# Patient Record
Sex: FEMALE | Race: WHITE | Employment: OTHER | URBAN - METROPOLITAN AREA
[De-identification: names, ages, dates, MRNs, and addresses within clinical notes are randomized per-mention and may not be internally consistent; named-entity substitution may affect disease eponyms.]

---

## 2017-03-24 PROBLEM — R53.82 CHRONIC FATIGUE: Status: ACTIVE | Noted: 2017-03-24

## 2017-03-24 PROBLEM — L29.0 RECTAL ITCHING: Status: ACTIVE | Noted: 2017-03-24

## 2017-05-30 PROBLEM — R53.82 CHRONIC FATIGUE: Status: RESOLVED | Noted: 2017-03-24 | Resolved: 2017-05-30

## 2017-09-22 PROBLEM — K21.9 GASTROESOPHAGEAL REFLUX DISEASE WITHOUT ESOPHAGITIS: Status: ACTIVE | Noted: 2017-09-22

## 2018-04-09 ENCOUNTER — HOSPITAL ENCOUNTER (OUTPATIENT)
Dept: MAMMOGRAPHY | Age: 78
Discharge: HOME | End: 2018-04-09
Attending: OBSTETRICS & GYNECOLOGY
Payer: MEDICARE

## 2018-04-09 DIAGNOSIS — Z12.39 SCREENING BREAST EXAMINATION: ICD-10-CM

## 2018-04-09 PROCEDURE — 77067 SCR MAMMO BI INCL CAD: CPT

## 2018-06-25 PROBLEM — Z28.21 REFUSED PNEUMOCOCCAL VACCINE: Status: ACTIVE | Noted: 2018-06-25

## 2018-06-25 PROBLEM — F51.02 INSOMNIA DUE TO STRESS: Status: ACTIVE | Noted: 2018-06-25

## 2018-06-25 PROBLEM — Z28.21 REFUSED INFLUENZA VACCINE: Status: ACTIVE | Noted: 2018-06-25

## 2018-06-25 PROBLEM — Z28.21 REFUSED VARICELLA VACCINE: Status: ACTIVE | Noted: 2018-06-25

## 2019-04-02 PROBLEM — Z63.79 STRESS DUE TO SPOUSE WITH DEMENTIA: Status: ACTIVE | Noted: 2019-04-02

## 2019-06-12 ENCOUNTER — HOSPITAL ENCOUNTER (OUTPATIENT)
Dept: MAMMOGRAPHY | Age: 79
Discharge: HOME | End: 2019-06-12
Attending: OBSTETRICS & GYNECOLOGY
Payer: MEDICARE

## 2019-06-12 DIAGNOSIS — Z12.39 ENCOUNTER FOR SCREENING FOR MALIGNANT NEOPLASM OF BREAST: ICD-10-CM

## 2019-06-12 PROCEDURE — 77063 BREAST TOMOSYNTHESIS BI: CPT

## 2019-07-24 PROBLEM — L29.0 RECTAL ITCHING: Status: RESOLVED | Noted: 2017-03-24 | Resolved: 2019-07-24

## 2019-12-31 ENCOUNTER — HOSPITAL ENCOUNTER (OUTPATIENT)
Dept: GENERAL RADIOLOGY | Age: 79
Discharge: HOME | End: 2019-12-31
Attending: PHYSICIAN ASSISTANT
Payer: MEDICARE

## 2019-12-31 DIAGNOSIS — M25.562 ACUTE PAIN OF LEFT KNEE: ICD-10-CM

## 2019-12-31 DIAGNOSIS — M54.50 LUMBAR BACK PAIN: ICD-10-CM

## 2019-12-31 DIAGNOSIS — M25.552 LEFT HIP PAIN: ICD-10-CM

## 2019-12-31 DIAGNOSIS — M51.36 LUMBAR DEGENERATIVE DISC DISEASE: ICD-10-CM

## 2019-12-31 PROCEDURE — 73562 X-RAY EXAM OF KNEE 3: CPT

## 2019-12-31 PROCEDURE — 73502 X-RAY EXAM HIP UNI 2-3 VIEWS: CPT

## 2019-12-31 PROCEDURE — 72100 X-RAY EXAM L-S SPINE 2/3 VWS: CPT

## 2020-07-22 ENCOUNTER — HOSPITAL ENCOUNTER (OUTPATIENT)
Dept: MAMMOGRAPHY | Age: 80
Discharge: HOME | End: 2020-07-22
Attending: OBSTETRICS & GYNECOLOGY
Payer: MEDICARE

## 2020-07-22 DIAGNOSIS — Z12.31 VISIT FOR SCREENING MAMMOGRAM: ICD-10-CM

## 2020-07-22 PROCEDURE — 77063 BREAST TOMOSYNTHESIS BI: CPT

## 2020-10-26 ENCOUNTER — HOSPITAL ENCOUNTER (INPATIENT)
Age: 80
LOS: 4 days | Discharge: HOME | DRG: 194 | End: 2020-10-30
Attending: STUDENT IN AN ORGANIZED HEALTH CARE EDUCATION/TRAINING PROGRAM | Admitting: INTERNAL MEDICINE
Payer: MEDICARE

## 2020-10-26 ENCOUNTER — APPOINTMENT (OUTPATIENT)
Dept: GENERAL RADIOLOGY | Age: 80
DRG: 194 | End: 2020-10-26
Payer: MEDICARE

## 2020-10-26 ENCOUNTER — APPOINTMENT (OUTPATIENT)
Dept: CT IMAGING | Age: 80
DRG: 194 | End: 2020-10-26
Attending: STUDENT IN AN ORGANIZED HEALTH CARE EDUCATION/TRAINING PROGRAM
Payer: MEDICARE

## 2020-10-26 DIAGNOSIS — R09.02 HYPOXEMIA: ICD-10-CM

## 2020-10-26 DIAGNOSIS — R06.02 SOB (SHORTNESS OF BREATH): Primary | ICD-10-CM

## 2020-10-26 DIAGNOSIS — J90 PLEURAL EFFUSION: ICD-10-CM

## 2020-10-26 LAB
ALBUMIN SERPL-MCNC: 3.5 G/DL (ref 3.2–4.6)
ALBUMIN/GLOB SERPL: 1.1 {RATIO} (ref 1.2–3.5)
ALP SERPL-CCNC: 82 U/L (ref 50–130)
ALT SERPL-CCNC: 21 U/L (ref 12–65)
ANION GAP SERPL CALC-SCNC: 7 MMOL/L (ref 7–16)
AST SERPL-CCNC: 19 U/L (ref 15–37)
ATRIAL RATE: 116 BPM
BASOPHILS # BLD: 0 K/UL (ref 0–0.2)
BASOPHILS NFR BLD: 0 % (ref 0–2)
BILIRUB SERPL-MCNC: 0.6 MG/DL (ref 0.2–1.1)
BNP SERPL-MCNC: 293 PG/ML
BUN SERPL-MCNC: 8 MG/DL (ref 8–23)
CALCIUM SERPL-MCNC: 9.2 MG/DL (ref 8.3–10.4)
CALCULATED P AXIS, ECG09: 63 DEGREES
CALCULATED R AXIS, ECG10: 31 DEGREES
CALCULATED T AXIS, ECG11: 106 DEGREES
CHLORIDE SERPL-SCNC: 105 MMOL/L (ref 98–107)
CO2 SERPL-SCNC: 27 MMOL/L (ref 21–32)
CREAT SERPL-MCNC: 0.83 MG/DL (ref 0.6–1)
DIAGNOSIS, 93000: NORMAL
DIFFERENTIAL METHOD BLD: ABNORMAL
EOSINOPHIL # BLD: 0.2 K/UL (ref 0–0.8)
EOSINOPHIL NFR BLD: 2 % (ref 0.5–7.8)
ERYTHROCYTE [DISTWIDTH] IN BLOOD BY AUTOMATED COUNT: 13.5 % (ref 11.9–14.6)
GLOBULIN SER CALC-MCNC: 3.1 G/DL (ref 2.3–3.5)
GLUCOSE SERPL-MCNC: 113 MG/DL (ref 65–100)
HCT VFR BLD AUTO: 38.4 % (ref 35.8–46.3)
HGB BLD-MCNC: 12.8 G/DL (ref 11.7–15.4)
IMM GRANULOCYTES # BLD AUTO: 0.1 K/UL (ref 0–0.5)
IMM GRANULOCYTES NFR BLD AUTO: 1 % (ref 0–5)
LACTATE SERPL-SCNC: 1 MMOL/L (ref 0.4–2)
LYMPHOCYTES # BLD: 1.2 K/UL (ref 0.5–4.6)
LYMPHOCYTES NFR BLD: 11 % (ref 13–44)
MCH RBC QN AUTO: 32.3 PG (ref 26.1–32.9)
MCHC RBC AUTO-ENTMCNC: 33.3 G/DL (ref 31.4–35)
MCV RBC AUTO: 97 FL (ref 79.6–97.8)
MONOCYTES # BLD: 0.8 K/UL (ref 0.1–1.3)
MONOCYTES NFR BLD: 8 % (ref 4–12)
NEUTS SEG # BLD: 8.1 K/UL (ref 1.7–8.2)
NEUTS SEG NFR BLD: 78 % (ref 43–78)
NRBC # BLD: 0 K/UL (ref 0–0.2)
P-R INTERVAL, ECG05: 96 MS
PLATELET # BLD AUTO: 217 K/UL (ref 150–450)
PMV BLD AUTO: 10.1 FL (ref 9.4–12.3)
POTASSIUM SERPL-SCNC: 3.5 MMOL/L (ref 3.5–5.1)
PROT SERPL-MCNC: 6.6 G/DL (ref 6.3–8.2)
Q-T INTERVAL, ECG07: 334 MS
QRS DURATION, ECG06: 90 MS
QTC CALCULATION (BEZET), ECG08: 464 MS
RBC # BLD AUTO: 3.96 M/UL (ref 4.05–5.2)
SODIUM SERPL-SCNC: 139 MMOL/L (ref 136–145)
TROPONIN-HIGH SENSITIVITY: 9.9 PG/ML (ref 0–14)
VENTRICULAR RATE, ECG03: 116 BPM
WBC # BLD AUTO: 10.3 K/UL (ref 4.3–11.1)

## 2020-10-26 PROCEDURE — 87635 SARS-COV-2 COVID-19 AMP PRB: CPT

## 2020-10-26 PROCEDURE — 83605 ASSAY OF LACTIC ACID: CPT

## 2020-10-26 PROCEDURE — 74011250636 HC RX REV CODE- 250/636: Performed by: STUDENT IN AN ORGANIZED HEALTH CARE EDUCATION/TRAINING PROGRAM

## 2020-10-26 PROCEDURE — 84484 ASSAY OF TROPONIN QUANT: CPT

## 2020-10-26 PROCEDURE — 74011000258 HC RX REV CODE- 258: Performed by: STUDENT IN AN ORGANIZED HEALTH CARE EDUCATION/TRAINING PROGRAM

## 2020-10-26 PROCEDURE — 85025 COMPLETE CBC W/AUTO DIFF WBC: CPT

## 2020-10-26 PROCEDURE — 74011000250 HC RX REV CODE- 250: Performed by: INTERNAL MEDICINE

## 2020-10-26 PROCEDURE — 74011250637 HC RX REV CODE- 250/637: Performed by: INTERNAL MEDICINE

## 2020-10-26 PROCEDURE — 65270000029 HC RM PRIVATE

## 2020-10-26 PROCEDURE — 2709999900 HC NON-CHARGEABLE SUPPLY

## 2020-10-26 PROCEDURE — 99283 EMERGENCY DEPT VISIT LOW MDM: CPT

## 2020-10-26 PROCEDURE — 71046 X-RAY EXAM CHEST 2 VIEWS: CPT

## 2020-10-26 PROCEDURE — 74011250636 HC RX REV CODE- 250/636: Performed by: INTERNAL MEDICINE

## 2020-10-26 PROCEDURE — 83880 ASSAY OF NATRIURETIC PEPTIDE: CPT

## 2020-10-26 PROCEDURE — 71260 CT THORAX DX C+: CPT

## 2020-10-26 PROCEDURE — 93005 ELECTROCARDIOGRAM TRACING: CPT | Performed by: STUDENT IN AN ORGANIZED HEALTH CARE EDUCATION/TRAINING PROGRAM

## 2020-10-26 PROCEDURE — 74011000636 HC RX REV CODE- 636: Performed by: STUDENT IN AN ORGANIZED HEALTH CARE EDUCATION/TRAINING PROGRAM

## 2020-10-26 PROCEDURE — 80053 COMPREHEN METABOLIC PANEL: CPT

## 2020-10-26 RX ORDER — ENOXAPARIN SODIUM 100 MG/ML
40 INJECTION SUBCUTANEOUS DAILY
Status: DISCONTINUED | OUTPATIENT
Start: 2020-10-27 | End: 2020-10-26

## 2020-10-26 RX ORDER — PANTOPRAZOLE SODIUM 40 MG/1
40 TABLET, DELAYED RELEASE ORAL
Status: DISCONTINUED | OUTPATIENT
Start: 2020-10-27 | End: 2020-10-30 | Stop reason: HOSPADM

## 2020-10-26 RX ORDER — HYDROCORTISONE ACETATE PRAMOXINE HCL 2.5; 1 G/100G; G/100G
CREAM TOPICAL
Status: DISCONTINUED | OUTPATIENT
Start: 2020-10-26 | End: 2020-10-30 | Stop reason: HOSPADM

## 2020-10-26 RX ORDER — AZITHROMYCIN 250 MG/1
500 TABLET, FILM COATED ORAL DAILY
Status: DISCONTINUED | OUTPATIENT
Start: 2020-10-27 | End: 2020-10-26

## 2020-10-26 RX ORDER — SODIUM CHLORIDE 9 MG/ML
50 INJECTION, SOLUTION INTRAVENOUS CONTINUOUS
Status: DISPENSED | OUTPATIENT
Start: 2020-10-26 | End: 2020-10-27

## 2020-10-26 RX ORDER — PROMETHAZINE HYDROCHLORIDE 25 MG/1
12.5 TABLET ORAL
Status: DISCONTINUED | OUTPATIENT
Start: 2020-10-26 | End: 2020-10-30 | Stop reason: HOSPADM

## 2020-10-26 RX ORDER — HYOSCYAMINE SULFATE 0.12 MG/1
0.12 TABLET SUBLINGUAL
Status: DISCONTINUED | OUTPATIENT
Start: 2020-10-26 | End: 2020-10-30 | Stop reason: HOSPADM

## 2020-10-26 RX ORDER — ONDANSETRON 2 MG/ML
4 INJECTION INTRAMUSCULAR; INTRAVENOUS
Status: DISCONTINUED | OUTPATIENT
Start: 2020-10-26 | End: 2020-10-26

## 2020-10-26 RX ORDER — SODIUM CHLORIDE 0.9 % (FLUSH) 0.9 %
5-40 SYRINGE (ML) INJECTION AS NEEDED
Status: DISCONTINUED | OUTPATIENT
Start: 2020-10-26 | End: 2020-10-30 | Stop reason: HOSPADM

## 2020-10-26 RX ORDER — IPRATROPIUM BROMIDE AND ALBUTEROL SULFATE 2.5; .5 MG/3ML; MG/3ML
3 SOLUTION RESPIRATORY (INHALATION)
Status: DISCONTINUED | OUTPATIENT
Start: 2020-10-26 | End: 2020-10-30 | Stop reason: HOSPADM

## 2020-10-26 RX ORDER — ACETAMINOPHEN 325 MG/1
650 TABLET ORAL
Status: DISCONTINUED | OUTPATIENT
Start: 2020-10-26 | End: 2020-10-30 | Stop reason: HOSPADM

## 2020-10-26 RX ORDER — LATANOPROST 50 UG/ML
1 SOLUTION/ DROPS OPHTHALMIC
Status: DISCONTINUED | OUTPATIENT
Start: 2020-10-26 | End: 2020-10-30 | Stop reason: HOSPADM

## 2020-10-26 RX ORDER — BISMUTH SUBSALICYLATE 262 MG/15ML
30 LIQUID ORAL
Status: DISCONTINUED | OUTPATIENT
Start: 2020-10-26 | End: 2020-10-30 | Stop reason: HOSPADM

## 2020-10-26 RX ORDER — IPRATROPIUM BROMIDE 0.5 MG/2.5ML
0.5 SOLUTION RESPIRATORY (INHALATION) ONCE
Status: DISCONTINUED | OUTPATIENT
Start: 2020-10-26 | End: 2020-10-26

## 2020-10-26 RX ORDER — SODIUM CHLORIDE 0.9 % (FLUSH) 0.9 %
5-40 SYRINGE (ML) INJECTION EVERY 8 HOURS
Status: DISCONTINUED | OUTPATIENT
Start: 2020-10-26 | End: 2020-10-30 | Stop reason: HOSPADM

## 2020-10-26 RX ORDER — ACETAMINOPHEN 650 MG/1
650 SUPPOSITORY RECTAL
Status: DISCONTINUED | OUTPATIENT
Start: 2020-10-26 | End: 2020-10-30 | Stop reason: HOSPADM

## 2020-10-26 RX ORDER — POLYETHYLENE GLYCOL 3350 17 G/17G
17 POWDER, FOR SOLUTION ORAL DAILY PRN
Status: DISCONTINUED | OUTPATIENT
Start: 2020-10-26 | End: 2020-10-30 | Stop reason: HOSPADM

## 2020-10-26 RX ORDER — SODIUM CHLORIDE 0.9 % (FLUSH) 0.9 %
10 SYRINGE (ML) INJECTION
Status: COMPLETED | OUTPATIENT
Start: 2020-10-26 | End: 2020-10-26

## 2020-10-26 RX ORDER — LORATADINE 10 MG/1
10 TABLET ORAL DAILY
Status: DISCONTINUED | OUTPATIENT
Start: 2020-10-27 | End: 2020-10-30 | Stop reason: HOSPADM

## 2020-10-26 RX ORDER — GUAIFENESIN 600 MG/1
600 TABLET, EXTENDED RELEASE ORAL EVERY 12 HOURS
Status: DISCONTINUED | OUTPATIENT
Start: 2020-10-26 | End: 2020-10-30 | Stop reason: HOSPADM

## 2020-10-26 RX ORDER — ALBUTEROL SULFATE 2.5 MG/.5ML
1.25 SOLUTION RESPIRATORY (INHALATION)
Status: DISCONTINUED | OUTPATIENT
Start: 2020-10-26 | End: 2020-10-26

## 2020-10-26 RX ORDER — LEVOFLOXACIN 5 MG/ML
500 INJECTION, SOLUTION INTRAVENOUS EVERY 24 HOURS
Status: DISCONTINUED | OUTPATIENT
Start: 2020-10-26 | End: 2020-10-28 | Stop reason: ALTCHOICE

## 2020-10-26 RX ORDER — TIMOLOL MALEATE 5 MG/ML
1 SOLUTION/ DROPS OPHTHALMIC DAILY
Status: DISCONTINUED | OUTPATIENT
Start: 2020-10-27 | End: 2020-10-30 | Stop reason: HOSPADM

## 2020-10-26 RX ADMIN — HYDROCORTISONE ACETATE PRAMOXINE HCL: 2.5; 1 CREAM TOPICAL at 23:45

## 2020-10-26 RX ADMIN — SODIUM CHLORIDE 50 ML/HR: 900 INJECTION, SOLUTION INTRAVENOUS at 18:00

## 2020-10-26 RX ADMIN — HYOSCYAMINE SULFATE 0.12 MG: 0.12 TABLET ORAL; SUBLINGUAL at 23:45

## 2020-10-26 RX ADMIN — Medication 10 ML: at 13:32

## 2020-10-26 RX ADMIN — LATANOPROST 1 DROP: 50 SOLUTION OPHTHALMIC at 22:20

## 2020-10-26 RX ADMIN — GUAIFENESIN 600 MG: 600 TABLET, EXTENDED RELEASE ORAL at 22:20

## 2020-10-26 RX ADMIN — SODIUM CHLORIDE 500 ML: 900 INJECTION, SOLUTION INTRAVENOUS at 13:00

## 2020-10-26 RX ADMIN — IOPAMIDOL 100 ML: 755 INJECTION, SOLUTION INTRAVENOUS at 13:31

## 2020-10-26 RX ADMIN — SODIUM CHLORIDE 100 ML: 900 INJECTION, SOLUTION INTRAVENOUS at 13:32

## 2020-10-26 RX ADMIN — LEVOFLOXACIN 500 MG: 5 INJECTION, SOLUTION INTRAVENOUS at 17:11

## 2020-10-26 RX ADMIN — Medication 10 ML: at 22:20

## 2020-10-26 NOTE — H&P
History and Physical    Patient: Sandy Valdes MRN: [id number]  SSN: [id number]    YOB: 1940  Age: 79 y.o. Sex: female      Subjective:      Sandy Valdes is a 79 y.o. female who came to ER due to coughing, shortness of breath for the past 3 days. She has had some cough and shortness of breath for the past week. She thought it was because she was going through her stuff in garage. She started taking Zithromax yesterday. Her shortness of breath and coughing got worse and so she decided to come to ER. In ER, she was found to have oxygen saturation of 87% in room air. She was improved with oxygen cannula. CT shows pleural effusion on the right chest with possible mass effect. Hospitalist service is requested to admit the patient. Other PMH is listed below.      Past Medical History:   Diagnosis Date    Arrhythmia     while in ER a week ago-irregular/ Regular rythm today    Arthritis     osteo-left hand    Autoimmune disease ([address]     fibromyalgia    Compression fracture of T12 vertebra ([address] 8/1/2014    Endometriosis     hyst    Fibrocystic breast     Fibromyalgia     GERD (gastroesophageal reflux disease)     nexium daily    Glaucoma     Followed by Dr. Ronda May Headache     rare (not migraines)    IBS (irritable bowel syndrome)     bentyl    Ovarian cyst     hx of-bilateral S&O    Rectal itching 3/24/2017    UTI (urinary tract infection)     hx of; last one 2-3 years ago    Vaginitis, atrophic     asymptomatic at present     Past Surgical History:   Procedure Laterality Date    HX BREAST BIOPSY Bilateral 1980    HX HEMORRHOIDECTOMY  1970    HX HYSTERECTOMY Bilateral 1986    full    HX KYPHOPLASTY N/A 08/2014    HX OVARIAN CYST REMOVAL  1984    Both ovaries have been removed    HX [facility]    HX VAGINAL VAULT SUSPENSION  2009      Family History   Problem Relation Age of Onset    Arthritis-osteo Mother     GERD Mother     Cancer Mother         stomach cancer    Elevated Lipids Father     Heart Disease Father 76    Hypertension Father     Heart Attack Father     GERD Father     GERD Brother     GERD Son     GERD Brother     GERD Brother     Thyroid Disease Daughter     Breast Cancer Neg Hx      Social History     Tobacco Use    Smoking status: Former Smoker     Last attempt to quit: 1986     Years since quittin.2    Smokeless tobacco: Never Used   Substance Use Topics    Alcohol use: Yes     Alcohol/week: 5.8 standard drinks     Types: 7 Glasses of wine per week      Prior to Admission medications    Medication Sig Start Date End Date Taking? Authorizing Provider   hyoscyamine SL (LEVSIN/SL) 0.125 mg SL tablet 1 Tab by SubLINGual route every four (4) hours as needed for Cramping. Indications: irritable colon 20   Isa Heck MD   hydrocortisone-pramoxine [facility]) 2.5-1 % rectal cream Insert  into rectum three (3) times daily. 20   Isa Heck MD   bi-est/progest 1.5/100 (BI-EST/PROGEST 1.5/100) compound Apply  to affected area. COMPOUND= Bi- est/progest 1.5/ 100 Cream    Provider, Historical   timolol (TIMOPTIC) 0.5 % ophthalmic solution PLACE 1 DROP IN BOTH EYES IN THE MORNING 1/10/17   Provider, Historical   triamcinolone (NASACORT AQ) 55 mcg nasal inhaler 2 Sprays daily. Provider, Historical   estradiol (ESTRACE) 0.01 % (0.1 mg/gram) vaginal cream Use 2 grams nightly daily 7/1/15   Patricio Gandhi MD   cetirizine (ZYRTEC) 10 mg tablet Take  by mouth daily. Provider, Historical   esomeprazole (NEXIUM) 20 mg capsule Take 40 mg by mouth every morning. Provider, Historical   latanoprost (XALATAN) 0.005 % ophthalmic solution Administer 1 Drop to both eyes nightly.     Provider, Historical        Allergies   Allergen Reactions    Seasonale [Levonorgestrel-Ethinyl Estrad] Runny Nose and Sneezing     Patient denies ever taking medication    Ciprofibrate Diarrhea    Penicillins Rash Review of Systems:    10-point review of systems is negative except what is mentioned in the present illness section. Objective:     Vitals:    10/26/20 1151 10/26/20 1259   BP: (!) 144/74    Pulse: (!) 136 (!) 119   Resp: 24    Temp: 98 °F (36.7 °C)    SpO2: (!) 87% 94%   Weight: 65.8 kg (145 lb)    Height: 5' 4\" (1.626 m)         Physical Exam:    General:                    The patient is a pleasant elderly female in acute respiratory distress. She is coughing. Head:                                   Normocephalic/atraumatic. Eyes:                                   No palpebral pallor or scleral icterus. ENT:                                    External auricular and nasal exam within normal limits. Mucous membranes are dry. Neck:                                   Supple, non-tender, no JVD. Lungs:                       decreased to auscultation bilaterally, more the right. Some crackles                                               No respiratory accessory muscle use. Heart:                                  Regular rate and rhythm, without murmurs, rubs, or gallops. Abdomen:                  Soft, non-tender, non-distended with normoactive bowel sounds. Genitourinary:           No tenderness over the bladder or bilateral CVAs. Extremities:               Without clubbing, cyanosis, or edema. Skin:                                    Normal color, texture, and turgor. No rashes, lesions, or jaundice. Pulses:                      Radial and dorsalis pedis pulses present 2+ bilaterally. Capillary refill <2s. Neurologic:                CN II-XII grossly intact and symmetrical.                                               Moving all four extremities well with no focal deficits. Psychiatric:                Pleasant demeanor, appropriate affect.  Alert and oriented x 3    Lab and data     Recent Results (from the past 24 hour(s))   CBC WITH AUTOMATED DIFF    Collection Time: 10/26/20 12:08 PM   Result Value Ref Range    WBC 10.3 4.3 - 11.1 K/uL    RBC 3.96 (L) 4.05 - 5.2 M/uL    HGB 12.8 11.7 - 15.4 g/dL    HCT 38.4 35.8 - 46.3 %    MCV 97.0 79.6 - 97.8 FL    MCH 32.3 26.1 - 32.9 PG    MCHC 33.3 31.4 - 35.0 g/dL    RDW 13.5 11.9 - 14.6 %    PLATELET 650 633 - 864 K/uL    MPV 10.1 9.4 - 12.3 FL    ABSOLUTE NRBC 0.00 0.0 - 0.2 K/uL    DF AUTOMATED      NEUTROPHILS 78 43 - 78 %    LYMPHOCYTES 11 (L) 13 - 44 %    MONOCYTES 8 4.0 - 12.0 %    EOSINOPHILS 2 0.5 - 7.8 %    BASOPHILS 0 0.0 - 2.0 %    IMMATURE GRANULOCYTES 1 0.0 - 5.0 %    ABS. NEUTROPHILS 8.1 1.7 - 8.2 K/UL    ABS. LYMPHOCYTES 1.2 0.5 - 4.6 K/UL    ABS. MONOCYTES 0.8 0.1 - 1.3 K/UL    ABS. EOSINOPHILS 0.2 0.0 - 0.8 K/UL    ABS. BASOPHILS 0.0 0.0 - 0.2 K/UL    ABS. IMM. GRANS. 0.1 0.0 - 0.5 K/UL   METABOLIC PANEL, COMPREHENSIVE    Collection Time: 10/26/20 12:08 PM   Result Value Ref Range    Sodium 139 136 - 145 mmol/L    Potassium 3.5 3.5 - 5.1 mmol/L    Chloride 105 98 - 107 mmol/L    CO2 27 21 - 32 mmol/L    Anion gap 7 7 - 16 mmol/L    Glucose 113 (H) 65 - 100 mg/dL    BUN 8 8 - 23 MG/DL    Creatinine 0.83 0.6 - 1.0 MG/DL    GFR est AA >60 >60 ml/min/1.73m2    GFR est non-AA >60 >60 ml/min/1.73m2    Calcium 9.2 8.3 - 10.4 MG/DL    Bilirubin, total 0.6 0.2 - 1.1 MG/DL    ALT (SGPT) 21 12 - 65 U/L    AST (SGOT) 19 15 - 37 U/L    Alk.  phosphatase 82 50 - 130 U/L    Protein, total 6.6 6.3 - 8.2 g/dL    Albumin 3.5 3.2 - 4.6 g/dL    Globulin 3.1 2.3 - 3.5 g/dL    A-G Ratio 1.1 (L) 1.2 - 3.5     TROPONIN-HIGH SENSITIVITY    Collection Time: 10/26/20 12:08 PM   Result Value Ref Range    Troponin-High Sensitivity 9.9 0 - 14 pg/mL   NT-PRO BNP    Collection Time: 10/26/20 12:08 PM   Result Value Ref Range    NT pro- <450 PG/ML   LACTIC ACID    Collection Time: 10/26/20 12:24 PM   Result Value Ref Range    Lactic acid 1.0 0.4 - 2.0 MMOL/L   EKG, 12 LEAD, INITIAL    Collection Time: 10/26/20 12:52 PM   Result Value Ref Range    Ventricular Rate 116 BPM    Atrial Rate 116 BPM    P-R Interval 96 ms    QRS Duration 90 ms    Q-T Interval 334 ms    QTC Calculation (Bezet) 464 ms    Calculated P Axis 63 degrees    Calculated R Axis 31 degrees    Calculated T Axis 106 degrees    Diagnosis       !! AGE AND GENDER SPECIFIC ECG ANALYSIS !! Sinus tachycardia with short MO with occasional Premature ventricular   complexes  Septal infarct (cited on or before 01-AUG-2014)  Nonspecific ST abnormality  When compared with ECG of 01-AUG-2014 19:55,  MO interval has decreased  QRS axis Shifted right  ST now depressed in Inferior leads  Nonspecific T wave abnormality, worse in Lateral leads  Confirmed by Matt Clarke MD (), NIC WARREN ([id number]) on 10/26/2020 2:42:04 PM       CT chest   10/26/2020   IMPRESSION:  1. Large right pleural effusion with collapse of the right lower lobe and  majority of the right middle lobe and mass effect on the mediastinum and  inferior displacement of the diaphragm. . No pericardial fluid. 2. No obstructing right hilar mass. Reimaging can performed after fluid removal  for further evaluation. 3. No pulmonary embolism. XR chest   10-   IMPRESSION:  Moderate right pleural effusion, new from exam on September 18, 2015.     EKG   10-   !! AGE AND GENDER SPECIFIC ECG ANALYSIS !! Sinus tachycardia with short MO with occasional Premature ventricular   complexes   Septal infarct (cited on or before 01-AUG-2014)   Nonspecific ST abnormality   When compared with ECG of 01-AUG-2014 19:55,   MO interval has decreased   QRS axis Shifted right   ST now depressed in Inferior leads   Nonspecific T wave abnormality, worse in Lateral leads   Confirmed by Matt Clarke MD ()NIC ([id number]) on 10/26/2020 2:42:04 PM     I have reviewed chest x-ray and ECG myself.      Assessment:     Hospital Problems  Date Reviewed: 1/21/2020          Codes Class Noted POA * (Principal) Pleural effusion ICD-10-CM: J90  ICD-9-CM: 511.9  10/26/2020 Unknown        Gastroesophageal reflux disease without esophagitis ICD-10-CM: K21.9  ICD-9-CM: 530.81  9/22/2017 Yes        Irritable bowel syndrome with diarrhea ICD-10-CM: K58.0  ICD-9-CM: 564.1  9/16/2016 Yes              Plan:     Pleural effusion   shortness of breath   Hypoxia   ? mass effect   Admit to medical floor   IV fluid   Empiric IV antibiotics to cover CAP  Nebulizer   Oxygen   Consult pulmonary service for thoracentesis and ?bronchoscopy. IBS   Continue home medications. GERD   Continue home medications. Patient requires hospital stay as an in-patient and anticipated stay is more than 2 midnights due to the serious nature of the illness. I have discussed with patient regarding advance directive. Patient would like to have a DNR status. Healthcare power of  is daughter, Naomy James. I have discussed the plan of care with patient. Patient has no pain now. Will monitor. Further treatments will depend on initial responses and findings.      DVT prophylaxis : SCD     Signed By: Zackery Gutiérrez MD     October 26, 2020

## 2020-10-26 NOTE — ED PROVIDER NOTES
Patient is a very pleasant 67-year-old female presents to the emergency department for evaluation of cough, congestion shortness of breath. Patient states her shortness of breath is far worse with exertion. Denies fever or chills. Patient states she was seen at local urgent care x2, last being seen yesterday, she did not receive a chest x-ray, given a Z-Rajendra as well as an inhaler. Patient ports no improvement of symptoms and came to the emergency department. Patient also has right-sided chest pain, worse with lying down, improves with sitting up. She denies palpitations. Denies history of atrial fibrillation, coronary artery disease, history of PE, history of cancer. Patient is on estrogen therapy.            Past Medical History:   Diagnosis Date    Arrhythmia     while in ER a week ago-irregular/ Regular rythm today    Arthritis     osteo-left hand    Autoimmune disease ([address]     fibromyalgia    Compression fracture of T12 vertebra ([address] 8/1/2014    Endometriosis     hyst    Fibrocystic breast     Fibromyalgia     GERD (gastroesophageal reflux disease)     nexium daily    Glaucoma     Followed by Dr. Coby Ruiz Headache     rare (not migraines)    IBS (irritable bowel syndrome)     bentyl    Ovarian cyst     hx of-bilateral S&O    Rectal itching 3/24/2017    UTI (urinary tract infection)     hx of; last one 2-3 years ago    Vaginitis, atrophic     asymptomatic at present       Past Surgical History:   Procedure Laterality Date    HX BREAST BIOPSY Bilateral 1980    HX HEMORRHOIDECTOMY  1970    HX HYSTERECTOMY Bilateral 1986    full    HX KYPHOPLASTY N/A 08/2014    HX OVARIAN CYST REMOVAL  1984    Both ovaries have been removed    [facility]X [facility] [facility]    HX VAGINAL VAULT SUSPENSION  2009         Family History:   Problem Relation Age of Onset   [address] [facility] [address] Arthritis-osteo Mother     GERD Mother     Cancer Mother         stomach cancer    Elevated Lipids Father     Heart Disease Father 76  Hypertension Father     Heart Attack Father     GERD Father     GERD Brother     GERD Son     GERD Brother     GERD Brother     Thyroid Disease Daughter     Breast Cancer Neg Hx        Social History     Socioeconomic History    Marital status:      Spouse name: Not on file    Number of children: Not on file    Years of education: Not on file    Highest education level: Not on file   Occupational History    Not on file   Social Needs    Financial resource strain: Not on file    Food insecurity     Worry: Not on file     Inability: Not on file    Transportation needs     Medical: Not on file     Non-medical: Not on file   Tobacco Use    Smoking status: Former Smoker     Last attempt to quit: 1986     Years since quittin.2    Smokeless tobacco: Never Used   Substance and Sexual Activity    Alcohol use: Yes     Alcohol/week: 5.8 standard drinks     Types: 7 Glasses of wine per week    Drug use: No    Sexual activity: Yes     Partners: Male     Birth control/protection: Surgical   Lifestyle    Physical activity     Days per week: Not on file     Minutes per session: Not on file    Stress: Not on file   Relationships    Social connections     Talks on phone: Not on file     Gets together: Not on file     Attends Druze service: Not on file     Active member of club or organization: Not on file     Attends meetings of clubs or organizations: Not on file     Relationship status: Not on file    Intimate partner violence     Fear of current or ex partner: Not on file     Emotionally abused: Not on file     Physically abused: Not on file     Forced sexual activity: Not on file   Other Topics Concern    Not on file   Social History Narrative    Not on file         ALLERGIES: Seasonale [levonorgestrel-ethinyl estrad]; Ciprofibrate; and Penicillins    Review of Systems   Constitutional: Negative for chills and fever. HENT: Negative for sinus pressure. Eyes: Negative. Respiratory: Positive for cough and shortness of breath. Negative for chest tightness. Cardiovascular: Negative for chest pain. Gastrointestinal: Negative for abdominal pain. Genitourinary: Negative for flank pain. Vitals:    10/26/20 1151   BP: (!) 144/74   Pulse: (!) 136   Resp: 24   Temp: 98 °F (36.7 °C)   SpO2: (!) 87%   Weight: 65.8 kg (145 lb)   Height: 5' 4\" (1.626 m)            Physical Exam  HENT:      Head: Normocephalic and atraumatic. Eyes:      Extraocular Movements: Extraocular movements intact. Neck:      Musculoskeletal: Normal range of motion. Cardiovascular:      Rate and Rhythm: Regular rhythm. Tachycardia present. Heart sounds: No friction rub. Pulmonary:      Effort: No respiratory distress. Breath sounds: Examination of the right-middle field reveals decreased breath sounds. Examination of the right-lower field reveals decreased breath sounds. Decreased breath sounds present. Chest:      Chest wall: No crepitus or edema. Abdominal:      Palpations: Abdomen is soft. Skin:     General: Skin is warm. Neurological:      General: No focal deficit present. Mental Status: She is alert. Psychiatric:         Mood and Affect: Mood normal.          MDM  Number of Diagnoses or Management Options  Pleural effusion:   SOB (shortness of breath):   Diagnosis management comments: Patient seen immediately upon arrival by myself, patient found to be tachycardic, normal O2 saturation on room air while at rest.  Patient does desaturate, to the high 80s with exertion. Patient somewhat conversationally dyspneic. No swelling bilateral lower extremities. Given patient's history of cough congestion, initially a DuoNeb was ordered, this was canceled secondary to patient's chest x-ray showing large right-sided pleural effusion. With no history of heart failure, cancer, ascites. Patient's labs show no emergent findings.   EKG shows tachycardia, rate 116, no evidence of left heart commands or obvious ischemia. Troponin and BNP within normal limits. CT scan with IV contrast shows right large pleural effusion, no evidence of pericardial effusion. Given patient's findings and hypoxia, she warrants medical admission. I spoke with hospitalist who agreed to this patient continued evaluation and treatment. Patient voiced understanding and agreement this plan.          Procedures

## 2020-10-26 NOTE — ED TRIAGE NOTES
Patient co cough and sob for few days. Patient ambulated from lobby to triage and O2 dropped to 87%RA. Patient at rest goes back up to 94%RA.   Patient was seen at urgent care yesterday but unable to get chest xray pt rx zpack

## 2020-10-26 NOTE — PROGRESS NOTES
Problem: Falls - Risk of  Goal: *Absence of Falls  Description: Document Gwendolyn Mercado Fall Risk and appropriate interventions in the flowsheet.   Outcome: Progressing Towards Goal  Note: Fall Risk Interventions:            Medication Interventions: Patient to call before getting OOB    Elimination Interventions: Call light in reach

## 2020-10-26 NOTE — ED NOTES
TRANSFER - OUT REPORT:    Verbal report given to 7 Josephine Ordonez on Georgie Hews  being transferred to room 365 for routine progression of care       Report consisted of patients Situation, Background, Assessment and   Recommendations(SBAR). Information from the following report(s) SBAR was reviewed with the receiving nurse. Lines:   Peripheral IV 10/26/20 Right Antecubital (Active)   Site Assessment Clean, dry, & intact 10/26/20 1711   Phlebitis Assessment 0 10/26/20 1711   Infiltration Assessment 0 10/26/20 1711   Dressing Status Clean, dry, & intact 10/26/20 1711   Dressing Type Transparent 10/26/20 1711        Opportunity for questions and clarification was provided.       Patient transported with:   [employer]

## 2020-10-26 NOTE — PROGRESS NOTES
END OF SHIFT NOTE:    Intake/Output  No intake/output data recorded. Voiding: YES  Catheter: NO  Drain:              Stool:  0 occurrences. Emesis:  0 occurrences. VITAL SIGNS  Patient Vitals for the past 12 hrs:   Temp Pulse Resp BP SpO2   10/26/20 1800 98.2 °F (36.8 °C) (!) 122 22 113/71 94 %   10/26/20 1717  (!) 121   94 %   10/26/20 1715  (!) 122  98/62    10/26/20 1637     96 %   10/26/20 1528  (!) 121   94 %   10/26/20 1506  (!) 122   94 %   10/26/20 1423  (!) 122   95 %   10/26/20 1300     94 %   10/26/20 1259  (!) 119   94 %   10/26/20 1243     95 %   10/26/20 1151 98 °F (36.7 °C) (!) 136 24 (!) 144/74 (!) 87 %       Pain Assessment  Pain 1  Pain Scale 1: Numeric (0 - 10) (10/26/20 1800)  Pain Intensity 1: 0 (10/26/20 1800)  Patient Stated Pain Goal: 0 (10/26/20 1800)  Pain Location 1: Chest (10/26/20 1151)    Ambulating  Yes    Additional Information:       Shift report given to oncoming nurse Nerissa RN at the bedside.     Mallie Dakins

## 2020-10-26 NOTE — PROGRESS NOTES
10/26/20 1839   Dual Skin Pressure Injury Assessment   Dual Skin Pressure Injury Assessment WDL   Second Care Provider (Based on Facility Policy) DAYANA Garrido   Skin Integumentary   Skin Integumentary (WDL) WDL    Pressure  Injury Documentation No Pressure Injury Noted-Pressure Ulcer Prevention Initiated   no bumps, bruises, lesions, scrapes, etc.

## 2020-10-27 ENCOUNTER — APPOINTMENT (OUTPATIENT)
Dept: CT IMAGING | Age: 80
DRG: 194 | End: 2020-10-27
Attending: INTERNAL MEDICINE
Payer: MEDICARE

## 2020-10-27 PROBLEM — R09.02 HYPOXEMIA: Status: ACTIVE | Noted: 2020-10-27

## 2020-10-27 LAB
ANION GAP SERPL CALC-SCNC: 7 MMOL/L (ref 7–16)
APPEARANCE FLD: NORMAL
BASOPHILS # BLD: 0 K/UL (ref 0–0.2)
BASOPHILS NFR BLD: 0 % (ref 0–2)
BUN SERPL-MCNC: 9 MG/DL (ref 8–23)
CALCIUM SERPL-MCNC: 8.1 MG/DL (ref 8.3–10.4)
CHLORIDE SERPL-SCNC: 106 MMOL/L (ref 98–107)
CO2 SERPL-SCNC: 24 MMOL/L (ref 21–32)
COLOR FLD: NORMAL
CREAT SERPL-MCNC: 0.6 MG/DL (ref 0.6–1)
DIFFERENTIAL METHOD BLD: ABNORMAL
EOSINOPHIL # BLD: 0.1 K/UL (ref 0–0.8)
EOSINOPHIL NFR BLD: 1 % (ref 0.5–7.8)
EOSINOPHIL NFR BRONCH MANUAL: 5 %
ERYTHROCYTE [DISTWIDTH] IN BLOOD BY AUTOMATED COUNT: 13.4 % (ref 11.9–14.6)
GLUCOSE SERPL-MCNC: 123 MG/DL (ref 65–100)
HCT VFR BLD AUTO: 31 % (ref 35.8–46.3)
HGB BLD-MCNC: 10.3 G/DL (ref 11.7–15.4)
IMM GRANULOCYTES # BLD AUTO: 0.1 K/UL (ref 0–0.5)
IMM GRANULOCYTES NFR BLD AUTO: 1 % (ref 0–5)
LDH FLD L TO P-CCNC: 512 U/L
LDH SERPL L TO P-CCNC: 196 U/L (ref 110–210)
LYMPHOCYTES # BLD: 1.1 K/UL (ref 0.5–4.6)
LYMPHOCYTES NFR BLD: 11 % (ref 13–44)
LYMPHOCYTES NFR BRONCH MANUAL: 16 %
MACROPHAGES NFR BRONCH MANUAL: 1 %
MCH RBC QN AUTO: 32.6 PG (ref 26.1–32.9)
MCHC RBC AUTO-ENTMCNC: 33.2 G/DL (ref 31.4–35)
MCV RBC AUTO: 98.1 FL (ref 79.6–97.8)
MONOCYTES # BLD: 0.8 K/UL (ref 0.1–1.3)
MONOCYTES NFR BLD: 9 % (ref 4–12)
NEUTROPHILS NFR BRONCH MANUAL: 78 %
NEUTS SEG # BLD: 7.6 K/UL (ref 1.7–8.2)
NEUTS SEG NFR BLD: 79 % (ref 43–78)
NRBC # BLD: 0 K/UL (ref 0–0.2)
NUC CELL # FLD: 2575 /CU MM
PLATELET # BLD AUTO: 186 K/UL (ref 150–450)
PMV BLD AUTO: 10.4 FL (ref 9.4–12.3)
POTASSIUM SERPL-SCNC: 4.2 MMOL/L (ref 3.5–5.1)
PROT FLD-MCNC: 4.5 G/DL
RBC # BLD AUTO: 3.16 M/UL (ref 4.05–5.2)
RBC # FLD: NORMAL /CU MM
SODIUM SERPL-SCNC: 137 MMOL/L (ref 136–145)
SPECIMEN SOURCE FLD: NORMAL
WBC # BLD AUTO: 9.6 K/UL (ref 4.3–11.1)

## 2020-10-27 PROCEDURE — 83615 LACTATE (LD) (LDH) ENZYME: CPT

## 2020-10-27 PROCEDURE — 32555 ASPIRATE PLEURA W/ IMAGING: CPT | Performed by: INTERNAL MEDICINE

## 2020-10-27 PROCEDURE — 84157 ASSAY OF PROTEIN OTHER: CPT

## 2020-10-27 PROCEDURE — 88112 CYTOPATH CELL ENHANCE TECH: CPT

## 2020-10-27 PROCEDURE — 87077 CULTURE AEROBIC IDENTIFY: CPT

## 2020-10-27 PROCEDURE — 74011000258 HC RX REV CODE- 258: Performed by: INTERNAL MEDICINE

## 2020-10-27 PROCEDURE — 0W993ZZ DRAINAGE OF RIGHT PLEURAL CAVITY, PERCUTANEOUS APPROACH: ICD-10-PCS | Performed by: INTERNAL MEDICINE

## 2020-10-27 PROCEDURE — 99223 1ST HOSP IP/OBS HIGH 75: CPT | Performed by: INTERNAL MEDICINE

## 2020-10-27 PROCEDURE — 74011000250 HC RX REV CODE- 250: Performed by: INTERNAL MEDICINE

## 2020-10-27 PROCEDURE — 74011000636 HC RX REV CODE- 636: Performed by: INTERNAL MEDICINE

## 2020-10-27 PROCEDURE — 80048 BASIC METABOLIC PNL TOTAL CA: CPT

## 2020-10-27 PROCEDURE — 89050 BODY FLUID CELL COUNT: CPT

## 2020-10-27 PROCEDURE — 36415 COLL VENOUS BLD VENIPUNCTURE: CPT

## 2020-10-27 PROCEDURE — 88341 IMHCHEM/IMCYTCHM EA ADD ANTB: CPT

## 2020-10-27 PROCEDURE — 77030014146 HC TY THORCENT/PARACENT BD -B

## 2020-10-27 PROCEDURE — 74011250637 HC RX REV CODE- 250/637: Performed by: INTERNAL MEDICINE

## 2020-10-27 PROCEDURE — 70491 CT SOFT TISSUE NECK W/DYE: CPT

## 2020-10-27 PROCEDURE — 85025 COMPLETE CBC W/AUTO DIFF WBC: CPT

## 2020-10-27 PROCEDURE — 88342 IMHCHEM/IMCYTCHM 1ST ANTB: CPT

## 2020-10-27 PROCEDURE — 65270000029 HC RM PRIVATE

## 2020-10-27 PROCEDURE — 87205 SMEAR GRAM STAIN: CPT

## 2020-10-27 PROCEDURE — 75810000165 HC THORACENTESIS

## 2020-10-27 PROCEDURE — 88305 TISSUE EXAM BY PATHOLOGIST: CPT

## 2020-10-27 PROCEDURE — 87186 SC STD MICRODIL/AGAR DIL: CPT

## 2020-10-27 PROCEDURE — 74011250636 HC RX REV CODE- 250/636: Performed by: INTERNAL MEDICINE

## 2020-10-27 RX ORDER — SODIUM CHLORIDE 0.9 % (FLUSH) 0.9 %
10 SYRINGE (ML) INJECTION
Status: COMPLETED | OUTPATIENT
Start: 2020-10-27 | End: 2020-10-27

## 2020-10-27 RX ADMIN — LORATADINE 10 MG: 10 TABLET ORAL at 09:37

## 2020-10-27 RX ADMIN — ACETAMINOPHEN 650 MG: 325 TABLET, FILM COATED ORAL at 21:27

## 2020-10-27 RX ADMIN — Medication 10 ML: at 14:03

## 2020-10-27 RX ADMIN — LEVOFLOXACIN 500 MG: 5 INJECTION, SOLUTION INTRAVENOUS at 16:10

## 2020-10-27 RX ADMIN — DIATRIZOATE MEGLUMINE AND DIATRIZOATE SODIUM 15 ML: 660; 100 LIQUID ORAL; RECTAL at 09:38

## 2020-10-27 RX ADMIN — Medication 10 ML: at 11:22

## 2020-10-27 RX ADMIN — PANTOPRAZOLE SODIUM 40 MG: 40 TABLET, DELAYED RELEASE ORAL at 09:37

## 2020-10-27 RX ADMIN — IOPAMIDOL 100 ML: 755 INJECTION, SOLUTION INTRAVENOUS at 11:22

## 2020-10-27 RX ADMIN — GUAIFENESIN 600 MG: 600 TABLET, EXTENDED RELEASE ORAL at 21:15

## 2020-10-27 RX ADMIN — GUAIFENESIN 600 MG: 600 TABLET, EXTENDED RELEASE ORAL at 09:37

## 2020-10-27 RX ADMIN — Medication 10 ML: at 21:16

## 2020-10-27 RX ADMIN — ACETAMINOPHEN 650 MG: 325 TABLET, FILM COATED ORAL at 09:45

## 2020-10-27 RX ADMIN — ACETAMINOPHEN 650 MG: 325 TABLET, FILM COATED ORAL at 16:23

## 2020-10-27 RX ADMIN — TIMOLOL MALEATE 1 DROP: 5 SOLUTION OPHTHALMIC at 09:00

## 2020-10-27 RX ADMIN — LATANOPROST 1 DROP: 50 SOLUTION OPHTHALMIC at 22:00

## 2020-10-27 RX ADMIN — SODIUM CHLORIDE 100 ML: 900 INJECTION, SOLUTION INTRAVENOUS at 11:22

## 2020-10-27 NOTE — PROCEDURES
PROCEDURE:    DIAGNOSTIC/THERAPEUTIC THORACENTESIS        PRE-OP DIAGNOSIS:    R PLEURAL EFFUSION    POST-OP DIAGNOSIS:    R PLEURAL EFFUSION      ANESTHESIA:    LOCAL ANESTHESIA WITH 1% LIDOCAINE 10 CC TOTAL. CHEST ULTRASOUND FINDINGS:    A Turbo-M, Sonosite ultrasound with a 5-16 mHz probe was used to image the chest and localize the pleural effusion on the Right chest.    A large anechoic space was seen on the Right consistent with an uncomplicated pleural effusion. DESCRIPTION OF PROCEDURE:    After obtaining informed consent and localizing the safest location for thoracentesis, the  10 intercostal space was marked with a blunt, plastic needle cap in the mid scapular line. An ADINCON AK-0100 Pleral-Seal thoracentesis kit was used to perform the procedure. The skin was  cleansed with the supplied  chlorhexididne swab and then draped in the usual fasion. Using the previously marked location as a giude, a 22 G 1.5 inch needle was used to inject 10 cc of 1% lidocaine into the skin and subcutaneous tissue, as well as onto the underlying rib and inter-costal muscles, pleural fluid was aspirated to assure proper location, prior to removing the anesthesia needle. A 3mm  incision was then made, with the supplied scalpel in the usual fashion to facilitate the insertiopn of the thoracentesis needle. The needle with an 8French thoracentesis catheter was then introduced into the chest through the previously made incision in the usual fashion, the rib localized with the needle, and the catheter then marched over the rib into the pleural space. After aspirating fluid, the thoracentesis catheter was then placed into the chest using the needle itself as a trocar. The needle was then removed and the catheter was attached to the supplied tubing without complication. 2800 cc of Bloody fluid, was aspirated and sent for analysis.       Fluid was sent for the following tests:    Cell count with differential  LDH  Glucose  Total protein  Cytology  Fungus  Routine culture and Gram stain           EBL:     1 cc      COMPLICATIONS:  None    Reji Cervantes MD

## 2020-10-27 NOTE — PROGRESS NOTES
Problem: Falls - Risk of  Goal: *Absence of Falls  Description: Document Albino Messing Fall Risk and appropriate interventions in the flowsheet.   10/27/2020 1557 by Momo Chacon  Outcome: Progressing Towards Goal  Note: Fall Risk Interventions:  Mobility Interventions: Communicate number of staff needed for ambulation/transfer, Patient to call before getting OOB         Medication Interventions: Assess postural VS orthostatic hypotension, Patient to call before getting OOB, Teach patient to arise slowly    Elimination Interventions: Call light in reach, Patient to call for help with toileting needs           10/27/2020 1556 by Momo Chacon  Outcome: Progressing Towards Goal  Note: Fall Risk Interventions:  Mobility Interventions: Communicate number of staff needed for ambulation/transfer, Patient to call before getting OOB         Medication Interventions: Assess postural VS orthostatic hypotension, Patient to call before getting OOB, Teach patient to arise slowly    Elimination Interventions: Call light in reach, Patient to call for help with toileting needs              Problem: Patient Education: Go to Patient Education Activity  Goal: Patient/Family Education  10/27/2020 1557 by Momo Chacon  Outcome: Progressing Towards Goal  10/27/2020 1556 by Momo Chacon  Outcome: Progressing Towards Goal

## 2020-10-27 NOTE — PROGRESS NOTES
Progress Note    Patient: Jania Cheng MRN: [id number]  SSN: [id number]    YOB: 1940  Age: 79 y.o. Sex: female      Admit Date: 10/26/2020    LOS: 1 day     Subjective:   79 y.o. female who came to ER due to coughing, shortness of breath for the past 3 days. Patient seen and examined at bedside. SOB and cough, no chest pain, no abdominal pain. Objective:     Vitals:    10/27/20 0254 10/27/20 0749 10/27/20 1132 10/27/20 1522   BP: 121/75 127/65 95/65 (!) 105/50   Pulse: 92 (!) 112 (!) 109 (!) 108   Resp: 22 20 19 18   Temp: 98 °F (36.7 °C) 98.1 °F (36.7 °C) 97.7 °F (36.5 °C) 97.8 °F (36.6 °C)   SpO2: 95% 91% 95% 97%   Weight:       Height:            Intake and Output:  Current Shift: 10/27 0701 - 10/27 1900  In: 480 [P.O.:480]  Out: -   Last three shifts: No intake/output data recorded.     ROS  10 ROS negative except from stated on subjective    Physical Exam:   General: Alert, oriented, NAD  HEENT: NC/AT, EOM are intact  Neck: supple, no JVD  Cardiovascular: RRR, S1, S2, no murmurs  Respiratory: decrease breath sounds, no wheezes  Abdomen: Soft, NT, ND  Back: No CVA tenderness, no paraspinal tenderness  Extremities: LE without pedal edema, no erythema  Neuro: A&O, CN are intact, no focal deficits  Skin: no rash or ulcers  Psych: good mood and affect    Lab/Data Review:  I have personally reviewed patients laboratory data showing  Recent Results (from the past 24 hour(s))   METABOLIC PANEL, BASIC    Collection Time: 10/27/20  4:40 AM   Result Value Ref Range    Sodium 137 136 - 145 mmol/L    Potassium 4.2 3.5 - 5.1 mmol/L    Chloride 106 98 - 107 mmol/L    CO2 24 21 - 32 mmol/L    Anion gap 7 7 - 16 mmol/L    Glucose 123 (H) 65 - 100 mg/dL    BUN 9 8 - 23 MG/DL    Creatinine 0.60 0.6 - 1.0 MG/DL    GFR est AA >60 >60 ml/min/1.73m2    GFR est non-AA >60 >60 ml/min/1.73m2    Calcium 8.1 (L) 8.3 - 10.4 MG/DL   CBC WITH AUTOMATED DIFF    Collection Time: 10/27/20  4:40 AM   Result Value Ref Range    WBC 9.6 4.3 - 11.1 K/uL    RBC 3.16 (L) 4.05 - 5.2 M/uL    HGB 10.3 (L) 11.7 - 15.4 g/dL    HCT 31.0 (L) 35.8 - 46.3 %    MCV 98.1 (H) 79.6 - 97.8 FL    MCH 32.6 26.1 - 32.9 PG    MCHC 33.2 31.4 - 35.0 g/dL    RDW 13.4 11.9 - 14.6 %    PLATELET 568 161 - 849 K/uL    MPV 10.4 9.4 - 12.3 FL    ABSOLUTE NRBC 0.00 0.0 - 0.2 K/uL    DF AUTOMATED      NEUTROPHILS 79 (H) 43 - 78 %    LYMPHOCYTES 11 (L) 13 - 44 %    MONOCYTES 9 4.0 - 12.0 %    EOSINOPHILS 1 0.5 - 7.8 %    BASOPHILS 0 0.0 - 2.0 %    IMMATURE GRANULOCYTES 1 0.0 - 5.0 %    ABS. NEUTROPHILS 7.6 1.7 - 8.2 K/UL    ABS. LYMPHOCYTES 1.1 0.5 - 4.6 K/UL    ABS. MONOCYTES 0.8 0.1 - 1.3 K/UL    ABS. EOSINOPHILS 0.1 0.0 - 0.8 K/UL    ABS. BASOPHILS 0.0 0.0 - 0.2 K/UL    ABS. IMM. GRANS. 0.1 0.0 - 0.5 K/UL   LD    Collection Time: 10/27/20  4:40 AM   Result Value Ref Range     110 - 210 U/L   LDH, BODY FLUID    Collection Time: 10/27/20  9:00 AM   Result Value Ref Range    Fluid Type: PLEURAL FLUID      LD, body fld. 512 U/L   CELL COUNT, BODY FLUID    Collection Time: 10/27/20  9:00 AM   Result Value Ref Range    BODY FLUID TYPE PLEURAL FLUID      FLUID COLOR RED      FLUID APPEARANCE TURBID      FLUID RBC CT. 1,504,000 /cu mm    FLUID WBC COUNT 2,575 /cu mm    BR[facility] NEUTROPHIL 78 %    [facility] LYMPHS 16 %    [facility] MACROPHAGES 1 %    [facility] EOSINS 5 %   PROTEIN TOTAL, FLUID    Collection Time: 10/27/20  9:00 AM   Result Value Ref Range    Fluid Type: PLEURAL FLUID      Protein total, body fld. 4.5 g/dL        Image:  I have personally reviewed patients imaging showing  CT NECK CHEST ABD W CONT   Final Result   IMPRESSION:     1. No clear primary malignancy demonstrated. A persistent moderate right   pleural effusion is seen which demonstrates abnormal pleural nodularity which   can be an indicator of a malignant effusion. Recommend correlation with cytology   from the recent thoracentesis.  Additional left dependent pleural nodularity is   seen which could represent benign atelectatic changes although this should be   further assessed if the patient is subsequently found to have a malignant   process. Portions of the right lung remains atelectatic despite the improved   right pleural effusion. It is difficult to completely exclude a concerning   lesion at this level although no clearly demonstrated mass is seen. No findings   concerning for malignancy are otherwise seen in the neck, or abdomen. This report was made using voice transcription. Despite my best efforts to avoid   any, transcription errors may persist. If there is any question about the   accuracy of the report or need for clarification, then please call [phone], or text me through [email or web address] for clarification or correction. CT CHEST W CONT   Final Result   IMPRESSION:   1. Large right pleural effusion with collapse of the right lower lobe and   majority of the right middle lobe and mass effect on the mediastinum and   inferior displacement of the diaphragm. . No pericardial fluid. 2. No obstructing right hilar mass. Reimaging can performed after fluid removal   for further evaluation. 3. No pulmonary embolism. XR CHEST PA LAT   Final Result   IMPRESSION:   Moderate right pleural effusion, new from exam on September 18, 2015. Hospital problems     Principal Problem:    Pleural effusion (10/26/2020)    Active Problems:    Irritable bowel syndrome with diarrhea (9/16/2016)      Gastroesophageal reflux disease without esophagitis (9/22/2017)      Hypoxemia (10/27/2020)        Assessment and Plan:   79 y.o. female who came to ER due to coughing, shortness of breath for the past 3 days.     1. Acute hypoxia in the setting of pleural effusion concerning of CAP and underlying malignancy   - O2 therapy  - IV antibiotics  - S/p thoracentesis  - Symptomatic management  - Pulm recs  - SARS CoV 2 PCR pending  - Droplet plus precautions for now    IBS   Continue home medications.       GERD   Continue home medications. DVT prophylaxis : SCD     I have reviewed, updated, and verified this note's content and spent 38 minutes of my 42 minutes visit performing counseling and coordination of care regarding medical management.      Signed By: Dank Faith MD     October 27, 2020

## 2020-10-27 NOTE — CONSULTS
CONSULT NOTE    Georgie Shetty    10/27/2020    Date of Admission:  10/26/2020    The patient's chart is reviewed and the patient is discussed with the staff. Subjective:     Patient is a 79 y.o.  female seen and evaluated at the request of Dr. Isrrael Lockwood for the evaluation of pleural effusion. Patient presented to Er with few daysd of progressive SOB, also cough with some yellow sputum. She is also very weak. It came on quite suddently. No history of weight loss, just no appetite last 3 days. No hemoptysis. No history of malignancy. Review of Systems  A comprehensive review of systems was negative except for that written in the HPI. 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 Taking? bi-est/progest 1.5/100 (BI-EST/PROGEST 1.5/100) compound 10/26/2020 at Unknown time  Yes Yes   Sig: Apply  to affected area. COMPOUND= Bi- est/progest 1.5/ 100 Cream   cetirizine (ZYRTEC) 10 mg tablet 10/19/2020 at Unknown time  Yes Yes   Sig: Take  by mouth daily. esomeprazole (NEXIUM) 20 mg capsule 10/26/2020 at Unknown time  Yes Yes   Sig: Take 40 mg by mouth every morning.    estradiol (ESTRACE) 0.01 % (0.1 mg/gram) vaginal cream 10/26/2020 at Unknown time  No Yes   Sig: Use 2 grams nightly daily   hydrocortisone-pramoxine (ANALPRAM-HC) 2.5-1 % rectal cream 10/26/2020 at Unknown time  No Yes   Sig: Insert  into rectum three (3) times daily.   hyoscyamine SL (LEVSIN/SL) 0.125 mg SL tablet 10/26/2020 at Unknown time  No Yes   Si Tab by SubLINGual route every four (4) hours as needed for Cramping. Indications: irritable colon   latanoprost (XALATAN) 0.005 % ophthalmic solution 10/25/2020 at Unknown time  Yes Yes   Sig: Administer 1 Drop to both eyes nightly. timolol (TIMOPTIC) 0.5 % ophthalmic solution 10/25/2020 at Unknown time  Yes Yes   Sig: PLACE 1 DROP IN BOTH EYES IN THE MORNING   triamcinolone (NASACORT AQ) 55 mcg nasal inhaler 2020 at Unknown time  Yes Yes   Si Sprays daily.       Facility-Administered Medications: None       Past Medical History:   Diagnosis Date    Arrhythmia     while in ER a week ago-irregular/ Regular rythm today    Arthritis     osteo-left hand    Autoimmune disease ([facility] [address]     fibromyalgia    Compression fracture of T12 vertebra ([facility] [address] 2014    Endometriosis     hyst    Fibrocystic breast     Fibromyalgia     GERD (gastroesophageal reflux disease)     nexium daily    Glaucoma     Followed by Dr. Preet Wheeler Headache     rare (not migraines)    IBS (irritable bowel syndrome)     bentyl    Ovarian cyst     hx of-bilateral S&O    Rectal itching 3/24/2017    UTI (urinary tract infection)     hx of; last one 2-3 years ago    Vaginitis, atrophic     asymptomatic at present     Past Surgical History:   Procedure Laterality Date    HX BREAST BIOPSY Bilateral     HX HEMORRHOIDECTOMY  1970    HX HYSTERECTOMY Bilateral 1986    full    HX KYPHOPLASTY N/A 2014    HX OVARIAN CYST REMOVAL      Both ovaries have been removed    HX [facility]    HX VAGINAL VAULT SUSPENSION       Social History     Socioeconomic History    Marital status:      Spouse name: Not on file    Number of children: Not on file    Years of education: Not on file    Highest education level: Not on file   Occupational History    Not on file   Social Needs    Financial resource strain: Not on file    Food insecurity     Worry: Not on file     Inability: Not on file    Transportation needs     Medical: Not on file     Non-medical: Not on file   Tobacco Use    Smoking status: Former Smoker     Last attempt to quit: 1986     Years since quittin.2    Smokeless tobacco: Never Used   Substance and Sexual Activity    Alcohol use:  Yes     Alcohol/week: 5.8 standard drinks     Types: 7 Glasses of wine per week    Drug use: No    Sexual activity: Yes     Partners: Male     Birth control/protection: Surgical   Lifestyle    Physical activity     Days per week: Not on file     Minutes per session: Not on file    Stress: Not on file   Relationships    Social connections     Talks on phone: Not on file     Gets together: Not on file     Attends Congregational service: Not on file     Active member of club or organization: Not on file     Attends meetings of clubs or organizations: Not on file     Relationship status: Not on file    Intimate partner violence     Fear of current or ex partner: Not on file     Emotionally abused: Not on file     Physically abused: Not on file     Forced sexual activity: Not on file   Other Topics Concern    Not on file   Social History Narrative    Not on file     Family History   Problem Relation Age of Onset    Arthritis-osteo Mother     GERD Mother     Cancer Mother         stomach cancer    Elevated Lipids Father     Heart Disease Father 76    Hypertension Father     Heart Attack Father     GERD Father     GERD Brother     GERD Son     GERD Brother     GERD Brother     Thyroid Disease Daughter     Breast Cancer Neg Hx      Allergies   Allergen Reactions    Seasonale [Levonorgestrel-Ethinyl Estrad] Runny Nose and Sneezing     Patient denies ever taking medication    Ciprofibrate Diarrhea    Penicillins Rash       Current Facility-Administered Medications   Medication Dose Route Frequency    sodium chloride (NS) flush 5-40 mL  5-40 mL IntraVENous Q8H    sodium chloride (NS) flush 5-40 mL  5-40 mL IntraVENous PRN    acetaminophen (TYLENOL) tablet 650 mg  650 mg Oral Q6H PRN    Or    acetaminophen (TYLENOL) suppository 650 mg  650 mg Rectal Q6H PRN    polyethylene glycol (MIRALAX) packet 17 g  17 g Oral DAILY PRN    promethazine (PHENERGAN) tablet 12.5 mg  12.5 mg Oral Q6H PRN    0.9% sodium chloride infusion  50 mL/hr IntraVENous CONTINUOUS    loratadine (CLARITIN) tablet 10 mg  10 mg Oral DAILY    pantoprazole (PROTONIX) tablet 40 mg  40 mg Oral ACB    hyoscyamine SL (LEVSIN/SL) tablet 0.125 mg  0.125 mg SubLINGual Q4H PRN    latanoprost (XALATAN) 0.005 % ophthalmic solution 1 Drop  1 Drop Both Eyes QHS    timolol (TIMOPTIC) 0.5 % ophthalmic solution 1 Drop  1 Drop Both Eyes DAILY    guaiFENesin ER (MUCINEX) tablet 600 mg  600 mg Oral Q12H    levoFLOXacin (LEVAQUIN) 500 mg in D5W IVPB  500 mg IntraVENous Q24H    albuterol-ipratropium (DUO-NEB) 2.5 MG-0.5 MG/3 ML  3 mL Nebulization Q4H PRN    bismuth subsalicylate (PEPTO-BISMOL) oral suspension 30 mL  30 mL Oral Q6H PRN    hydrocortisone-pramoxine (ANALPRAM-HC) 2.5-1 % (4g) cream   Rectal TID PRN         Objective:     Vitals:    10/26/20 1800 10/26/20 2241 10/27/20 0254 10/27/20 0749   BP: 113/71 110/60 121/75 127/65   Pulse: (!) 122 (!) 114 92 (!) 112   Resp: 22 22 22 20   Temp: 98.2 °F (36.8 °C) 98.2 °F (36.8 °C) 98 °F (36.7 °C) 98.1 °F (36.7 °C)   SpO2: 94% 94% 95% 91%   Weight:       Height:           PHYSICAL EXAM     Constitutional:  the patient is well developed and in no acute distress  EENMT:  Sclera clear, pupils equal, oral mucosa moist  Respiratory:decrease BS on R  Cardiovascular:  RRR without M,G,R  Gastrointestinal: soft and non-tender; with positive bowel sounds. Musculoskeletal: warm without cyanosis. There is no lower extremity edema.   Skin:  no jaundice or rashes, no wounds   Neurologic: no gross neuro deficits     Psychiatric:  alert and oriented x 3    Chest CT :          Recent Labs     10/27/20  0440 10/26/20  1208   WBC 9.6 10.3   HGB 10.3* 12.8   HCT 31.0* 38.4    217     Recent Labs     10/27/20  0440 10/26/20  1208    139   K 4.2 3.5    105   * 113*   CO2 24 27   BUN 9 8   CREA 0.60 0.83   CA 8.1* 9.2   ALB  --  3.5   TBILI  --  0.6   ALT  --  21       Recent Labs     10/26/20  1224   LAC 1.0       Assessment:  (Medical Decision Making)     Hospital Problems  Date Reviewed: 1/21/2020          Codes Class Noted POA    Hypoxemia ICD-10-CM: R09.02  ICD-9-CM: 799.02  10/27/2020 Unknown        * (Principal) Pleural effusion ICD-10-CM: J90  ICD-9-CM: 511.9  10/26/2020 Unknown        Gastroesophageal reflux disease without esophagitis ICD-10-CM: K21.9  ICD-9-CM: 530.81  9/22/2017 Yes        Irritable bowel syndrome with diarrhea ICD-10-CM: K58.0  ICD-9-CM: 564.1  9/16/2016 Yes              Plan:  (Medical Decision Making)     --   - US and thoracentesis today  -may need another CT afterwards to rule out mass     More than 50% of the time documented was spent in face-to-face contact with the patient and in the care of the patient on the floor/unit where the patient is located. Thank you very much for this referral.  We appreciate the opportunity to participate in this patient's care. Will follow along with above stated plan.     Janis Carvajal MD

## 2020-10-27 NOTE — PROGRESS NOTES
Care Management Interventions  PCP Verified by CM: Yes  Palliative Care Criteria Met (RRAT>21 & CHF Dx)?: No  Transition of Care Consult (CM Consult): Discharge Planning  Discharge Durable Medical Equipment: No(hospital bed, walker(belonged to her ))  Physical Therapy Consult: No  Occupational Therapy Consult: No  Speech Therapy Consult: No  Current Support Network: Lives Alone  Confirm Follow Up Transport: Self  Discharge Location  Discharge Placement: Home  Met with patient for d/c planning. Confirmed Demographics and contact information. Patient alert and oriented x 3, independent of ADL's and lives alone. She states that she has 2 children who live locally that check on her as needed and help as they can. Her son Mauricio Sax [phone] and daughter An Bidding [phone]. She does not require any DME and states she does have hospital bed and walker from her  when he was in hospice but has not needed to use it. She states she is able to drive and has needed transportation. She has Medicare and [facility] [employer] and obtains medications at [address] [place] [address] on [address] [facility] [place] [address] [phone]. She voices no concerns or needs for d/c. She states feeling much better \"after they took the fluid off me this morning I am breathing better. \" She is S/P thoracentesis for pleural effusion currently on O2. Current d/c plan pending clinical progress but she hopes to return home when medically stable.  CM following

## 2020-10-27 NOTE — H&P
Date of Surgery Update:  Jerod Kiser was seen and examined. History and physical has been reviewed. The patient has been examined.  There have been no significant clinical changes since the completion of the originally dated History and Physical.    Signed By: Vira King MD     October 27, 2020 9:20 AM

## 2020-10-27 NOTE — PROGRESS NOTES
Problem: Falls - Risk of  Goal: *Absence of Falls  Description: Document Carlito Click Fall Risk and appropriate interventions in the flowsheet.   Outcome: Progressing Towards Goal  Note: Fall Risk Interventions:  Mobility Interventions: Communicate number of staff needed for ambulation/transfer, Patient to call before getting OOB         Medication Interventions: Assess postural VS orthostatic hypotension, Patient to call before getting OOB, Teach patient to arise slowly    Elimination Interventions: Call light in reach, Patient to call for help with toileting needs              Problem: Patient Education: Go to Patient Education Activity  Goal: Patient/Family Education  Outcome: Progressing Towards Goal

## 2020-10-27 NOTE — PROGRESS NOTES
Problem: Falls - Risk of  Goal: *Absence of Falls  Description: Document John Otero Fall Risk and appropriate interventions in the flowsheet.   Outcome: Progressing Towards Goal  Note: Fall Risk Interventions:  Mobility Interventions: Communicate number of staff needed for ambulation/transfer         Medication Interventions: Assess postural VS orthostatic hypotension    Elimination Interventions: Call light in reach

## 2020-10-28 LAB
ANION GAP SERPL CALC-SCNC: 3 MMOL/L (ref 7–16)
BASOPHILS # BLD: 0 K/UL (ref 0–0.2)
BASOPHILS NFR BLD: 0 % (ref 0–2)
BUN SERPL-MCNC: 7 MG/DL (ref 8–23)
CALCIUM SERPL-MCNC: 7.7 MG/DL (ref 8.3–10.4)
CHLORIDE SERPL-SCNC: 110 MMOL/L (ref 98–107)
CO2 SERPL-SCNC: 27 MMOL/L (ref 21–32)
COVID-19 RAPID TEST, COVR: NOT DETECTED
CREAT SERPL-MCNC: 0.56 MG/DL (ref 0.6–1)
DIFFERENTIAL METHOD BLD: ABNORMAL
EOSINOPHIL # BLD: 0.3 K/UL (ref 0–0.8)
EOSINOPHIL NFR BLD: 4 % (ref 0.5–7.8)
ERYTHROCYTE [DISTWIDTH] IN BLOOD BY AUTOMATED COUNT: 13.4 % (ref 11.9–14.6)
GLUCOSE SERPL-MCNC: 96 MG/DL (ref 65–100)
HCT VFR BLD AUTO: 24.3 % (ref 35.8–46.3)
HGB BLD-MCNC: 8.2 G/DL (ref 11.7–15.4)
IMM GRANULOCYTES # BLD AUTO: 0.1 K/UL (ref 0–0.5)
IMM GRANULOCYTES NFR BLD AUTO: 1 % (ref 0–5)
LYMPHOCYTES # BLD: 1.3 K/UL (ref 0.5–4.6)
LYMPHOCYTES NFR BLD: 18 % (ref 13–44)
MCH RBC QN AUTO: 32.8 PG (ref 26.1–32.9)
MCHC RBC AUTO-ENTMCNC: 33.7 G/DL (ref 31.4–35)
MCV RBC AUTO: 97.2 FL (ref 79.6–97.8)
MONOCYTES # BLD: 0.8 K/UL (ref 0.1–1.3)
MONOCYTES NFR BLD: 10 % (ref 4–12)
NEUTS SEG # BLD: 5.1 K/UL (ref 1.7–8.2)
NEUTS SEG NFR BLD: 67 % (ref 43–78)
NON-GYNECOLOGIC CYTOLOGY REPRT: NORMAL
NON-GYNECOLOGIC CYTOLOGY REPRT: NORMAL
NRBC # BLD: 0 K/UL (ref 0–0.2)
PLATELET # BLD AUTO: 162 K/UL (ref 150–450)
PMV BLD AUTO: 10 FL (ref 9.4–12.3)
POTASSIUM SERPL-SCNC: 3.7 MMOL/L (ref 3.5–5.1)
RBC # BLD AUTO: 2.5 M/UL (ref 4.05–5.2)
SARS COV-2, XPGCVT: NEGATIVE
SODIUM SERPL-SCNC: 140 MMOL/L (ref 136–145)
SOURCE, COVRS: NORMAL
SPECIMEN SOURCE: NORMAL
SPECIMEN SOURCE: NORMAL
WBC # BLD AUTO: 7.6 K/UL (ref 4.3–11.1)

## 2020-10-28 PROCEDURE — 74011000250 HC RX REV CODE- 250: Performed by: INTERNAL MEDICINE

## 2020-10-28 PROCEDURE — BH4BZZZ ULTRASONOGRAPHY OF CHEST WALL: ICD-10-PCS | Performed by: INTERNAL MEDICINE

## 2020-10-28 PROCEDURE — 80048 BASIC METABOLIC PNL TOTAL CA: CPT

## 2020-10-28 PROCEDURE — 75810000165 HC THORACENTESIS

## 2020-10-28 PROCEDURE — 0W993ZZ DRAINAGE OF RIGHT PLEURAL CAVITY, PERCUTANEOUS APPROACH: ICD-10-PCS | Performed by: INTERNAL MEDICINE

## 2020-10-28 PROCEDURE — 77030014146 HC TY THORCENT/PARACENT BD -B

## 2020-10-28 PROCEDURE — 77030013140 HC MSK NEB VYRM -A

## 2020-10-28 PROCEDURE — 74011250637 HC RX REV CODE- 250/637: Performed by: INTERNAL MEDICINE

## 2020-10-28 PROCEDURE — 76604 US EXAM CHEST: CPT | Performed by: INTERNAL MEDICINE

## 2020-10-28 PROCEDURE — 65270000029 HC RM PRIVATE

## 2020-10-28 PROCEDURE — 88305 TISSUE EXAM BY PATHOLOGIST: CPT

## 2020-10-28 PROCEDURE — 88112 CYTOPATH CELL ENHANCE TECH: CPT

## 2020-10-28 PROCEDURE — 99232 SBSQ HOSP IP/OBS MODERATE 35: CPT | Performed by: INTERNAL MEDICINE

## 2020-10-28 PROCEDURE — 32555 ASPIRATE PLEURA W/ IMAGING: CPT | Performed by: INTERNAL MEDICINE

## 2020-10-28 PROCEDURE — 94640 AIRWAY INHALATION TREATMENT: CPT

## 2020-10-28 PROCEDURE — 36415 COLL VENOUS BLD VENIPUNCTURE: CPT

## 2020-10-28 PROCEDURE — 85025 COMPLETE CBC W/AUTO DIFF WBC: CPT

## 2020-10-28 RX ORDER — LEVOFLOXACIN 500 MG/1
500 TABLET, FILM COATED ORAL EVERY 24 HOURS
Status: DISCONTINUED | OUTPATIENT
Start: 2020-10-28 | End: 2020-10-29

## 2020-10-28 RX ORDER — HYDROCODONE BITARTRATE AND ACETAMINOPHEN 5; 325 MG/1; MG/1
1 TABLET ORAL
Status: DISCONTINUED | OUTPATIENT
Start: 2020-10-28 | End: 2020-10-30 | Stop reason: HOSPADM

## 2020-10-28 RX ORDER — ALBUTEROL SULFATE 0.83 MG/ML
2.5 SOLUTION RESPIRATORY (INHALATION)
Status: DISCONTINUED | OUTPATIENT
Start: 2020-10-28 | End: 2020-10-29

## 2020-10-28 RX ADMIN — ACETAMINOPHEN 650 MG: 325 TABLET, FILM COATED ORAL at 14:34

## 2020-10-28 RX ADMIN — LORATADINE 10 MG: 10 TABLET ORAL at 08:08

## 2020-10-28 RX ADMIN — Medication 10 ML: at 14:36

## 2020-10-28 RX ADMIN — LATANOPROST 1 DROP: 50 SOLUTION OPHTHALMIC at 22:00

## 2020-10-28 RX ADMIN — Medication 10 ML: at 21:19

## 2020-10-28 RX ADMIN — ACETAMINOPHEN 650 MG: 325 TABLET, FILM COATED ORAL at 08:08

## 2020-10-28 RX ADMIN — GUAIFENESIN 600 MG: 600 TABLET, EXTENDED RELEASE ORAL at 21:19

## 2020-10-28 RX ADMIN — ALBUTEROL SULFATE 2.5 MG: 2.5 SOLUTION RESPIRATORY (INHALATION) at 10:37

## 2020-10-28 RX ADMIN — HYDROCODONE BITARTRATE AND ACETAMINOPHEN 1 TABLET: 5; 325 TABLET ORAL at 09:36

## 2020-10-28 RX ADMIN — ALBUTEROL SULFATE 2.5 MG: 2.5 SOLUTION RESPIRATORY (INHALATION) at 15:14

## 2020-10-28 RX ADMIN — HYOSCYAMINE SULFATE 0.12 MG: 0.12 TABLET ORAL; SUBLINGUAL at 21:19

## 2020-10-28 RX ADMIN — TIMOLOL MALEATE 1 DROP: 5 SOLUTION OPHTHALMIC at 09:00

## 2020-10-28 RX ADMIN — LEVOFLOXACIN 500 MG: 500 TABLET, FILM COATED ORAL at 12:47

## 2020-10-28 RX ADMIN — GUAIFENESIN 600 MG: 600 TABLET, EXTENDED RELEASE ORAL at 08:08

## 2020-10-28 RX ADMIN — PANTOPRAZOLE SODIUM 40 MG: 40 TABLET, DELAYED RELEASE ORAL at 08:09

## 2020-10-28 RX ADMIN — Medication 10 ML: at 06:00

## 2020-10-28 NOTE — PROGRESS NOTES
Susie Rutledge  Admission Date: 10/26/2020             Daily Progress Note: 10/28/2020    The patient's chart is reviewed and the patient is discussed with the staff. Patient is a 79 y.o.  female seen and evaluated at the request of Dr. Frida Marques for the evaluation of pleural effusion. Patient presented to Er with few daysd of progressive SOB, also cough with some yellow sputum. She is also very weak. It came on quite suddently. No history of weight loss, just no appetite last 3 days. No hemoptysis. No history of malignancy.   S./p L thoracentesis 10/27- 2800 cc bloody fluid remove     Subjective:     S/p thoracentesis yesterday   On 2L NC  Does not feel good today but breathing is better    Current Facility-Administered Medications   Medication Dose Route Frequency    sodium chloride (NS) flush 5-40 mL  5-40 mL IntraVENous Q8H    sodium chloride (NS) flush 5-40 mL  5-40 mL IntraVENous PRN    acetaminophen (TYLENOL) tablet 650 mg  650 mg Oral Q6H PRN    Or    acetaminophen (TYLENOL) suppository 650 mg  650 mg Rectal Q6H PRN    polyethylene glycol (MIRALAX) packet 17 g  17 g Oral DAILY PRN    promethazine (PHENERGAN) tablet 12.5 mg  12.5 mg Oral Q6H PRN    loratadine (CLARITIN) tablet 10 mg  10 mg Oral DAILY    pantoprazole (PROTONIX) tablet 40 mg  40 mg Oral ACB    hyoscyamine SL (LEVSIN/SL) tablet 0.125 mg  0.125 mg SubLINGual Q4H PRN    latanoprost (XALATAN) 0.005 % ophthalmic solution 1 Drop  1 Drop Both Eyes QHS    timolol (TIMOPTIC) 0.5 % ophthalmic solution 1 Drop  1 Drop Both Eyes DAILY    guaiFENesin ER (MUCINEX) tablet 600 mg  600 mg Oral Q12H    levoFLOXacin (LEVAQUIN) 500 mg in D5W IVPB  500 mg IntraVENous Q24H    albuterol-ipratropium (DUO-NEB) 2.5 MG-0.5 MG/3 ML  3 mL Nebulization Q4H PRN    bismuth subsalicylate (PEPTO-BISMOL) oral suspension 30 mL  30 mL Oral Q6H PRN    hydrocortisone-pramoxine (ANALPRAM-HC) 2.5-1 % (4g) cream   Rectal TID PRN       Review of Systems    Constitutional: negative for fever, chills, sweats  Cardiovascular: negative for chest pain, palpitations, syncope, edema  Gastrointestinal:  negative for dysphagia, reflux, vomiting, diarrhea, abdominal pain, or melena  Neurologic:  negative for focal weakness, numbness, headache    Objective:     Vitals:    10/27/20 1959 10/27/20 2248 10/28/20 0304 10/28/20 0750   BP: 104/68 (!) 98/58 (!) 94/58 (!) 93/51   Pulse: (!) 102 99 93 91   Resp: 18 18 18 18   Temp: 98.3 °F (36.8 °C) 98.8 °F (37.1 °C) 98.2 °F (36.8 °C) 98.6 °F (37 °C)   SpO2: 96% 91% 94% 92%   Weight:       Height:             Intake/Output Summary (Last 24 hours) at 10/28/2020 0818  Last data filed at 10/28/2020 0390  Gross per 24 hour   Intake 480 ml   Output 500 ml   Net -20 ml       Physical Exam:   Constitution:  the patient is well developed and in no acute distress  EENMT:  Sclera clear, pupils equal, oral mucosa moist  Respiratory: decrease BS on R   Cardiovascular:  RRR without M,G,R  Gastrointestinal: soft and non-tender; with positive bowel sounds. Musculoskeletal: warm without cyanosis. There is no lower extremity edema.   Skin:  no jaundice or rashes, no wounds   Neurologic: no gross neuro deficits     Psychiatric:  alert and oriented x 3    CXR:          Recent Labs     10/28/20  0533 10/27/20  0440 10/26/20  1208   WBC 7.6 9.6 10.3   HGB 8.2* 10.3* 12.8   HCT 24.3* 31.0* 38.4    186 217     Recent Labs     10/28/20  0533 10/27/20  0440 10/26/20  1208    137 139   K 3.7 4.2 3.5   * 106 105   CO2 27 24 27   GLU 96 123* 113*   BUN 7* 9 8   CREA 0.56* 0.60 0.83   CA 7.7* 8.1* 9.2   ALB  --   --  3.5   TBILI  --   --  0.6   ALT  --   --  21       Recent Labs     10/26/20  1224   LAC 1.0         Assessment:  (Medical Decision Making)     Hospital Problems  Date Reviewed: 1/21/2020          Codes Class Noted POA    Hypoxemia ICD-10-CM: R09.02  ICD-9-CM: 799.02  10/27/2020 Unknown        * (Principal) Pleural effusion ICD-10-CM: J90  ICD-9-CM: 511.9  10/26/2020 Unknown        Gastroesophageal reflux disease without esophagitis ICD-10-CM: K21.9  ICD-9-CM: 530.81  9/22/2017 Yes        Irritable bowel syndrome with diarrhea ICD-10-CM: K58.0  ICD-9-CM: 564.1  9/16/2016 Yes              Plan:  (Medical Decision Making)     -- cytology pending, chest CT reviewed, nothing obvious except pleural nodularity   -will repeat thoracentesis, and await cytology     More than 50% of the time documented was spent in face-to-face contact with the patient and in the care of the patient on the floor/unit where the patient is located.     Chantel Phan MD

## 2020-10-28 NOTE — H&P
Date of Surgery Update:  Jessica Pearl was seen and examined. History and physical has been reviewed. The patient has been examined.  There have been no significant clinical changes since the completion of the originally dated History and Physical.    Signed By: Anne Marie Clark MD     October 28, 2020 9:18 AM

## 2020-10-28 NOTE — PROGRESS NOTES
Pt sat up on side of bed for thoracentesis. Consent obtained. Time out performed. Pts vitals monitored throughout procedure. Right ultrasound done and pic taken of pleural fluid. ~1400 ml bloody pleural fluid from R.  Pt tolerated procedure well with no adverse rxn. Specimens sent to the lab x 1 and labeled appropriately. Site dressed appropriately and report given to pts RN.

## 2020-10-28 NOTE — PROGRESS NOTES
Initial visit with patient. Patient is [facility] in [facility]. Prayer and support given. Sol Polanco M.Div.

## 2020-10-28 NOTE — PROGRESS NOTES
Progress Note    Patient: Georgie Shetty MRN: [id number]  SSN: [id number]    YOB: 1940  Age: 79 y.o. Sex: female      Admit Date: 10/26/2020    LOS: 2 days     Subjective:   79 y.o. female who came to ER due to coughing, shortness of breath for the past 3 days. Patient seen and examined at bedside. Improved SOB after thoracentesis, still mild cough. No chest pain, no abdominal pain.      Objective:     Vitals:    10/28/20 0908 10/28/20 0915 10/28/20 1038 10/28/20 1115   BP: (!) 94/49 (!) 107/47  (!) 108/53   Pulse: (!) 119 (!) 120  98   Resp:    18   Temp:    98.2 °F (36.8 °C)   SpO2: 97% 95% 95% 93%   Weight:       Height:            Intake and Output:  Current Shift: 10/28 0701 - 10/28 1900  In: 240 [P.O.:240]  Out: -   Last three shifts: 10/26 1901 - 10/28 0700  In: 480 [P.O.:480]  Out: 500 [Urine:500]    ROS  10 ROS negative except from stated on subjective    Physical Exam:   General: Alert, oriented, NAD  HEENT: NC/AT, EOM are intact  Neck: supple, no JVD  Cardiovascular: RRR, S1, S2, no murmurs  Respiratory: decrease breath sounds, no wheezes  Abdomen: Soft, NT, ND  Back: No CVA tenderness, no paraspinal tenderness  Extremities: LE without pedal edema, no erythema  Neuro: A&O, CN are intact, no focal deficits  Skin: no rash or ulcers  Psych: good mood and affect    Lab/Data Review:  I have personally reviewed patients laboratory data showing  Recent Results (from the past 24 hour(s))   CBC WITH AUTOMATED DIFF    Collection Time: 10/28/20  5:33 AM   Result Value Ref Range    WBC 7.6 4.3 - 11.1 K/uL    RBC 2.50 (L) 4.05 - 5.2 M/uL    HGB 8.2 (L) 11.7 - 15.4 g/dL    HCT 24.3 (L) 35.8 - 46.3 %    MCV 97.2 79.6 - 97.8 FL    MCH 32.8 26.1 - 32.9 PG    MCHC 33.7 31.4 - 35.0 g/dL    RDW 13.4 11.9 - 14.6 %    PLATELET 082 040 - 174 K/uL    MPV 10.0 9.4 - 12.3 FL    ABSOLUTE NRBC 0.00 0.0 - 0.2 K/uL    DF AUTOMATED      NEUTROPHILS 67 43 - 78 %    LYMPHOCYTES 18 13 - 44 %    MONOCYTES 10 4.0 - 12.0 %    EOSINOPHILS 4 0.5 - 7.8 %    BASOPHILS 0 0.0 - 2.0 %    IMMATURE GRANULOCYTES 1 0.0 - 5.0 %    ABS. NEUTROPHILS 5.1 1.7 - 8.2 K/UL    ABS. LYMPHOCYTES 1.3 0.5 - 4.6 K/UL    ABS. MONOCYTES 0.8 0.1 - 1.3 K/UL    ABS. EOSINOPHILS 0.3 0.0 - 0.8 K/UL    ABS. BASOPHILS 0.0 0.0 - 0.2 K/UL    ABS. IMM. GRANS. 0.1 0.0 - 0.5 K/UL   METABOLIC PANEL, BASIC    Collection Time: 10/28/20  5:33 AM   Result Value Ref Range    Sodium 140 136 - 145 mmol/L    Potassium 3.7 3.5 - 5.1 mmol/L    Chloride 110 (H) 98 - 107 mmol/L    CO2 27 21 - 32 mmol/L    Anion gap 3 (L) 7 - 16 mmol/L    Glucose 96 65 - 100 mg/dL    BUN 7 (L) 8 - 23 MG/DL    Creatinine 0.56 (L) 0.6 - 1.0 MG/DL    GFR est AA >60 >60 ml/min/1.73m2    GFR est non-AA >60 >60 ml/min/1.73m2    Calcium 7.7 (L) 8.3 - 10.4 MG/DL   CYTOLOGY NON-GYN    Collection Time: 10/28/20  9:00 AM   Result Value Ref Range    Cytology, non-GYN        Specimen received in cytology. Pathology report generally follows within three business days, unless special studies (i.e. immunohistochemistry, molecular studies, etc) are required. Specimen Source PLEURAL FLUID          Image:  I have personally reviewed patients imaging showing  CT NECK CHEST ABD W CONT   Final Result   IMPRESSION:     1. No clear primary malignancy demonstrated. A persistent moderate right   pleural effusion is seen which demonstrates abnormal pleural nodularity which   can be an indicator of a malignant effusion. Recommend correlation with cytology   from the recent thoracentesis. Additional left dependent pleural nodularity is   seen which could represent benign atelectatic changes although this should be   further assessed if the patient is subsequently found to have a malignant   process. Portions of the right lung remains atelectatic despite the improved   right pleural effusion. It is difficult to completely exclude a concerning   lesion at this level although no clearly demonstrated mass is seen.  No findings concerning for malignancy are otherwise seen in the neck, or abdomen. This report was made using voice transcription. Despite my best efforts to avoid   any, transcription errors may persist. If there is any question about the   accuracy of the report or need for clarification, then please call [phone], or text me through [email or web address] for clarification or correction. CT CHEST W CONT   Final Result   IMPRESSION:   1. Large right pleural effusion with collapse of the right lower lobe and   majority of the right middle lobe and mass effect on the mediastinum and   inferior displacement of the diaphragm. . No pericardial fluid. 2. No obstructing right hilar mass. Reimaging can performed after fluid removal   for further evaluation. 3. No pulmonary embolism. XR CHEST PA LAT   Final Result   IMPRESSION:   Moderate right pleural effusion, new from exam on September 18, 2015. Hospital problems     Principal Problem:    Pleural effusion (10/26/2020)    Active Problems:    Irritable bowel syndrome with diarrhea (9/16/2016)      Gastroesophageal reflux disease without esophagitis (9/22/2017)      Hypoxemia (10/27/2020)        Assessment and Plan:   79 y.o. female who came to ER due to coughing, shortness of breath for the past 3 days. 1. Acute hypoxia in the setting of pleural effusion concerning of CAP and underlying malignancy   - O2 therapy to maintain O2 Sat>92%  - Continue levofloxacin for now  - S/p thoracentesis - follow cytology and cultures   - CT neck, chest and abdomen without clear primary malignancy   - Symptomatic management  - Pulm recs  - SARS CoV 2 PCR pending  - Droplet plus precautions for now    2. GERD   - Continue PPI    DVT prophylaxis : SCD     I have reviewed, updated, and verified this note's content and spent 38 minutes of my 42 minutes visit performing counseling and coordination of care regarding medical management.      Signed By: Fan Gutierrez Shanice Avila MD     October 28, 2020

## 2020-10-28 NOTE — PROGRESS NOTES
Care Management Interventions  PCP Verified by CM: Yes  Palliative Care Criteria Met (RRAT>21 & CHF Dx)?: No  Transition of Care Consult (CM Consult): Discharge Planning  Discharge Durable Medical Equipment: No(hospital bed, walker(belonged to her ))  Physical Therapy Consult: No  Occupational Therapy Consult: No  Speech Therapy Consult: No  Current Support Network: Lives Alone  Confirm Follow Up Transport: Self  Discharge Location  Discharge Placement: Home  Chart reviewed and d/c plans remain the same which is home with family.

## 2020-10-28 NOTE — PROCEDURES
PROCEDURE:    DIAGNOSTIC/THERAPEUTIC THOracentesis        PRE-OP DIAGNOSIS:    R PLEURAL EFFUSION    POST-OP DIAGNOSIS:    R PLEURAL EFFUSION        ANESTHESIA:    LOCAL ANESTHESIA WITH 1% LIDOCAINE 10 CC TOTAL. CHEST ULTRASOUND FINDINGS:    A Turbo-M, Sonosite ultrasound with a 5-16 mHz probe was used to image the chest and localize the pleural effusion on the Right chest.    A large anechoic space was seen on the Right consistent with an uncomplicated pleural effusion. DESCRIPTION OF PROCEDURE:    After obtaining informed consent and localizing the safest location for thoracentesis, the  11 intercostal space was marked with a blunt, plastic needle cap in the mid scapular line. An [place] AK-0100 Pleral-Seal thoracentesis kit was used to perform the procedure. The skin was  cleansed with the supplied  chlorhexididne swab and then draped in the usual fasion. Using the previously marked location as a giude, a 22 G 1.5 inch needle was used to inject 10 cc of 1% lidocaine into the skin and subcutaneous tissue, as well as onto the underlying rib and inter-costal muscles, pleural fluid was aspirated to assure proper location, prior to removing the anesthesia needle. A 3mm  incision was then made, with the supplied scalpel in the usual fashion to facilitate the insertiopn of the thoracentesis needle. The needle with an 8French thoracentesis catheter was then introduced into the chest through the previously made incision in the usual fashion, the rib localized with the needle, and the catheter then marched over the rib into the pleural space. After aspirating fluid, the thoracentesis catheter was then placed into the chest using the needle itself as a trocar. The needle was then removed and the catheter was attached to the supplied tubing without complication. 1800 cc of Bloody  fluid, was aspirated and sent for analysis. Fluid was sent for the following tests:   Only for cytology        Post procedure US confirmed complete drainage of the effusion    EBL:   None        COMPLICATIONS:    Lurline Meigs, MD'

## 2020-10-29 ENCOUNTER — APPOINTMENT (OUTPATIENT)
Dept: GENERAL RADIOLOGY | Age: 80
DRG: 194 | End: 2020-10-29
Attending: INTERNAL MEDICINE
Payer: MEDICARE

## 2020-10-29 LAB
ANION GAP SERPL CALC-SCNC: 2 MMOL/L (ref 7–16)
BASOPHILS # BLD: 0 K/UL (ref 0–0.2)
BASOPHILS NFR BLD: 0 % (ref 0–2)
BUN SERPL-MCNC: 6 MG/DL (ref 8–23)
CALCIUM SERPL-MCNC: 7.8 MG/DL (ref 8.3–10.4)
CHLORIDE SERPL-SCNC: 110 MMOL/L (ref 98–107)
CO2 SERPL-SCNC: 29 MMOL/L (ref 21–32)
CREAT SERPL-MCNC: 0.56 MG/DL (ref 0.6–1)
DIFFERENTIAL METHOD BLD: ABNORMAL
EOSINOPHIL # BLD: 0.3 K/UL (ref 0–0.8)
EOSINOPHIL NFR BLD: 6 % (ref 0.5–7.8)
ERYTHROCYTE [DISTWIDTH] IN BLOOD BY AUTOMATED COUNT: 13.7 % (ref 11.9–14.6)
GLUCOSE SERPL-MCNC: 94 MG/DL (ref 65–100)
HCT VFR BLD AUTO: 22.7 % (ref 35.8–46.3)
HGB BLD-MCNC: 7.5 G/DL (ref 11.7–15.4)
IMM GRANULOCYTES # BLD AUTO: 0.1 K/UL (ref 0–0.5)
IMM GRANULOCYTES NFR BLD AUTO: 1 % (ref 0–5)
LYMPHOCYTES # BLD: 1.3 K/UL (ref 0.5–4.6)
LYMPHOCYTES NFR BLD: 24 % (ref 13–44)
MCH RBC QN AUTO: 32.5 PG (ref 26.1–32.9)
MCHC RBC AUTO-ENTMCNC: 33 G/DL (ref 31.4–35)
MCV RBC AUTO: 98.3 FL (ref 79.6–97.8)
MONOCYTES # BLD: 0.5 K/UL (ref 0.1–1.3)
MONOCYTES NFR BLD: 9 % (ref 4–12)
NEUTS SEG # BLD: 3.3 K/UL (ref 1.7–8.2)
NEUTS SEG NFR BLD: 60 % (ref 43–78)
NRBC # BLD: 0 K/UL (ref 0–0.2)
PLATELET # BLD AUTO: 162 K/UL (ref 150–450)
PMV BLD AUTO: 9.8 FL (ref 9.4–12.3)
POTASSIUM SERPL-SCNC: 3.7 MMOL/L (ref 3.5–5.1)
RBC # BLD AUTO: 2.31 M/UL (ref 4.05–5.2)
SODIUM SERPL-SCNC: 141 MMOL/L (ref 136–145)
WBC # BLD AUTO: 5.5 K/UL (ref 4.3–11.1)

## 2020-10-29 PROCEDURE — 65270000029 HC RM PRIVATE

## 2020-10-29 PROCEDURE — 71045 X-RAY EXAM CHEST 1 VIEW: CPT

## 2020-10-29 PROCEDURE — 94760 N-INVAS EAR/PLS OXIMETRY 1: CPT

## 2020-10-29 PROCEDURE — 80048 BASIC METABOLIC PNL TOTAL CA: CPT

## 2020-10-29 PROCEDURE — 74011250636 HC RX REV CODE- 250/636: Performed by: INTERNAL MEDICINE

## 2020-10-29 PROCEDURE — 74011250637 HC RX REV CODE- 250/637: Performed by: INTERNAL MEDICINE

## 2020-10-29 PROCEDURE — 74011000250 HC RX REV CODE- 250: Performed by: INTERNAL MEDICINE

## 2020-10-29 PROCEDURE — 99232 SBSQ HOSP IP/OBS MODERATE 35: CPT | Performed by: INTERNAL MEDICINE

## 2020-10-29 PROCEDURE — C8929 TTE W OR WO FOL WCON,DOPPLER: HCPCS

## 2020-10-29 PROCEDURE — 85025 COMPLETE CBC W/AUTO DIFF WBC: CPT

## 2020-10-29 PROCEDURE — 74011000258 HC RX REV CODE- 258: Performed by: INTERNAL MEDICINE

## 2020-10-29 PROCEDURE — 36415 COLL VENOUS BLD VENIPUNCTURE: CPT

## 2020-10-29 RX ORDER — DOXYCYCLINE 100 MG/1
100 CAPSULE ORAL EVERY 12 HOURS
Status: DISCONTINUED | OUTPATIENT
Start: 2020-10-29 | End: 2020-10-30 | Stop reason: HOSPADM

## 2020-10-29 RX ADMIN — GUAIFENESIN 600 MG: 600 TABLET, EXTENDED RELEASE ORAL at 07:59

## 2020-10-29 RX ADMIN — HYDROCODONE BITARTRATE AND ACETAMINOPHEN 1 TABLET: 5; 325 TABLET ORAL at 01:50

## 2020-10-29 RX ADMIN — ACETAMINOPHEN 650 MG: 325 TABLET, FILM COATED ORAL at 20:21

## 2020-10-29 RX ADMIN — CEFTRIAXONE 1 G: 1 INJECTION, POWDER, FOR SOLUTION INTRAMUSCULAR; INTRAVENOUS at 15:25

## 2020-10-29 RX ADMIN — LORATADINE 10 MG: 10 TABLET ORAL at 07:59

## 2020-10-29 RX ADMIN — DOXYCYCLINE HYCLATE 100 MG: 100 CAPSULE ORAL at 15:24

## 2020-10-29 RX ADMIN — TIMOLOL MALEATE 1 DROP: 5 SOLUTION OPHTHALMIC at 09:00

## 2020-10-29 RX ADMIN — ACETAMINOPHEN 650 MG: 325 TABLET, FILM COATED ORAL at 07:59

## 2020-10-29 RX ADMIN — Medication 10 ML: at 21:02

## 2020-10-29 RX ADMIN — Medication 10 ML: at 06:00

## 2020-10-29 RX ADMIN — DOXYCYCLINE HYCLATE 100 MG: 100 CAPSULE ORAL at 20:21

## 2020-10-29 RX ADMIN — PANTOPRAZOLE SODIUM 40 MG: 40 TABLET, DELAYED RELEASE ORAL at 07:59

## 2020-10-29 RX ADMIN — LATANOPROST 1 DROP: 50 SOLUTION OPHTHALMIC at 21:02

## 2020-10-29 RX ADMIN — PERFLUTREN 1 ML: 6.52 INJECTION, SUSPENSION INTRAVENOUS at 10:30

## 2020-10-29 RX ADMIN — Medication 10 ML: at 14:02

## 2020-10-29 RX ADMIN — GUAIFENESIN 600 MG: 600 TABLET, EXTENDED RELEASE ORAL at 20:21

## 2020-10-29 NOTE — PROGRESS NOTES
Progress Note    Patient: Jammie López MRN: [id number]  SSN: [id number]    YOB: 1940  Age: 79 y.o. Sex: female      Admit Date: 10/26/2020    LOS: 3 days     Subjective:   79 y.o. female who came to ER due to coughing, shortness of breath for the past 3 days. Patient seen and examined at bedside. Improved SOB after thoracentesis, still mild cough. No chest pain, no abdominal pain.      Objective:     Vitals:    10/29/20 0805 10/29/20 1126 10/29/20 1203 10/29/20 1520   BP: 110/77  (!) 107/54 120/76   Pulse: 92  87 88   Resp: 20  20 16   Temp: 97.7 °F (36.5 °C)  97.8 °F (36.6 °C) 97.8 °F (36.6 °C)   SpO2: 97% 93% 93% 100%   Weight:       Height:            Intake and Output:  Current Shift: 10/29 0701 - 10/29 1900  In: 9148 [P.O.:1240]  Out: 800 [Urine:800]  Last three shifts: 10/27 1901 - 10/29 0700  In: 480 [P.O.:480]  Out: 1450 [Urine:1450]    ROS  10 ROS negative except from stated on subjective    Physical Exam:   General: Alert, oriented, NAD  HEENT: NC/AT, EOM are intact  Neck: supple, no JVD  Cardiovascular: RRR, S1, S2, no murmurs  Respiratory: decrease breath sounds, no wheezes  Abdomen: Soft, NT, ND  Back: No CVA tenderness, no paraspinal tenderness  Extremities: LE without pedal edema, no erythema  Neuro: A&O, CN are intact, no focal deficits  Skin: no rash or ulcers  Psych: good mood and affect    Lab/Data Review:  I have personally reviewed patients laboratory data showing  Recent Results (from the past 24 hour(s))   CBC WITH AUTOMATED DIFF    Collection Time: 10/29/20  5:49 AM   Result Value Ref Range    WBC 5.5 4.3 - 11.1 K/uL    RBC 2.31 (L) 4.05 - 5.2 M/uL    HGB 7.5 (L) 11.7 - 15.4 g/dL    HCT 22.7 (L) 35.8 - 46.3 %    MCV 98.3 (H) 79.6 - 97.8 FL    MCH 32.5 26.1 - 32.9 PG    MCHC 33.0 31.4 - 35.0 g/dL    RDW 13.7 11.9 - 14.6 %    PLATELET 176 656 - 088 K/uL    MPV 9.8 9.4 - 12.3 FL    ABSOLUTE NRBC 0.00 0.0 - 0.2 K/uL    DF AUTOMATED      NEUTROPHILS 60 43 - 78 % LYMPHOCYTES 24 13 - 44 %    MONOCYTES 9 4.0 - 12.0 %    EOSINOPHILS 6 0.5 - 7.8 %    BASOPHILS 0 0.0 - 2.0 %    IMMATURE GRANULOCYTES 1 0.0 - 5.0 %    ABS. NEUTROPHILS 3.3 1.7 - 8.2 K/UL    ABS. LYMPHOCYTES 1.3 0.5 - 4.6 K/UL    ABS. MONOCYTES 0.5 0.1 - 1.3 K/UL    ABS. EOSINOPHILS 0.3 0.0 - 0.8 K/UL    ABS. BASOPHILS 0.0 0.0 - 0.2 K/UL    ABS. IMM. GRANS. 0.1 0.0 - 0.5 K/UL   METABOLIC PANEL, BASIC    Collection Time: 10/29/20  5:49 AM   Result Value Ref Range    Sodium 141 136 - 145 mmol/L    Potassium 3.7 3.5 - 5.1 mmol/L    Chloride 110 (H) 98 - 107 mmol/L    CO2 29 21 - 32 mmol/L    Anion gap 2 (L) 7 - 16 mmol/L    Glucose 94 65 - 100 mg/dL    BUN 6 (L) 8 - 23 MG/DL    Creatinine 0.56 (L) 0.6 - 1.0 MG/DL    GFR est AA >60 >60 ml/min/1.73m2    GFR est non-AA >60 >60 ml/min/1.73m2    Calcium 7.8 (L) 8.3 - 10.4 MG/DL        Image:  I have personally reviewed patients imaging showing  XR CHEST SNGL V   Final Result   IMPRESSION:   1. Improved however persistent small right pleural effusion. 2. Small right basilar heterogeneous air space opacity, possible atelectasis or   pneumonia with bibasilar discoid atelectasis evident. CT NECK CHEST ABD W CONT   Final Result   IMPRESSION:     1. No clear primary malignancy demonstrated. A persistent moderate right   pleural effusion is seen which demonstrates abnormal pleural nodularity which   can be an indicator of a malignant effusion. Recommend correlation with cytology   from the recent thoracentesis. Additional left dependent pleural nodularity is   seen which could represent benign atelectatic changes although this should be   further assessed if the patient is subsequently found to have a malignant   process. Portions of the right lung remains atelectatic despite the improved   right pleural effusion. It is difficult to completely exclude a concerning   lesion at this level although no clearly demonstrated mass is seen.  No findings   concerning for malignancy are otherwise seen in the neck, or abdomen. This report was made using voice transcription. Despite my best efforts to avoid   any, transcription errors may persist. If there is any question about the   accuracy of the report or need for clarification, then please call [phone], or text me through [email or web address] for clarification or correction. CT CHEST W CONT   Final Result   IMPRESSION:   1. Large right pleural effusion with collapse of the right lower lobe and   majority of the right middle lobe and mass effect on the mediastinum and   inferior displacement of the diaphragm. . No pericardial fluid. 2. No obstructing right hilar mass. Reimaging can performed after fluid removal   for further evaluation. 3. No pulmonary embolism. XR CHEST PA LAT   Final Result   IMPRESSION:   Moderate right pleural effusion, new from exam on September 18, 2015. Hospital problems     Principal Problem:    Pleural effusion (10/26/2020)    Active Problems:    Irritable bowel syndrome with diarrhea (9/16/2016)      Gastroesophageal reflux disease without esophagitis (9/22/2017)      Hypoxemia (10/27/2020)        Assessment and Plan:   79 y.o. female who came to ER due to coughing, shortness of breath for the past 3 days. 1. Acute hypoxia in the setting of pleural effusion concerning of CAP and underlying malignancy   - O2 therapy to maintain O2 Sat>92%  - D/C levofloxacin  - Start ceftriaxone and doxycycline   - S/p thoracentesis - follow cytology (malignant cells, pending immunohistochemical stain) and cultures (NGTD)  - CT neck, chest and abdomen without clear primary malignancy   - Symptomatic management  - Pulm recs  - SARS CoV 2 PCR negative  - Oncology outpatient referral     2.  GERD   - Continue PPI    DVT prophylaxis : SCD     I have reviewed, updated, and verified this note's content and spent 38 minutes of my 42 minutes visit performing counseling and coordination of care regarding medical management.      Signed By: Sid Alston MD     October 29, 2020

## 2020-10-29 NOTE — PROGRESS NOTES
Problem: Falls - Risk of  Goal: *Absence of Falls  Description: Document Cici Farmer Fall Risk and appropriate interventions in the flowsheet.   Outcome: Progressing Towards Goal  Note: Fall Risk Interventions:  Mobility Interventions: Communicate number of staff needed for ambulation/transfer         Medication Interventions: Assess postural VS orthostatic hypotension    Elimination Interventions: Call light in reach

## 2020-10-29 NOTE — PROGRESS NOTES
[facility]  Admission Date: 10/26/2020             Daily Progress Note: 10/29/2020    The patient's chart is reviewed and the patient is discussed with the staff. Patient is U 44 y.o.  female seen and evaluated at the request of Dr. Melissa Caruso for the evaluation of pleural effusion. Patient presented to Er with few daysd of progressive SOB, also cough with some yellow sputum. She is also very weak. It came on quite suddently. No history of weight loss, just no appetite last 3 days. No hemoptysis. No history of malignancy. S./p L thoracentesis 10/27- 2800 cc bloody fluid removed. 10/28 another 1800cc bloody from right. Subjective:     Patient feels better after having second thoracentesis yesterday. On room air currently. No new complaints.      Current Facility-Administered Medications   Medication Dose Route Frequency    albuterol (PROVENTIL VENTOLIN) nebulizer solution 2.5 mg  2.5 mg Nebulization Q6HWA RT    HYDROcodone-acetaminophen (NORCO) 5-325 mg per tablet 1 Tab  1 Tab Oral Q6H PRN    levoFLOXacin (LEVAQUIN) tablet 500 mg  500 mg Oral Q24H    sodium chloride (NS) flush 5-40 mL  5-40 mL IntraVENous Q8H    sodium chloride (NS) flush 5-40 mL  5-40 mL IntraVENous PRN    acetaminophen (TYLENOL) tablet 650 mg  650 mg Oral Q6H PRN    Or    acetaminophen (TYLENOL) suppository 650 mg  650 mg Rectal Q6H PRN    polyethylene glycol (MIRALAX) packet 17 g  17 g Oral DAILY PRN    promethazine (PHENERGAN) tablet 12.5 mg  12.5 mg Oral Q6H PRN    loratadine (CLARITIN) tablet 10 mg  10 mg Oral DAILY    pantoprazole (PROTONIX) tablet 40 mg  40 mg Oral ACB    hyoscyamine SL (LEVSIN/SL) tablet 0.125 mg  0.125 mg SubLINGual Q4H PRN    latanoprost (XALATAN) 0.005 % ophthalmic solution 1 Drop  1 Drop Both Eyes QHS    timolol (TIMOPTIC) 0.5 % ophthalmic solution 1 Drop  1 Drop Both Eyes DAILY    guaiFENesin ER (MUCINEX) tablet 600 mg  600 mg Oral Q12H    albuterol-ipratropium (DUO-NEB) 2.5 MG-0.5 MG/3 ML  3 mL Nebulization Q4H PRN    bismuth subsalicylate (PEPTO-BISMOL) oral suspension 30 mL  30 mL Oral Q6H PRN    hydrocortisone-pramoxine (ANALPRAM-HC) 2.5-1 % (4g) cream   Rectal TID PRN       Review of Systems    Constitutional: negative for fever, chills, sweats  Cardiovascular: negative for chest pain, palpitations, syncope, edema  Gastrointestinal: negative for dysphagia, reflux, vomiting, diarrhea, abdominal pain, or melena  Neurologic: negative for focal weakness, numbness, headache    Objective:     Vitals:    10/28/20 1927 10/28/20 2255 10/29/20 0316 10/29/20 0805   BP: (!) 114/57 98/66 (!) 92/52 110/77   Pulse: (!) 105 100 89 92   Resp: 18 18 18 20   Temp: 98.1 °F (36.7 °C) 98 °F (36.7 °C) 98.2 °F (36.8 °C) 97.7 °F (36.5 °C)   SpO2: 97% 98% 93% 97%   Weight:       Height:         Intake and Output:   10/27 1901 - 10/29 0700  In: 480 [P.O.:480]  Out: 1450 [Urine:1450]  10/29 0701 - 10/29 1900  In: 600 [P.O.:600]  Out: -     Physical Exam:   Constitution:  the patient is well developed and in no acute distress  EENMT:  Sclera clear, pupils equal, oral mucosa moist  Respiratory: Mildly decreased at right base  Cardiovascular:  RRR without M,G,R  Gastrointestinal: soft and non-tender; with positive bowel sounds. Musculoskeletal: warm without cyanosis. There is no lower leg edema. Skin:  no jaundice or rashes, no wounds   Neurologic: no gross neuro deficits     Psychiatric:  alert and oriented x ppt    CHEST XRAY:   CXR Results  (Last 48 hours)    None        CT Chest 10/26  IMPRESSION:  1. Large right pleural effusion with collapse of the right lower lobe and  majority of the right middle lobe and mass effect on the mediastinum and  inferior displacement of the diaphragm. . No pericardial fluid. 2. No obstructing right hilar mass. Reimaging can performed after fluid removal  for further evaluation. 3. No pulmonary embolism.     LAB  No lab exists for component: [address] 10/29/20  0549 10/28/20  0533 10/27/20  0440 10/26/20  1208   WBC 5.5 7.6 9.6 10.3   HGB 7.5* 8.2* 10.3* 12.8   HCT 22.7* 24.3* 31.0* 38.4    162 186 217     Recent Labs     10/29/20  0549 10/28/20  0533 10/27/20  0440 10/26/20  1208    140 137 139   K 3.7 3.7 4.2 3.5   * 110* 106 105   CO2 29 27 24 27   GLU 94 96 123* 113*   BUN 6* 7* 9 8   CREA 0.56* 0.56* 0.60 0.83   CA 7.8* 7.7* 8.1* 9.2   ALB  --   --   --  3.5     ABG:  No results found for: PH, PHI, PCO2, PCO2I, PO2, PO2I, HCO3, HCO3I, FIO2, FIO2I      MICRO    SARS-CoV-2 LAB Results  [facility] Test: No results found for: COV2NT   [facility] Test: No results found for: EDPR  [facility] Test: No components found for: [id number]           Assessment:  (Medical Decision Making)     Hospital Problems  Date Reviewed: 1/21/2020          Codes Class Noted POA    Hypoxemia ICD-10-CM: R09.02  ICD-9-CM: 799.02  10/27/2020 Unknown        * (Principal) Pleural effusion ICD-10-CM: J90  ICD-9-CM: 511.9  10/26/2020 Unknown        Gastroesophageal reflux disease without esophagitis ICD-10-CM: K21.9  ICD-9-CM: 530.81  9/22/2017 Yes        Irritable bowel syndrome with diarrhea ICD-10-CM: K58.0  ICD-9-CM: 564.1  9/16/2016 Yes              Patient with very large, bloody right sided pleural effusion with no preceding trauma to that side. Very concerning for malignancy. Plan:  (Medical Decision Making)   -will need to follow up pleural fluid cytology closely as malignancy is high on the differential.   -would try to repeat CXR today.  Computers are down which may make this difficult but would like to ensure this fluid is not rapidly reaccumulating to a point that it needs another procedure now.   -if this all looks good would seem to be ok for discharge from pulmonary standpoint with outpatient follow up in 1-2 weeks.   -if she is otherwise doing       Nate Richardson MD

## 2020-10-29 NOTE — PROGRESS NOTES
Problem: Falls - Risk of  Goal: *Absence of Falls  Description: Document Roya Lucero Fall Risk and appropriate interventions in the flowsheet.   Outcome: Progressing Towards Goal  Note: Fall Risk Interventions:  Mobility Interventions: Communicate number of staff needed for ambulation/transfer, Patient to call before getting OOB         Medication Interventions: Assess postural VS orthostatic hypotension, Patient to call before getting OOB, Teach patient to arise slowly    Elimination Interventions: Call light in reach, Patient to call for help with toileting needs              Problem: Patient Education: Go to Patient Education Activity  Goal: Patient/Family Education  Outcome: Progressing Towards Goal

## 2020-10-29 NOTE — PROGRESS NOTES
Care Management Interventions  PCP Verified by CM: Yes  Palliative Care Criteria Met (RRAT>21 & CHF Dx)?: No  Transition of Care Consult (CM Consult): Discharge Planning  Discharge Durable Medical Equipment: No(hospital bed, walker(belonged to her ))  Physical Therapy Consult: No  Occupational Therapy Consult: No  Speech Therapy Consult: No  Current Support Network: Lives Alone  Confirm Follow Up Transport: Self  Discharge Location  Discharge Placement: Home  Meet with Hospitalist's today and pt may d./c home today after ECHO results return. Pulmonary saw and said will follow pt outpatient.

## 2020-10-30 VITALS
RESPIRATION RATE: 20 BRPM | BODY MASS INDEX: 24.65 KG/M2 | SYSTOLIC BLOOD PRESSURE: 130 MMHG | OXYGEN SATURATION: 99 % | DIASTOLIC BLOOD PRESSURE: 72 MMHG | HEART RATE: 93 BPM | TEMPERATURE: 97.5 F | HEIGHT: 64 IN | WEIGHT: 144.4 LBS

## 2020-10-30 LAB
ANION GAP SERPL CALC-SCNC: 2 MMOL/L (ref 7–16)
BASOPHILS # BLD: 0 K/UL (ref 0–0.2)
BASOPHILS NFR BLD: 1 % (ref 0–2)
BUN SERPL-MCNC: 6 MG/DL (ref 8–23)
CALCIUM SERPL-MCNC: 8.2 MG/DL (ref 8.3–10.4)
CHLORIDE SERPL-SCNC: 112 MMOL/L (ref 98–107)
CO2 SERPL-SCNC: 29 MMOL/L (ref 21–32)
CREAT SERPL-MCNC: 0.58 MG/DL (ref 0.6–1)
DIFFERENTIAL METHOD BLD: ABNORMAL
EOSINOPHIL # BLD: 0.4 K/UL (ref 0–0.8)
EOSINOPHIL NFR BLD: 9 % (ref 0.5–7.8)
ERYTHROCYTE [DISTWIDTH] IN BLOOD BY AUTOMATED COUNT: 13.4 % (ref 11.9–14.6)
GLUCOSE SERPL-MCNC: 97 MG/DL (ref 65–100)
HCT VFR BLD AUTO: 23.8 % (ref 35.8–46.3)
HGB BLD-MCNC: 7.8 G/DL (ref 11.7–15.4)
IMM GRANULOCYTES # BLD AUTO: 0 K/UL (ref 0–0.5)
IMM GRANULOCYTES NFR BLD AUTO: 1 % (ref 0–5)
LYMPHOCYTES # BLD: 1.2 K/UL (ref 0.5–4.6)
LYMPHOCYTES NFR BLD: 26 % (ref 13–44)
MCH RBC QN AUTO: 32.1 PG (ref 26.1–32.9)
MCHC RBC AUTO-ENTMCNC: 32.8 G/DL (ref 31.4–35)
MCV RBC AUTO: 97.9 FL (ref 79.6–97.8)
MONOCYTES # BLD: 0.3 K/UL (ref 0.1–1.3)
MONOCYTES NFR BLD: 7 % (ref 4–12)
NEUTS SEG # BLD: 2.5 K/UL (ref 1.7–8.2)
NEUTS SEG NFR BLD: 57 % (ref 43–78)
NRBC # BLD: 0 K/UL (ref 0–0.2)
PLATELET # BLD AUTO: 192 K/UL (ref 150–450)
PMV BLD AUTO: 9.6 FL (ref 9.4–12.3)
POTASSIUM SERPL-SCNC: 3.6 MMOL/L (ref 3.5–5.1)
RBC # BLD AUTO: 2.43 M/UL (ref 4.05–5.2)
SODIUM SERPL-SCNC: 143 MMOL/L (ref 136–145)
WBC # BLD AUTO: 4.4 K/UL (ref 4.3–11.1)

## 2020-10-30 PROCEDURE — 94640 AIRWAY INHALATION TREATMENT: CPT

## 2020-10-30 PROCEDURE — 99232 SBSQ HOSP IP/OBS MODERATE 35: CPT | Performed by: INTERNAL MEDICINE

## 2020-10-30 PROCEDURE — 74011250637 HC RX REV CODE- 250/637: Performed by: INTERNAL MEDICINE

## 2020-10-30 PROCEDURE — 94760 N-INVAS EAR/PLS OXIMETRY 1: CPT

## 2020-10-30 PROCEDURE — 74011000250 HC RX REV CODE- 250: Performed by: INTERNAL MEDICINE

## 2020-10-30 PROCEDURE — 80048 BASIC METABOLIC PNL TOTAL CA: CPT

## 2020-10-30 PROCEDURE — 36415 COLL VENOUS BLD VENIPUNCTURE: CPT

## 2020-10-30 PROCEDURE — 85025 COMPLETE CBC W/AUTO DIFF WBC: CPT

## 2020-10-30 RX ORDER — BENZONATATE 200 MG/1
200 CAPSULE ORAL
Qty: 21 CAP | Refills: 0 | Status: SHIPPED | OUTPATIENT
Start: 2020-10-30 | End: 2020-11-06

## 2020-10-30 RX ORDER — FUROSEMIDE 20 MG/1
20 TABLET ORAL EVERY OTHER DAY
Qty: 15 TAB | Refills: 0 | Status: SHIPPED | OUTPATIENT
Start: 2020-10-30 | End: 2020-11-04 | Stop reason: SDUPTHER

## 2020-10-30 RX ORDER — ACETAMINOPHEN 325 MG/1
650 TABLET ORAL
Qty: 16 TAB | Refills: 0 | Status: SHIPPED | OUTPATIENT
Start: 2020-10-30 | End: 2022-04-28

## 2020-10-30 RX ORDER — CEFDINIR 300 MG/1
300 CAPSULE ORAL 2 TIMES DAILY
Qty: 8 CAP | Refills: 0 | Status: SHIPPED | OUTPATIENT
Start: 2020-10-30 | End: 2020-11-03

## 2020-10-30 RX ADMIN — Medication 10 ML: at 06:00

## 2020-10-30 RX ADMIN — IPRATROPIUM BROMIDE AND ALBUTEROL SULFATE 3 ML: .5; 3 SOLUTION RESPIRATORY (INHALATION) at 08:04

## 2020-10-30 RX ADMIN — TIMOLOL MALEATE 1 DROP: 5 SOLUTION OPHTHALMIC at 08:17

## 2020-10-30 RX ADMIN — DOXYCYCLINE HYCLATE 100 MG: 100 CAPSULE ORAL at 08:16

## 2020-10-30 RX ADMIN — PANTOPRAZOLE SODIUM 40 MG: 40 TABLET, DELAYED RELEASE ORAL at 08:16

## 2020-10-30 RX ADMIN — LORATADINE 10 MG: 10 TABLET ORAL at 08:16

## 2020-10-30 RX ADMIN — GUAIFENESIN 600 MG: 600 TABLET, EXTENDED RELEASE ORAL at 08:16

## 2020-10-30 NOTE — PROGRESS NOTES
Care Management Interventions  PCP Verified by CM: Yes  Palliative Care Criteria Met (RRAT>21 & CHF Dx)?: No  Mode of Transport at Discharge: Other (see comment)(daughter)  Transition of Care Consult (CM Consult): Discharge Planning  Discharge Durable Medical Equipment: No  Physical Therapy Consult: No  Occupational Therapy Consult: No  Speech Therapy Consult: No  Current Support Network: Lives Alone  Confirm Follow Up Transport: Self  Discharge Location  Discharge Placement: Home  Followed up with patient who is medically ready for d/c. Patient states she is ready for d/c today. She was asking for information on assistance at home for house cleaning etc. CM provided her with brochures on home care options [facility] and [facility] for possible assistance and discussed that these were private pay insurance does not cover. She voiced understanding of information. Declined any need for home health states she has been getting up in the room without difficulty and if she needs assistance her daughter can help as needed. She voices no concerns or needs for d/c. Patient to d/c home and daughter will pick her up and transport home.

## 2020-10-30 NOTE — PROGRESS NOTES
Problem: Falls - Risk of  Goal: *Absence of Falls  Description: Document Elzbieta Sultana Fall Risk and appropriate interventions in the flowsheet.   10/30/2020 1313 by Nga Soto RN  Outcome: Resolved/Met  10/30/2020 0801 by Nga Soto, RN  Outcome: Progressing Towards Goal  Note: Fall Risk Interventions:  Mobility Interventions: Communicate number of staff needed for ambulation/transfer         Medication Interventions: Teach patient to arise slowly    Elimination Interventions: Call light in reach              Problem: Patient Education: Go to Patient Education Activity  Goal: Patient/Family Education  Outcome: Resolved/Met

## 2020-10-30 NOTE — PROGRESS NOTES
[facility]  Admission Date: 10/26/2020             Daily Progress Note: 10/30/2020    The patient's chart is reviewed and the patient is discussed with the staff. Patient is X 89 y.o.  female seen and evaluated at the request of Dr. Bdu Apple for the evaluation of pleural effusion. Patient presented to Er with few daysd of progressive SOB, also cough with some yellow sputum. She is also very weak. It came on quite suddently. No history of weight loss, just no appetite last 3 days. No hemoptysis. No history of malignancy. S./p L thoracentesis 10/27- 2800 cc bloody fluid removed. 10/28 another 1800cc bloody from right.      Subjective:     Complains of cough and not feeling as well today     Current Facility-Administered Medications   Medication Dose Route Frequency    cefTRIAXone (ROCEPHIN) 1 g in 0.9% sodium chloride (MBP/ADV) 50 mL  1 g IntraVENous Q24H    doxycycline (VIBRAMYCIN) capsule 100 mg  100 mg Oral Q12H    HYDROcodone-acetaminophen (NORCO) 5-325 mg per tablet 1 Tab  1 Tab Oral Q6H PRN    sodium chloride (NS) flush 5-40 mL  5-40 mL IntraVENous Q8H    sodium chloride (NS) flush 5-40 mL  5-40 mL IntraVENous PRN    acetaminophen (TYLENOL) tablet 650 mg  650 mg Oral Q6H PRN    Or    acetaminophen (TYLENOL) suppository 650 mg  650 mg Rectal Q6H PRN    polyethylene glycol (MIRALAX) packet 17 g  17 g Oral DAILY PRN    promethazine (PHENERGAN) tablet 12.5 mg  12.5 mg Oral Q6H PRN    loratadine (CLARITIN) tablet 10 mg  10 mg Oral DAILY    pantoprazole (PROTONIX) tablet 40 mg  40 mg Oral ACB    hyoscyamine SL (LEVSIN/SL) tablet 0.125 mg  0.125 mg SubLINGual Q4H PRN    latanoprost (XALATAN) 0.005 % ophthalmic solution 1 Drop  1 Drop Both Eyes QHS    timolol (TIMOPTIC) 0.5 % ophthalmic solution 1 Drop  1 Drop Both Eyes DAILY    guaiFENesin ER (MUCINEX) tablet 600 mg  600 mg Oral Q12H    albuterol-ipratropium (DUO-NEB) 2.5 MG-0.5 MG/3 ML  3 mL Nebulization Q4H PRN    bismuth subsalicylate (PEPTO-BISMOL) oral suspension 30 mL  30 mL Oral Q6H PRN    hydrocortisone-pramoxine (ANALPRAM-HC) 2.5-1 % (4g) cream   Rectal TID PRN       Review of Systems    Constitutional: negative for fever, chills, sweats  Cardiovascular: negative for chest pain, palpitations, syncope, edema  Gastrointestinal: negative for dysphagia, reflux, vomiting, diarrhea, abdominal pain, or melena  Neurologic: negative for focal weakness, numbness, headache    Objective:     Vitals:    10/29/20 2311 10/30/20 0310 10/30/20 0801 10/30/20 0808   BP: (!) 107/58 98/62 124/62    Pulse: 83 78 89    Resp: 18 18 20    Temp: 98.1 °F (36.7 °C) 97.6 °F (36.4 °C) 97.4 °F (36.3 °C)    SpO2: 96% 95% 96% 98%   Weight:  144 lb 6.4 oz (65.5 kg)     Height:         Intake and Output:   10/28 1901 - 10/30 0700  In: 1240 [P.O.:1240]  Out: 2500 [Urine:2500]  No intake/output data recorded. Physical Exam:   Constitution:  the patient is well developed and in no acute distress  EENMT:  Sclera clear, pupils equal, oral mucosa moist  Respiratory: Mildly decreased at right base  Cardiovascular:  RRR without M,G,R  Gastrointestinal: soft and non-tender; with positive bowel sounds. Musculoskeletal: warm without cyanosis. There is no lower leg edema. Skin:  no jaundice or rashes, no wounds   Neurologic: no gross neuro deficits     Psychiatric:  alert and oriented x ppt    CHEST XRAY:   CXR Results  (Last 48 hours)               10/29/20 1250  XR CHEST SNGL V Final result    Impression:  IMPRESSION:   1. Improved however persistent small right pleural effusion. 2. Small right basilar heterogeneous air space opacity, possible atelectasis or   pneumonia with bibasilar discoid atelectasis evident. Narrative:  EXAM: Single view chest radiograph. INDICATION: Right-sided pain with inspiration, pneumonia. COMPARISON: Chest radiograph dated October 26, 2020.        FINDINGS:   Interval decrease in size of a right pleural effusion, now small with a mild   heterogeneous right basilar airspace opacity. There is discoid atelectasis of   the bilateral lung bases. Heart appears normal in size. No evidence of acute   osseous abnormality. CT Chest 10/26  IMPRESSION:  1. Large right pleural effusion with collapse of the right lower lobe and  majority of the right middle lobe and mass effect on the mediastinum and  inferior displacement of the diaphragm. . No pericardial fluid. 2. No obstructing right hilar mass. Reimaging can performed after fluid removal  for further evaluation. 3. No pulmonary embolism. LAB  No lab exists for component: [place]   Recent Labs     10/30/20  0513 10/29/20  0549 10/28/20  0533   WBC 4.4 5.5 7.6   HGB 7.8* 7.5* 8.2*   HCT 23.8* 22.7* 24.3*    162 162     Recent Labs     10/30/20  0513 10/29/20  0549 10/28/20  0533    141 140   K 3.6 3.7 3.7   * 110* 110*   CO2 29 29 27   GLU 97 94 96   BUN 6* 6* 7*   CREA 0.58* 0.56* 0.56*   CA 8.2* 7.8* 7.7*     ABG:  No results found for: PH, PHI, PCO2, PCO2I, PO2, PO2I, HCO3, HCO3I, FIO2, FIO2I      MICRO    SARS-CoV-2 LAB Results  [facility] Test: No results found for: COV2NT   [facility] Test: No results found for: EDPR  [facility] Test: No components found for: [id number]           Assessment:  (Medical Decision Making)     Hospital Problems  Date Reviewed: 1/21/2020          Codes Class Noted POA    Hypoxemia ICD-10-CM: R09.02  ICD-9-CM: 799.02  10/27/2020 Unknown        * (Principal) Pleural effusion ICD-10-CM: J90  ICD-9-CM: 511.9  10/26/2020 Unknown        Gastroesophageal reflux disease without esophagitis ICD-10-CM: K21.9  ICD-9-CM: 530.81  9/22/2017 Yes        Irritable bowel syndrome with diarrhea ICD-10-CM: K58.0  ICD-9-CM: 564.1  9/16/2016 Yes              Patient with very large, bloody right sided pleural effusion with no preceding trauma to that side. Very concerning for malignancy.      Plan:  (Medical Decision Making)       - on RA, stable for discharge ,will follow up in 10 days with CXR in our office  Robert Dangelo MD

## 2020-10-30 NOTE — DISCHARGE INSTRUCTIONS
If worsening shortness of breath, cough, return to ED    Follow with PMD, pulmonary, oncology     Cardiology referral outpatient for EF of 40 to 45 %    Blood work 11/03/2020 (ISABELA)    Chest XR 11/05/2020

## 2020-10-30 NOTE — PROGRESS NOTES
10/30/20 0808   Oxygen Therapy   O2 Sat (%) 98 %   Pulse via Oximetry 105 beats per minute   O2 Device Room air   Pre-Treatment   Breathing Pattern Regular   Cough Non-productive   Breath Sounds Bilateral Upper;Clear; Lower;Crackles   Pulse 105   SpO2 98 %   Respirations 16   Post-Treatment   Breathing Pattern Regular   Cough Non-productive   Breath Sounds Bilateral Upper;Clear; Lower;Crackles   Pulse 102   SpO2 100 %   Respirations 16   Treatment Tolerance Well   Procedures   $$ Subsequent Procedure Aerosol   Delivery Source Mouthpiece

## 2020-10-30 NOTE — DISCHARGE SUMMARY
Date of Admission: 10/26/2020  Date of Discharge: 10/30/2020    Discharge Diagnoses:  1. Acute hypoxia in the setting of pleural effusion concerning of CAP and underlying malignancy    Discharge Medications:  Discharge Medication List as of 10/30/2020  2:15 PM      START taking these medications    Details   acetaminophen (TYLENOL) 325 mg tablet Take 2 Tabs by mouth every six (6) hours as needed for Pain., Normal, Disp-16 Tab,R-0      cefdinir (OMNICEF) 300 mg capsule Take 1 Cap by mouth two (2) times a day for 4 days. , Normal, Disp-8 Cap,R-0      furosemide (LASIX) 20 mg tablet Take 1 Tab by mouth every other day., Normal, Disp-15 Tab,R-0      benzonatate (TESSALON) 200 mg capsule Take 1 Cap by mouth three (3) times daily as needed for Cough for up to 7 days. , Normal, Disp-21 Cap,R-0      Lactobacillus acidophilus (BACID) cap Take 2 Caps by mouth two (2) times a day., Normal, Disp-8 Cap,R-0         CONTINUE these medications which have NOT CHANGED    Details   hyoscyamine SL (LEVSIN/SL) 0.125 mg SL tablet 1 Tab by SubLINGual route every four (4) hours as needed for Cramping. Indications: irritable colon, Print, Disp-90 Tab, R-3      hydrocortisone-pramoxine (ANALPRAM-HC) 2.5-1 % rectal cream Insert  into rectum three (3) times daily. , Print, Disp-30 g, R-4      bi-est/progest 1.5/100 (BI-EST/PROGEST 1.5/100) compound Apply  to affected area. COMPOUND= Bi- est/progest 1.5/ 100 Cream, Historical Med      timolol (TIMOPTIC) 0.5 % ophthalmic solution PLACE 1 DROP IN BOTH EYES IN THE MORNING, Historical Med, R-12      triamcinolone (NASACORT AQ) 55 mcg nasal inhaler 2 Sprays daily. , Historical Med      estradiol (ESTRACE) 0.01 % (0.1 mg/gram) vaginal cream Use 2 grams nightly daily, Disp-42.5 g, R-4, Print      cetirizine (ZYRTEC) 10 mg tablet Take  by mouth daily. , Historical Med      esomeprazole (NEXIUM) 20 mg capsule Take 40 mg by mouth every morning., Historical Med      latanoprost (XALATAN) 0.005 % ophthalmic solution Administer 1 Drop to both eyes nightly., Historical Med              Pending Labs:  None    Follow-up (including scheduled tests): Follow-up Information     Follow up With Specialties Details Why Contact Info    Maria Antonia Juarez MD Internal Medicine   [address] [facility]  [address] [facility]  [phone]      Cassius Andrews MD Pulmonary Disease   [address] [facility]  [address] [facility]  [phone]      Darnell Marrero MD Hematology, Oncology   [facility] [address]      Cassius Andrews MD Pulmonary Disease  will call with appt. [address] [facility] [address] [phone]      Donel Comp, NP Nurse Practitioner  11/23/20 chest xray @2:00 p.m. and office visit at 3:00 p.m. [place] [address]  [address] [place] [address].  [phone]             History of Present Illness:  79 y.o. female who came to ER due to coughing, shortness of breath for the past 3 days. She has had some cough and shortness of breath for the past week. She thought it was because she was going through her stuff in garage. She started taking Zithromax yesterday. Her shortness of breath and coughing got worse and so she decided to come to ER. In ER, she was found to have oxygen saturation of 87% in room air. She was improved with oxygen cannula. CT shows pleural effusion on the right chest with possible mass effect. Hospitalist service is requested to admit the patient.       Past Medical History:  Past Medical History:   Diagnosis Date    Arrhythmia     while in ER a week ago-irregular/ Regular rythm today    Arthritis     osteo-left hand    Autoimmune disease ([address]     fibromyalgia    Compression fracture of T12 vertebra ([address] 8/1/2014    Endometriosis     hyst    Fibrocystic breast     Fibromyalgia     GERD (gastroesophageal reflux disease)     nexium daily    Glaucoma     Followed by Dr. Latasha Gonzales Headache     rare (not migraines)    IBS (irritable bowel syndrome)     bentyl    Ovarian cyst     hx of-bilateral S&O    Rectal itching 3/24/2017    UTI (urinary tract infection)     hx of; last one 2-3 years ago    Vaginitis, atrophic     asymptomatic at present       Allergies: Allergies   Allergen Reactions    Seasonale [Levonorgestrel-Ethinyl Estrad] Runny Nose and Sneezing     Patient denies ever taking medication    Ciprofibrate Diarrhea    Penicillins Rash       Hospital Course:  79 y.o. female who came to ER due to coughing, shortness of breath for the past 3 days.     1.  Acute hypoxia in the setting of pleural effusion concerning of CAP and underlying malignancy   - Placed on O2 therapy to maintain O2 Sat>92% and able to wean off  - Started levofloxacin and discontinued due to side effects   - Started ceftriaxone and doxycycline  - Pulm consulted    - S/p thoracentesis - cytology (malignant cells, pending immunohistochemical stain) and cultures (NGTD)  - CT neck, chest and abdomen without clear primary malignancy   - SARS CoV 2 PCR negative  - TTE with EF 40 to 45%, started lasix and will need outpatient referral for cardiology   - Improved symptomatology and hemodynamically stable on discharge  - No O2 requirements on discharge  - Discharged on cefdinir    - Oncology outpatient referral to follow pleural fluid with malignant cells      Procedures:  Thoracentesis     Discharge Day Information:  Follow with PMD    Discharge Physical Exam:  General: Alert, oriented, NAD  HEENT: NC/AT, EOM are intact  Neck: supple, no JVD  Cardiovascular: RRR, S1, S2, no murmurs  Respiratory: Lungs are clear, no wheezes or rales  Abdomen: Soft, NT, ND  Back: No CVA tenderness, no paraspinal tenderness  Extremities: LE without pedal edema, no erythema  Neuro: A&O, CN are intact, no focal deficits  Skin: no rash or ulcers  Psych: good mood and affect    Condition: Improved    Disposition: Improved    Consultants During This Hospitalization: Pulm, Onc referral for outpatient appointment

## 2020-10-30 NOTE — PROGRESS NOTES
Problem: Falls - Risk of  Goal: *Absence of Falls  Description: Document Sherlyduncan Almaguer Fall Risk and appropriate interventions in the flowsheet.   Outcome: Progressing Towards Goal  Note: Fall Risk Interventions:  Mobility Interventions: Communicate number of staff needed for ambulation/transfer         Medication Interventions: Teach patient to arise slowly    Elimination Interventions: Call light in reach

## 2020-10-30 NOTE — ACP (ADVANCE CARE PLANNING)
Advance Care Planning     Advance Care Planning Activator (Inpatient)  Conversation Note      Date of ACP Conversation: 10/30/20     Conversation Conducted with:   Patient Don Arce    Met with patient for ACP discussion. Patient states that she has a HPOA and living will. Encouraged her to bring copy for us to put on file and she verbalized understanding. ACP Activator: Dominick Bunn RN    *When Decision Maker makes decisions on behalf of the incapacitated patient: Decision Maker is asked to consider and make decisions based on patient values, known preferences, or best interests. Health Care Decision Maker:    Current Designated Health Care Decision Maker:   (If there is a valid [place] named in the [address] [place] M[employer]\" box in the ACP activity, but it is not visible above, be sure to open that field and then select the health care decision maker relationship (ie \"primary\") in the blank space to the right of the name.) Validate  this information as still accurate & up-to-date; edit [place] field as needed.)    Note: Assess and validate information in current ACP documents, as indicated. If no Decision Maker listed above or available through scanned documents, then:    If no Authorized Decision Maker has previously been identified, then patient chooses [place]:  \"Who would you like to name as your primary health care decision-maker? \"    Name: David Joyner Relationship: Daughter Phone number: [phone]  [facility] [place] this person be reached easily? \" YES  \"Who would you like to name as your back-up decision maker? \"   Name: Zahida King  Relationship: Son Phone number: [phone]  [facility] [place] this person be reached easily? \" YES    Note: If the relationship of these Decision-Makers to the patient does NOT follow your state's Next of Kin hierarchy, recommend that patient complete ACP document that meets state-specific requirements to allow them to act on the patient's behalf when appropriate. Care Preferences    Ventilation: \"If you were in your present state of health and suddenly became very ill and were unable to breathe on your own, what would your preference be about the use of a ventilator (breathing machine) if it were available to you? \"      If patient would desire the use of a ventilator (breathing machine), answer \"yes\", if not \"no\":no    \"If your health worsens and it becomes clear that your chance of recovery is unlikely, what would your preference be about the use of a ventilator (breathing machine) if it were available to you? \"     Would the patient desire the use of a ventilator (breathing machine)? NO      Resuscitation  \"CPR works best to restart the heart when there is a sudden event, like a heart attack, in someone who is otherwise healthy. Unfortunately, CPR does not typically restart the heart for people who have serious health conditions or who are very sick. \"    \"In the event your heart stopped as a result of an underlying serious health condition, would you want attempts to be made to restart your heart (answer \"yes\" for attempt to resuscitate) or would you prefer a natural death (answer \"no\" for do not attempt to resuscitate)? \" no      NOTE: If the patient has a valid advance directive AND now provides care preference(s) that are inconsistent with that prior directive, advise the patient to consider either: creating a new advance directive that complies with state-specific requirements; or, if that is not possible, orally revoking that prior directive in accordance with state-specific requirements, which must be documented in the EHR. [x] Yes  [] No   Educated Patient / Filipe Jackson regarding differences between Advance Directives and portable DNR orders.     Length of ACP Conversation in minutes:      Conversation Outcomes:  [x] ACP discussion completed  [] Existing advance directive reviewed with patient; no changes to patient's previously recorded wishes     [] New Advance Directive completed   [] Portable Do Not Resuscitate prepared for Provider review and signature  [] POLST/POST/MOLST/MOST prepared for Provider review and signature      Follow-up plan:    [] Schedule follow-up conversation to continue planning  [] Referred individual to Provider for additional questions/concerns   [] Advised patient/agent/surrogate to review completed ACP document and update if needed with changes in condition, patient preferences or care setting     [] This note routed to one or more involved healthcare providers      These are patient's current wishes as discussed at bedside today and are not intended to take place of [address]

## 2020-11-01 LAB
BACTERIA SPEC CULT: ABNORMAL
GRAM STN SPEC: ABNORMAL
GRAM STN SPEC: ABNORMAL
SERVICE CMNT-IMP: ABNORMAL

## 2020-11-02 ENCOUNTER — PATIENT OUTREACH (OUTPATIENT)
Dept: CASE MANAGEMENT | Age: 80
End: 2020-11-02

## 2020-11-02 PROBLEM — D64.9 SEVERE ANEMIA: Status: ACTIVE | Noted: 2020-11-02

## 2020-11-02 PROBLEM — Z63.79 STRESS DUE TO SPOUSE WITH DEMENTIA: Status: RESOLVED | Noted: 2019-04-02 | Resolved: 2020-11-02

## 2020-11-02 NOTE — PROGRESS NOTES
Initial MALENA outreach attempt to was unsuccessful. Left message. Will attempt second outreach within 24 hours.

## 2020-11-02 NOTE — PROGRESS NOTES
Second MALENA outreach attempt was unsuccessful. Left message to return call. Will attempt third outreach within 5 business days.

## 2020-11-03 ENCOUNTER — PATIENT OUTREACH (OUTPATIENT)
Dept: CASE MANAGEMENT | Age: 80
End: 2020-11-03

## 2020-11-03 NOTE — PROGRESS NOTES
Patient was admitted to [facility] on 10/26/20 and discharged on 10/30/20 for Pleural effusion. Outreach made within 2 business days of discharge: Yes    Top Discharge Challenges to be reviewed by the provider   Additional needs identified to be addressed with provider no  none  Discussed COVID-19 related testing which was available at this time. Test results were negative. Patient informed of results, if available? yes   Method of communication with provider : none       Advance Care Planning:   Does patient have an Advance Directive:  addressed by IP CM    Inpatient Readmission Risk score: 12  Was this a readmission? no   Patient stated reason for the admission: n/a  Patients top risk factors for readmission: comorbidities  Interventions to address risk factors: n/a    LPN Care Coordinator contacted the patient by telephone to perform post hospital discharge assessment. Verified name and  with patient as identifiers. Provided introduction to self, and explanation of the CTN role. CC reviewed discharge instructions, medical action plan and red flags with patient who verbalized understanding. Patient given an opportunity to ask questions and does not have any further questions or concerns at this time. The patient agrees to contact the PCP office for questions related to their healthcare. Medication reconciliation was declined by patient, who verbalizes understanding of administration of home medications. Advised obtaining a 90-day supply of all daily and as-needed medications. Referral to Pharm D needed: no     Home Health/Outpatient orders at discharge: [address]: n/a  Date of initial visit: [facility] ordered at discharge: none  [address] received: n/a    Covid Risk Education    Patient has following risk factors of: pleural effusion.  Education provided regarding infection prevention, and signs and symptoms of COVID-19 and when to seek medical attention with patient who verbalized understanding. Discussed exposure protocols and quarantine From CDC: Are you at higher risk for severe illness?  and given an opportunity for questions and concerns. The patient agrees to contact the COVID-19 hotline [phone] or PCP office for questions related to COVID-19. For more information on steps you can take to protect yourself, see CDC's How to Protect Yourself     Patient/family/caregiver given information for Get[employer] and agrees to enroll no       Discussed follow-up appointments. If no appointment was previously scheduled, appointment scheduling offered: yes  [facility] follow up appointment(s):   Future Appointments   Date Time Provider Tali Smith   11/6/2020 11:45 AM Darnell Marrero MD [address]   11/20/2020  1:30 PM Jorge Blankenship MD [facility] UCD   11/23/2020  3:20 PM Marjorie Murry NP [facility] PP PP   2/5/2021 10:40 AM [facility]     [facility] follow up appointment(s): n/a    Patient was not receptive to further outreach. CC provided contact information for future needs.

## 2020-11-06 ENCOUNTER — HOSPITAL ENCOUNTER (OUTPATIENT)
Dept: LAB | Age: 80
Discharge: HOME | End: 2020-11-06
Payer: MEDICARE

## 2020-11-06 ENCOUNTER — PATIENT OUTREACH (OUTPATIENT)
Dept: CASE MANAGEMENT | Age: 80
End: 2020-11-06

## 2020-11-06 DIAGNOSIS — D64.9 LOW HEMOGLOBIN: ICD-10-CM

## 2020-11-06 DIAGNOSIS — R68.89 OTHER GENERAL SYMPTOMS AND SIGNS: ICD-10-CM

## 2020-11-06 DIAGNOSIS — R59.9 LYMPH NODES ENLARGED: ICD-10-CM

## 2020-11-06 DIAGNOSIS — J90 PLEURAL EFFUSION: ICD-10-CM

## 2020-11-06 LAB
ALBUMIN SERPL-MCNC: 3.5 G/DL (ref 3.2–4.6)
ALBUMIN/GLOB SERPL: 1.3 {RATIO} (ref 1.2–3.5)
ALP SERPL-CCNC: 64 U/L (ref 50–136)
ALT SERPL-CCNC: 26 U/L (ref 12–65)
ANION GAP SERPL CALC-SCNC: 5 MMOL/L (ref 7–16)
AST SERPL-CCNC: 21 U/L (ref 15–37)
BASOPHILS # BLD: 0 K/UL (ref 0–0.2)
BASOPHILS NFR BLD: 1 % (ref 0–2)
BILIRUB SERPL-MCNC: 0.4 MG/DL (ref 0.2–1.1)
BUN SERPL-MCNC: 10 MG/DL (ref 8–23)
CALCIUM SERPL-MCNC: 9.1 MG/DL (ref 8.3–10.4)
CHLORIDE SERPL-SCNC: 105 MMOL/L (ref 98–107)
CO2 SERPL-SCNC: 29 MMOL/L (ref 21–32)
CREAT SERPL-MCNC: 0.7 MG/DL (ref 0.6–1)
DIFFERENTIAL METHOD BLD: ABNORMAL
EOSINOPHIL # BLD: 0.3 K/UL (ref 0–0.8)
EOSINOPHIL NFR BLD: 5 % (ref 0.5–7.8)
ERYTHROCYTE [DISTWIDTH] IN BLOOD BY AUTOMATED COUNT: 13.7 % (ref 11.9–14.6)
FERRITIN SERPL-MCNC: 41 NG/ML (ref 8–388)
FOLATE SERPL-MCNC: 49.2 NG/ML (ref 3.1–17.5)
GLOBULIN SER CALC-MCNC: 2.8 G/DL (ref 2.3–3.5)
GLUCOSE SERPL-MCNC: 96 MG/DL (ref 65–100)
HCT VFR BLD AUTO: 31.2 % (ref 35.8–46.3)
HGB BLD-MCNC: 10.3 G/DL (ref 11.7–15.4)
HGB RETIC QN AUTO: 35 PG (ref 29–35)
IMM GRANULOCYTES # BLD AUTO: 0.1 K/UL (ref 0–0.5)
IMM GRANULOCYTES NFR BLD AUTO: 1 % (ref 0–5)
IMM RETICS NFR: 21.7 % (ref 3–15.9)
IRON SATN MFR SERPL: 14 %
IRON SERPL-MCNC: 48 UG/DL (ref 35–150)
LYMPHOCYTES # BLD: 1.4 K/UL (ref 0.5–4.6)
LYMPHOCYTES NFR BLD: 21 % (ref 13–44)
MCH RBC QN AUTO: 32.1 PG (ref 26.1–32.9)
MCHC RBC AUTO-ENTMCNC: 33 G/DL (ref 31.4–35)
MCV RBC AUTO: 97.2 FL (ref 79.6–97.8)
MONOCYTES # BLD: 0.6 K/UL (ref 0.1–1.3)
MONOCYTES NFR BLD: 9 % (ref 4–12)
NEUTS SEG # BLD: 4.1 K/UL (ref 1.7–8.2)
NEUTS SEG NFR BLD: 63 % (ref 43–78)
NRBC # BLD: 0 K/UL (ref 0–0.2)
PLATELET # BLD AUTO: 292 K/UL (ref 150–450)
PMV BLD AUTO: 9 FL (ref 9.4–12.3)
POTASSIUM SERPL-SCNC: 4.1 MMOL/L (ref 3.5–5.1)
PROT SERPL-MCNC: 6.3 G/DL (ref 6.3–8.2)
RBC # BLD AUTO: 3.21 M/UL (ref 4.05–5.25)
RETICS # AUTO: 0.12 M/UL (ref 0.03–0.1)
RETICS/RBC NFR AUTO: 3.9 % (ref 0.3–2)
SODIUM SERPL-SCNC: 139 MMOL/L (ref 136–145)
T4 FREE SERPL-MCNC: 0.9 NG/DL (ref 0.78–1.4)
TIBC SERPL-MCNC: 349 UG/DL (ref 250–450)
TSH SERPL DL<=0.005 MIU/L-ACNC: 2.52 UIU/ML (ref 0.36–3)
VIT B12 SERPL-MCNC: 1326 PG/ML (ref 193–986)
WBC # BLD AUTO: 6.5 K/UL (ref 4.3–11.1)

## 2020-11-06 PROCEDURE — 80053 COMPREHEN METABOLIC PANEL: CPT

## 2020-11-06 PROCEDURE — 82746 ASSAY OF FOLIC ACID SERUM: CPT

## 2020-11-06 PROCEDURE — 84439 ASSAY OF FREE THYROXINE: CPT

## 2020-11-06 PROCEDURE — 85046 RETICYTE/HGB CONCENTRATE: CPT

## 2020-11-06 PROCEDURE — 83540 ASSAY OF IRON: CPT

## 2020-11-06 PROCEDURE — 82728 ASSAY OF FERRITIN: CPT

## 2020-11-06 PROCEDURE — 84443 ASSAY THYROID STIM HORMONE: CPT

## 2020-11-06 PROCEDURE — 82607 VITAMIN B-12: CPT

## 2020-11-06 PROCEDURE — 85025 COMPLETE CBC W/AUTO DIFF WBC: CPT

## 2020-11-06 PROCEDURE — 36415 COLL VENOUS BLD VENIPUNCTURE: CPT

## 2020-11-18 ENCOUNTER — HOSPITAL ENCOUNTER (OUTPATIENT)
Dept: PET IMAGING | Age: 80
Discharge: HOME | End: 2020-11-18
Attending: INTERNAL MEDICINE
Payer: MEDICARE

## 2020-11-18 DIAGNOSIS — J90 PLEURAL EFFUSION: ICD-10-CM

## 2020-11-18 DIAGNOSIS — R91.8 OTHER NONSPECIFIC ABNORMAL FINDING OF LUNG FIELD: ICD-10-CM

## 2020-11-18 DIAGNOSIS — R59.9 LYMPH NODES ENLARGED: ICD-10-CM

## 2020-11-18 PROCEDURE — A9552 F18 FDG: HCPCS

## 2020-11-18 PROCEDURE — 74011000636 HC RX REV CODE- 636: Performed by: INTERNAL MEDICINE

## 2020-11-18 RX ORDER — SODIUM CHLORIDE 0.9 % (FLUSH) 0.9 %
10 SYRINGE (ML) INJECTION
Status: COMPLETED | OUTPATIENT
Start: 2020-11-18 | End: 2020-11-18

## 2020-11-18 RX ADMIN — Medication 10 ML: at 10:35

## 2020-11-18 RX ADMIN — DIATRIZOATE MEGLUMINE AND DIATRIZOATE SODIUM 10 ML: 660; 100 LIQUID ORAL; RECTAL at 10:35

## 2020-11-19 ENCOUNTER — HOSPITAL ENCOUNTER (OUTPATIENT)
Dept: MRI IMAGING | Age: 80
Discharge: HOME | End: 2020-11-19
Attending: INTERNAL MEDICINE
Payer: MEDICARE

## 2020-11-19 DIAGNOSIS — R59.9 LYMPH NODES ENLARGED: ICD-10-CM

## 2020-11-19 DIAGNOSIS — J90 PLEURAL EFFUSION: ICD-10-CM

## 2020-11-19 PROCEDURE — 70553 MRI BRAIN STEM W/O & W/DYE: CPT

## 2020-11-19 PROCEDURE — A9575 INJ GADOTERATE MEGLUMI 0.1ML: HCPCS | Performed by: INTERNAL MEDICINE

## 2020-11-19 PROCEDURE — 74011250636 HC RX REV CODE- 250/636: Performed by: INTERNAL MEDICINE

## 2020-11-19 RX ORDER — SODIUM CHLORIDE 0.9 % (FLUSH) 0.9 %
10 SYRINGE (ML) INJECTION
Status: COMPLETED | OUTPATIENT
Start: 2020-11-19 | End: 2020-11-19

## 2020-11-19 RX ORDER — GADOTERATE MEGLUMINE 376.9 MG/ML
12 INJECTION INTRAVENOUS
Status: COMPLETED | OUTPATIENT
Start: 2020-11-19 | End: 2020-11-19

## 2020-11-19 RX ADMIN — Medication 10 ML: at 11:43

## 2020-11-19 RX ADMIN — GADOTERATE MEGLUMINE 12 ML: 376.9 INJECTION INTRAVENOUS at 11:43

## 2020-11-20 PROBLEM — I50.22 SYSTOLIC CHF, CHRONIC (HCC): Status: ACTIVE | Noted: 2020-11-20

## 2020-11-20 PROBLEM — C34.91 MALIGNANT NEOPLASM OF RIGHT LUNG (HCC): Status: ACTIVE | Noted: 2020-11-20

## 2020-11-23 ENCOUNTER — HOSPITAL ENCOUNTER (OUTPATIENT)
Dept: GENERAL RADIOLOGY | Age: 80
Discharge: HOME | End: 2020-11-23
Payer: MEDICARE

## 2020-11-23 DIAGNOSIS — R09.02 HYPOXEMIA: ICD-10-CM

## 2020-11-23 LAB
Lab: NORMAL
REFERENCE LAB,REFLB: NORMAL
TEST DESCRIPTION:,ATST: NORMAL

## 2020-11-23 PROCEDURE — 71046 X-RAY EXAM CHEST 2 VIEWS: CPT

## 2020-11-24 ENCOUNTER — PATIENT OUTREACH (OUTPATIENT)
Dept: CASE MANAGEMENT | Age: 80
End: 2020-11-24

## 2020-11-24 ENCOUNTER — HOSPITAL ENCOUNTER (OUTPATIENT)
Dept: INFUSION THERAPY | Age: 80
Discharge: HOME | End: 2020-11-24
Payer: MEDICARE

## 2020-11-24 DIAGNOSIS — C34.91 MALIGNANT NEOPLASM OF RIGHT LUNG, UNSPECIFIED PART OF LUNG (HCC): Primary | ICD-10-CM

## 2020-11-24 PROCEDURE — 96372 THER/PROPH/DIAG INJ SC/IM: CPT

## 2020-11-24 PROCEDURE — 74011250636 HC RX REV CODE- 250/636: Performed by: INTERNAL MEDICINE

## 2020-11-24 RX ORDER — CYANOCOBALAMIN 1000 UG/ML
1000 INJECTION, SOLUTION INTRAMUSCULAR; SUBCUTANEOUS ONCE
Status: COMPLETED | OUTPATIENT
Start: 2020-11-24 | End: 2020-11-24

## 2020-11-24 RX ADMIN — CYANOCOBALAMIN 1000 MCG: 1000 INJECTION, SOLUTION INTRAMUSCULAR; SUBCUTANEOUS at 15:24

## 2020-11-24 NOTE — PROGRESS NOTES
11/24/20 saw pt today with Dr. Lan Torres for follow up lung cancer. PET shows lung cancer and malignant pleural effusion. MRI brain negative for cancer. Recommendation is chemo with carbo/alimta/keytruda or tecentriq. Potential side effects reviewed. Port education book reviewed with the pt. Will arrange - port, chemo education, and Vit B12 start today. 5 rx sent to pharmacy and discussed with pt. Plan to follow up 12/15 to start therapy. Provided opportunity to ask questions and all were discussed. My contact information was provided and I encouraged her to call with any concerns. Navigation will continue to follow.

## 2020-12-02 NOTE — PROGRESS NOTES
I spoke with Junjose rdevyn Kiser regarding her Medicare and [facility] insurance coverage, potential oral medication authorizations, enrollment in the [facility] ([facility]) and the [address] [address] [place] [address] ([address]), and assistance organization resource sheet. Next, I spoke with Jerod Kiser regarding her Medicare and [facility] coverage. The patient was given the [employer] sheet which displayed her insurance benefits. Next, I spoke with Jerod Kiser regarding the Oncology Care Model Notification Letter. I answered questions regarding the costs associated with Medicare Benefits. I explained to Jerod Ksier the estimated cost of treatment and services for six months under the [employer]. Jerod Kiser was advised to contact Medicare or their healthcare provider for questions or concerns related to service of care. The Oncology Care Model provides different options of contact for Jerod Kiser regarding concerns and complaints of treatments and services. Next, I spoke with Jerod Kiser regarding her Prescription Drug Benefits. Next, I spoke with Jerod Kiser about billing questions and treatment services. Next, I spoke with Jerod Kiser regarding the Limited Power of [employer] document. After reading the form, Jerod Kiser signed the Limited Power of  document. Next, I spoke with Jerod Kiser regarding potential oral medication authorizations. I told her that if she ever had any problems getting her oral medications filled to give the dedicated [facility]  a call. Most of the time, it is simply an authorization that needs to be done with the insurance company. Next, I gave Acquanetta Bare flyers about the free Yoga classes for cancer survivors and the Oncology Massage program.  I let her know that he can get a free 30-minute massage.   Lastly, I gave Jerod Kiser a form with various resource organizations that could assist with specific needs (example:  transportation, lodging, preparing meals, home cleaning)      Don Arce expressed understanding of the information above and all questions were answered to her satisfaction.

## 2020-12-06 ENCOUNTER — ANESTHESIA EVENT (OUTPATIENT)
Dept: SURGERY | Age: 80
End: 2020-12-06
Payer: MEDICARE

## 2020-12-06 RX ORDER — DIPHENHYDRAMINE HYDROCHLORIDE 50 MG/ML
12.5 INJECTION, SOLUTION INTRAMUSCULAR; INTRAVENOUS
Status: CANCELLED | OUTPATIENT
Start: 2020-12-06

## 2020-12-06 RX ORDER — SODIUM CHLORIDE, SODIUM LACTATE, POTASSIUM CHLORIDE, CALCIUM CHLORIDE 600; 310; 30; 20 MG/100ML; MG/100ML; MG/100ML; MG/100ML
75 INJECTION, SOLUTION INTRAVENOUS CONTINUOUS
Status: CANCELLED | OUTPATIENT
Start: 2020-12-06

## 2020-12-06 RX ORDER — ACETAMINOPHEN 500 MG
1000 TABLET ORAL ONCE
Status: CANCELLED | OUTPATIENT
Start: 2020-12-06 | End: 2020-12-06

## 2020-12-06 RX ORDER — OXYCODONE HYDROCHLORIDE 5 MG/1
5 TABLET ORAL
Status: CANCELLED | OUTPATIENT
Start: 2020-12-06 | End: 2020-12-07

## 2020-12-06 RX ORDER — HYDROMORPHONE HYDROCHLORIDE 2 MG/ML
0.2 INJECTION, SOLUTION INTRAMUSCULAR; INTRAVENOUS; SUBCUTANEOUS
Status: CANCELLED | OUTPATIENT
Start: 2020-12-06

## 2020-12-06 RX ORDER — SODIUM CHLORIDE, SODIUM LACTATE, POTASSIUM CHLORIDE, CALCIUM CHLORIDE 600; 310; 30; 20 MG/100ML; MG/100ML; MG/100ML; MG/100ML
100 INJECTION, SOLUTION INTRAVENOUS CONTINUOUS
Status: CANCELLED | OUTPATIENT
Start: 2020-12-06 | End: 2020-12-07

## 2020-12-06 RX ORDER — LIDOCAINE HYDROCHLORIDE 10 MG/ML
0.1 INJECTION INFILTRATION; PERINEURAL AS NEEDED
Status: CANCELLED | OUTPATIENT
Start: 2020-12-06

## 2020-12-06 RX ORDER — ONDANSETRON 2 MG/ML
4 INJECTION INTRAMUSCULAR; INTRAVENOUS
Status: CANCELLED | OUTPATIENT
Start: 2020-12-06 | End: 2020-12-07

## 2020-12-07 ENCOUNTER — HOSPITAL ENCOUNTER (OUTPATIENT)
Age: 80
Setting detail: OUTPATIENT SURGERY
Discharge: HOME | End: 2020-12-07
Attending: RADIOLOGY | Admitting: RADIOLOGY
Payer: MEDICARE

## 2020-12-07 ENCOUNTER — HOSPITAL ENCOUNTER (OUTPATIENT)
Dept: INTERVENTIONAL RADIOLOGY/VASCULAR | Age: 80
Discharge: HOME | End: 2020-12-07
Attending: INTERNAL MEDICINE
Payer: MEDICARE

## 2020-12-07 ENCOUNTER — ANESTHESIA (OUTPATIENT)
Dept: SURGERY | Age: 80
End: 2020-12-07
Payer: MEDICARE

## 2020-12-07 VITALS
HEART RATE: 92 BPM | TEMPERATURE: 97.7 F | SYSTOLIC BLOOD PRESSURE: 100 MMHG | DIASTOLIC BLOOD PRESSURE: 58 MMHG | RESPIRATION RATE: 16 BRPM | OXYGEN SATURATION: 96 %

## 2020-12-07 VITALS
OXYGEN SATURATION: 95 % | SYSTOLIC BLOOD PRESSURE: 121 MMHG | HEART RATE: 77 BPM | RESPIRATION RATE: 16 BRPM | TEMPERATURE: 97.7 F | HEIGHT: 65 IN | BODY MASS INDEX: 23.32 KG/M2 | DIASTOLIC BLOOD PRESSURE: 60 MMHG | WEIGHT: 140 LBS

## 2020-12-07 DIAGNOSIS — C34.91 MALIGNANT NEOPLASM OF RIGHT LUNG, UNSPECIFIED PART OF LUNG (HCC): ICD-10-CM

## 2020-12-07 PROCEDURE — 74011000250 HC RX REV CODE- 250: Performed by: NURSE ANESTHETIST, CERTIFIED REGISTERED

## 2020-12-07 PROCEDURE — 77030010507 HC ADH SKN DERMBND J&J -B

## 2020-12-07 PROCEDURE — 74011250636 HC RX REV CODE- 250/636: Performed by: NURSE ANESTHETIST, CERTIFIED REGISTERED

## 2020-12-07 PROCEDURE — 77030031139 HC SUT VCRL2 J&J -A

## 2020-12-07 PROCEDURE — 74011250636 HC RX REV CODE- 250/636: Performed by: PHYSICIAN ASSISTANT

## 2020-12-07 PROCEDURE — C1894 INTRO/SHEATH, NON-LASER: HCPCS

## 2020-12-07 PROCEDURE — 36561 INSERT TUNNELED CV CATH: CPT

## 2020-12-07 PROCEDURE — C1788 PORT, INDWELLING, IMP: HCPCS

## 2020-12-07 PROCEDURE — 74011000250 HC RX REV CODE- 250: Performed by: PHYSICIAN ASSISTANT

## 2020-12-07 PROCEDURE — 77030031131 HC SUT MXN P COVD -B

## 2020-12-07 PROCEDURE — 76060000032 HC ANESTHESIA 0.5 TO 1 HR: Performed by: RADIOLOGY

## 2020-12-07 RX ORDER — LIDOCAINE HYDROCHLORIDE 20 MG/ML
INJECTION, SOLUTION EPIDURAL; INFILTRATION; INTRACAUDAL; PERINEURAL AS NEEDED
Status: DISCONTINUED | OUTPATIENT
Start: 2020-12-07 | End: 2020-12-07 | Stop reason: HOSPADM

## 2020-12-07 RX ORDER — CEFAZOLIN SODIUM/WATER 2 G/20 ML
2 SYRINGE (ML) INTRAVENOUS ONCE
Status: COMPLETED | OUTPATIENT
Start: 2020-12-07 | End: 2020-12-07

## 2020-12-07 RX ORDER — SODIUM CHLORIDE, SODIUM LACTATE, POTASSIUM CHLORIDE, CALCIUM CHLORIDE 600; 310; 30; 20 MG/100ML; MG/100ML; MG/100ML; MG/100ML
INJECTION, SOLUTION INTRAVENOUS
Status: DISCONTINUED | OUTPATIENT
Start: 2020-12-07 | End: 2020-12-07 | Stop reason: HOSPADM

## 2020-12-07 RX ORDER — PROPOFOL 10 MG/ML
INJECTION, EMULSION INTRAVENOUS
Status: DISCONTINUED | OUTPATIENT
Start: 2020-12-07 | End: 2020-12-07 | Stop reason: HOSPADM

## 2020-12-07 RX ORDER — PROPOFOL 10 MG/ML
INJECTION, EMULSION INTRAVENOUS AS NEEDED
Status: DISCONTINUED | OUTPATIENT
Start: 2020-12-07 | End: 2020-12-07 | Stop reason: HOSPADM

## 2020-12-07 RX ORDER — HEPARIN SODIUM (PORCINE) LOCK FLUSH IV SOLN 100 UNIT/ML 100 UNIT/ML
500 SOLUTION INTRAVENOUS ONCE
Status: COMPLETED | OUTPATIENT
Start: 2020-12-07 | End: 2020-12-07

## 2020-12-07 RX ORDER — LIDOCAINE HYDROCHLORIDE AND EPINEPHRINE 10; 10 MG/ML; UG/ML
1-20 INJECTION, SOLUTION INFILTRATION; PERINEURAL
Status: DISCONTINUED | OUTPATIENT
Start: 2020-12-07 | End: 2020-12-11 | Stop reason: HOSPADM

## 2020-12-07 RX ADMIN — Medication 2 G: at 14:17

## 2020-12-07 RX ADMIN — PROPOFOL 140 MCG/KG/MIN: 10 INJECTION, EMULSION INTRAVENOUS at 14:16

## 2020-12-07 RX ADMIN — SODIUM CHLORIDE, SODIUM LACTATE, POTASSIUM CHLORIDE, AND CALCIUM CHLORIDE: 600; 310; 30; 20 INJECTION, SOLUTION INTRAVENOUS at 14:12

## 2020-12-07 RX ADMIN — LIDOCAINE HYDROCHLORIDE AND EPINEPHRINE 20 MG: 10; 10 INJECTION, SOLUTION INFILTRATION; PERINEURAL at 14:27

## 2020-12-07 RX ADMIN — PROPOFOL 50 MG: 10 INJECTION, EMULSION INTRAVENOUS at 14:16

## 2020-12-07 RX ADMIN — HEPARIN SODIUM (PORCINE) LOCK FLUSH IV SOLN 100 UNIT/ML 500 UNITS: 100 SOLUTION at 14:38

## 2020-12-07 RX ADMIN — LIDOCAINE HYDROCHLORIDE 50 MG: 20 INJECTION, SOLUTION EPIDURAL; INFILTRATION; INTRACAUDAL; PERINEURAL at 14:16

## 2020-12-07 RX ADMIN — LIDOCAINE HYDROCHLORIDE AND EPINEPHRINE 90 MG: 10; 10 INJECTION, SOLUTION INFILTRATION; PERINEURAL at 14:30

## 2020-12-07 NOTE — PROGRESS NOTES
TRANSFER - OUT REPORT:    Verbal report given to Nathan Zavala RN on Leata File  being transferred to IR Recovery for routine post - op       Report consisted of patients Situation, Background, Assessment and   Recommendations(SBAR). Information from the following report(s) SBAR, Procedure Summary and MAR was reviewed with the receiving nurse. Opportunity for questions and clarification was provided. Conscious Sedation:   See Anesthesia Record    Pt tolerated procedure well.      Visit Vitals  BP (!) 103/56   Pulse 81   Temp 98.2 °F (36.8 °C)   Resp 16   Ht 5' 5\" (1.651 m)   Wt 63.5 kg (140 lb)   SpO2 94%   Breastfeeding No   BMI 23.30 kg/m²     Past Medical History:   Diagnosis Date    Arthritis     osteo-left hand    Autoimmune disease (HCC)     fibromyalgia    Cancer (HCC)     lung cancer, right    Compression fracture of T12 vertebra (HCC) 8/1/2014    Endometriosis     hyst    Fibrocystic breast     Fibromyalgia     GERD (gastroesophageal reflux disease)     nexium daily    Glaucoma     Followed by Dr. Brandi Hunter Headache     rare (not migraines)    Heart failure (HCC)     \"possible CHF\"    IBS (irritable bowel syndrome)     bentyl    Ovarian cyst     hx of-bilateral S&O    Pleural effusion     Rectal itching 3/24/2017    Severe anemia 11/2/2020    Stress due to spouse with dementia 4/2/2019    UTI (urinary tract infection)     hx of; last one 2-3 years ago    Vaginitis, atrophic     asymptomatic at present                 Venous Access Device Power [employer] [place] 12/07/20 Upper chest (subclavicular area, right (Active)   Central Line Being Utilized No 12/07/20 1442   Dressing Status Clean, dry, & intact 12/07/20 1442   Dressing Type Topical skin adhesive 12/07/20 1442

## 2020-12-07 NOTE — PROCEDURES
Department of Interventional Radiology  [phone]        Interventional Radiology Brief Procedure Note    Patient: Mikaela Kinsey MRN: [id number]  SSN: [id number]    YOB: 1940  Age: 79 y.o. Sex: female      Date of Procedure: 12/7/2020    Pre-Procedure Diagnosis: port    Post-Procedure Diagnosis: SAME    Procedure(s): Venous Chest Port Placement    Brief Description of Procedure: US, fluoro guided right IJ venous chest port placed.     Performed By: Angela Chaves PA-C     Assistants: None    Anesthesia:TIVS/MAC    Estimated Blood Loss: Less than 10ml    Specimens:  None    Implants:  Chest Port Placement    Findings: catheter tip in right atrium     Complications: None    Recommendations: ok to use port     Follow Up: prn    Signed By: Angela Chaves PA-C     December 7, 2020

## 2020-12-07 NOTE — H&P
Department of Interventional Radiology  [phone]    History and Physical    Patient:  Yamileth Huff MRN:  [id number]  SSN:  [id number]    YOB: 1940  Age:  79 y.o. Sex:  female      Primary Care Provider:  Roxanne Lowe MD  Referring Physician:  Violetta Hernandez MD    Subjective:     Chief Complaint: port    History of the Present Illness: The patient is a 79 y.o. female with adenocarcinoma who presents for venous chest port placement. Adenocarcinoma. NPO. Past Medical History:   Diagnosis Date    Arthritis     osteo-left hand    Autoimmune disease ([address]     fibromyalgia    Cancer ([address]     lung cancer, right    Compression fracture of T12 vertebra ([address] 8/1/2014    Endometriosis     hyst    Fibrocystic breast     Fibromyalgia     GERD (gastroesophageal reflux disease)     nexium daily    Glaucoma     Followed by Dr. Wai Strong Headache     rare (not migraines)    Heart failure ([facility]     \"possible CHF\"    IBS (irritable bowel syndrome)     bentyl    Ovarian cyst     hx of-bilateral S&O    Pleural effusion     Rectal itching 3/24/2017    Severe anemia 11/2/2020    Stress due to spouse with dementia 4/2/2019    UTI (urinary tract infection)     hx of; last one 2-3 years ago    Vaginitis, atrophic     asymptomatic at present     Past Surgical History:   Procedure Laterality Date    HX BREAST BIOPSY Bilateral 1980    HX HEMORRHOIDECTOMY  1970    HX HYSTERECTOMY Bilateral 1986    full    HX KYPHOPLASTY N/A 08/2014    HX OVARIAN CYST REMOVAL  1984    Both ovaries have been removed    HX [facility]    HX VAGINAL VAULT SUSPENSION  2009        Review of Systems:    Pertinent items are noted in the History of Present Illness. Prior to Admission medications    Medication Sig Start Date End Date Taking? Authorizing Provider   furosemide (LASIX) 20 mg tablet Take 1 Tab by mouth daily. Patient taking differently: Take 20 mg by mouth.  Every other day 11/24/20  Yes Chon Pena MD   folic acid (FOLVITE) 1 mg tablet Take 1 Tab by mouth daily. 11/24/20  Yes Oniel Lorenzana MD   hydrocortisone-pramoxine (Analpram-HC) 2.5-1 % rectal cream Insert  into rectum three (3) times daily. 11/2/20  Yes Chon Pena MD   Lactobacillus acidophilus (BACID) cap Take 2 Caps by mouth two (2) times a day. 10/30/20  Yes John Moses MD   timolol (TIMOPTIC) 0.5 % ophthalmic solution PLACE 1 DROP IN BOTH EYES IN THE MORNING 1/10/17  Yes Provider, Historical   triamcinolone (NASACORT AQ) 55 mcg nasal inhaler 2 Sprays daily. Yes Provider, Historical   cetirizine (ZYRTEC) 10 mg tablet Take  by mouth every evening. Yes Provider, Historical   esomeprazole (NEXIUM) 20 mg capsule Take 20 mg by mouth every morning. Yes Provider, Historical   latanoprost (XALATAN) 0.005 % ophthalmic solution Administer 1 Drop to both eyes nightly. Yes Provider, Historical   prochlorperazine (Compazine) 10 mg tablet Take 1 Tab by mouth every six (6) hours as needed for Nausea. Indications: prevent nausea and vomiting from cancer chemotherapy 11/24/20   Oniel Lorenzana MD   ondansetron hcl (Zofran) 8 mg tablet Take 1 Tab by mouth every eight (8) hours as needed for Nausea. Indications: prevent nausea and vomiting from cancer chemotherapy 11/24/20   Oniel Lorenzana MD   lidocaine-prilocaine (EMLA) topical cream Apply to port about 45 minutes prior to access 11/24/20   Oniel Lorenzana MD   dexAMETHasone (Decadron) 4 mg tablet Take 8 mg by mouth two (2) times daily (with meals). Take 2 tablets twice a day - day before and day after chemo treatment 11/24/20   Oniel Lorenzana MD   hyoscyamine SL (Levsin/SL) 0.125 mg SL tablet 1 Tab by SubLINGual route every four (4) hours as needed for Cramping. Indications: irritable colon 11/2/20   Chon Pena MD   acetaminophen (TYLENOL) 325 mg tablet Take 2 Tabs by mouth every six (6) hours as needed for Pain.  10/30/20   John Moses, MD        Allergies   Allergen Reactions    Seasonale [Levonorgestrel-Ethinyl Estrad] Runny Nose and Sneezing     Patient denies ever taking medication    Ciprofibrate Diarrhea    Ciprofloxacin Diarrhea and Other (comments)    Penicillins Rash       Family History   Problem Relation Age of Onset    Arthritis-osteo Mother     GERD Mother     Cancer Mother         stomach cancer    Elevated Lipids Father     Heart Disease Father 76    Hypertension Father     Heart Attack Father     GERD Father     GERD Brother     GERD Son     GERD Brother     GERD Brother     Thyroid Disease Daughter     Breast Cancer Neg Hx      Social History     Tobacco Use    Smoking status: Former Smoker     Last attempt to quit: 1986     Years since quittin.3    Smokeless tobacco: Never Used   Substance Use Topics    Alcohol use:  Yes     Alcohol/week: 5.8 standard drinks     Types: 7 Glasses of wine per week        Objective:       Physical Examination:    Vitals:    20 1037   BP: 139/67   Pulse: 85   Resp: 18   Temp: 98.2 °F (36.8 °C)   SpO2: 95%   Weight: 63.5 kg (140 lb)   Height: 5' 5\" (1.651 m)       Pain Assessment  Pain Intensity 1: 0 (20 1037)               HEART: regular rate and rhythm  LUNG: clear to auscultation bilaterally, diminished right  ABDOMEN: normal findings: soft, non-tender  EXTREMITIES: normal strength, tone, and muscle mass    Laboratory:     Lab Results   Component Value Date/Time    Sodium 139 2020 01:11 PM    Sodium 143 10/30/2020 05:13 AM    Potassium 4.1 2020 01:11 PM    Potassium 3.6 10/30/2020 05:13 AM    Chloride 105 2020 01:11 PM    Chloride 112 (H) 10/30/2020 05:13 AM    CO2 29 2020 01:11 PM    CO2 29 10/30/2020 05:13 AM    Anion gap 5 (L) 2020 01:11 PM    Anion gap 2 (L) 10/30/2020 05:13 AM    Glucose 96 2020 01:11 PM    Glucose 97 10/30/2020 05:13 AM    BUN 10 2020 01:11 PM    BUN 6 (L) 10/30/2020 05:13 AM    Creatinine 0.70 11/06/2020 01:11 PM    Creatinine 0.58 (L) 10/30/2020 05:13 AM    GFR est AA >60 11/06/2020 01:11 PM    GFR est AA >60 10/30/2020 05:13 AM    GFR est non-AA >60 11/06/2020 01:11 PM    GFR est non-AA >60 10/30/2020 05:13 AM    Calcium 9.1 11/06/2020 01:11 PM    Calcium 8.2 (L) 10/30/2020 05:13 AM    Magnesium 2.2 08/01/2014 01:20 PM    Albumin 3.5 11/06/2020 01:11 PM    Albumin 3.5 10/26/2020 12:08 PM    Protein, total 6.3 11/06/2020 01:11 PM    Protein, total 6.6 10/26/2020 12:08 PM    Globulin 2.8 11/06/2020 01:11 PM    Globulin 3.1 10/26/2020 12:08 PM    A-G Ratio 1.3 11/06/2020 01:11 PM    A-G Ratio 1.1 (L) 10/26/2020 12:08 PM    ALT (SGPT) 26 11/06/2020 01:11 PM    ALT (SGPT) 21 10/26/2020 12:08 PM     Lab Results   Component Value Date/Time    WBC 6.5 11/06/2020 01:11 PM    WBC 4.4 10/30/2020 05:13 AM    HGB 10.3 (L) 11/06/2020 01:11 PM    HGB 7.8 (L) 10/30/2020 05:13 AM    HCT 31.2 (L) 11/06/2020 01:11 PM    HCT 23.8 (L) 10/30/2020 05:13 AM    PLATELET 394 50/54/5885 01:11 PM    PLATELET 734 70/91/5697 05:13 AM     No results found for: APTT, PTP, INR, INREXT    Assessment:     adenocarcinoma    Hospital Problems  Date Reviewed: 12/7/2020    None          Plan:     Planned Procedure:  Port placement    Risks, benefits, and alternatives reviewed with patient and she agrees to proceed with the procedure.       Signed By: Chadwick Hernandez PA-C     December 7, 2020

## 2020-12-07 NOTE — ANESTHESIA POSTPROCEDURE EVALUATION
Procedure(s):  IR ANESTHESIA/PORT INSERT.    total IV anesthesia    Anesthesia Post Evaluation      Multimodal analgesia: multimodal analgesia used between 6 hours prior to anesthesia start to PACU discharge  Patient location during evaluation: bedside  Patient participation: complete - patient participated  Level of consciousness: awake and alert  Pain management: adequate  Airway patency: patent  Anesthetic complications: no  Cardiovascular status: hemodynamically stable  Respiratory status: spontaneous ventilation  Hydration status: euvolemic  Comments: Patient stable and may discharge at this time. Post anesthesia nausea and vomiting:  none  Final Post Anesthesia Temperature Assessment:  Normothermia (36.0-37.5 degrees C)      INITIAL Post-op Vital signs: No vitals data found for the desired time range.

## 2020-12-07 NOTE — PROGRESS NOTES
Patient in IR [address] for procedure. Anesthesia has assumed care of patient for the duration of procedure. Please see anesthesia record for documentation. IR Nurse Pre-Procedure Checklist Part 2          Consent signed: Yes    H&P complete:  Yes    Antibiotics: Yes    Airway/Mallampati Done: Yes    Shaved: Not applicable    Pregnancy Form:Not applicable    Patient Position: Yes    MD Side: Yes     Biopsy Worksheet: Not applicable    Specimen Medium: Not applicable  The patient was counseled at length about the risks of lizandro Covid-19 during their perioperative period and any recovery window from their procedure. The patient was made aware that lizandro Covid-19  may worsen their prognosis for recovering from their procedure and lend to a higher morbidity and/or mortality risk. All material risks, benefits, and reasonable alternatives including postponing the procedure were discussed. The patient does  wish to proceed with the procedure at this time.

## 2020-12-07 NOTE — ANESTHESIA PREPROCEDURE EVALUATION
Anesthetic History     PONV          Review of Systems / Medical History  Patient summary reviewed and pertinent labs reviewed    Pulmonary          Smoker (quit 35 years.)      Comments: Lung ca, recent effusion drained, 4 liters drained end of October. , breathing much better since.    Neuro/Psych   Within defined limits           Cardiovascular              CAD    Exercise tolerance: >4 METS     GI/Hepatic/Renal     GERD: well controlled           Endo/Other        Arthritis     Other Findings   Comments: Fibromyalgia           Physical Exam    Airway  Mallampati: II  TM Distance: 4 - 6 cm  Neck ROM: normal range of motion   Mouth opening: Normal     Cardiovascular    Rhythm: regular  Rate: abnormal         Dental    Dentition: Upper partial plate     Pulmonary      Decreased breath sounds: right      Prolonged expiration     Abdominal         Other Findings            Anesthetic Plan    ASA: 3  Anesthesia type: total IV anesthesia          Induction: Intravenous  Anesthetic plan and risks discussed with: Patient

## 2020-12-07 NOTE — DISCHARGE INSTRUCTIONS
[address]  Department of Interventional Radiology  [facility]  [phone] Office  [phone] Fax  Implanted Port Discharge Instructions      General Instructions:   A port is like an implanted IV. They are usually ordered for patients who will be getting chemotherapy, but can also be used as an IV for long term antibiotics, large amounts of fluids, and/or blood products. Your blood can be drawn from your port for labs also. Those patients who do not have good veins find the ports convenient as they can get the IV they need with one stick. The port can be used long term, and the care is easy. The device is under the skin, and once the skin heals, care is minimal. All that is required is the nurse who accesses the port will need to flush it with heparinized saline after each use. Ports are usually placed in the chest wall, usually on the right side. But they can be place in the arms and in the abdomen. Home Care Instructions: If your port is in your arm, do not allow blood pressure or other IVs to be place in that arm. Do not allow bra straps or any clothing to rub the skin over the port. Do not bathe or swim until the skin has healed and if the port is accessed. Once it is healed, and when the port is not accessed, it is okay to bathe and swim. Restrict yourself to light activity for the first 5 days after getting the port put in, after that, resume normal activity slowly. You may resume your normal diet and medications. Follow-Up Instructions: Please see your oncologist, or whatever physician ordered the port as he/she has requested of you. Call If: You should call your Physician and/or the Radiology Nurse if you notice redness, pus, swelling, or pain from the area of your incision. Call if you should develop a fever. The nurses who access your port will know to call your doctor if the port does not seem to be working properly. You need to tell the nurses who use the port if you should have any pain or swelling at the site during an infusion. To Reach Us: If you have any questions about your procedure, please call the Interventional Radiology department at [phone]. After business hours (5pm) and weekends, call the answering service at [phone] and ask for the Radiologist on call to be paged. Si tiene Preguntas acerca del procedimiento, por favor llame al departamento de Radiología Intervencional al [phone]. Después de horas de oficina (5 pm) y los fines de [place], llamar al Lashay Graves al [phone] y pregunte por el Radiologo de Elpidio Jenkins. Interventional Radiology General Nurse Discharge    After general anesthesia or intravenous sedation, for 24 hours or while taking prescription Narcotics:  · Limit your activities  · Do not drive and operate hazardous machinery  · Do not make important personal or business decisions  · Do  not drink alcoholic beverages  · If you have not urinated within 8 hours after discharge, please contact your surgeon on call. * Please give a list of your current medications to your Primary Care Provider. * Please update this list whenever your medications are discontinued, doses are     changed, or new medications (including over-the-counter products) are added. * Please carry medication information at all times in case of emergency situations. These are general instructions for a healthy lifestyle:    No smoking/ No tobacco products/ Avoid exposure to second hand smoke  Surgeon General's Warning:  Quitting smoking now greatly reduces serious risk to your health.     Obesity, smoking, and sedentary lifestyle greatly increases your risk for illness  A healthy diet, regular physical exercise & weight monitoring are important for maintaining a healthy lifestyle    You may be retaining fluid if you have a history of heart failure or if you experience any of the following symptoms:  Weight gain of 3 pounds or more overnight or 5 pounds in a week, increased swelling in our hands or feet or shortness of breath while lying flat in bed. Please call your doctor as soon as you notice any of these symptoms; do not wait until your next office visit. Recognize signs and symptoms of STROKE:  F-face looks uneven    A-arms unable to move or move unevenly    S-speech slurred or non-existent    T-time-call 911 as soon as signs and symptoms begin-DO NOT go       Back to bed or wait to see if you get better-TIME IS BRAIN.       Patient Signature:  Date: 12/7/2020  Discharging Nurse: Mely Palomino RN

## 2020-12-11 ENCOUNTER — PATIENT OUTREACH (OUTPATIENT)
Dept: CASE MANAGEMENT | Age: 80
End: 2020-12-11

## 2020-12-11 ENCOUNTER — HOSPITAL ENCOUNTER (OUTPATIENT)
Dept: INTERVENTIONAL RADIOLOGY/VASCULAR | Age: 80
Discharge: HOME | End: 2020-12-11
Attending: INTERNAL MEDICINE
Payer: MEDICARE

## 2020-12-11 VITALS
SYSTOLIC BLOOD PRESSURE: 154 MMHG | TEMPERATURE: 98 F | RESPIRATION RATE: 20 BRPM | DIASTOLIC BLOOD PRESSURE: 66 MMHG | HEART RATE: 97 BPM | OXYGEN SATURATION: 97 %

## 2020-12-11 DIAGNOSIS — C34.91 MALIGNANT NEOPLASM OF RIGHT LUNG, UNSPECIFIED PART OF LUNG (HCC): ICD-10-CM

## 2020-12-11 PROCEDURE — 32555 ASPIRATE PLEURA W/ IMAGING: CPT

## 2020-12-11 PROCEDURE — 77030014146 HC TY THORCENT/PARACENT BD -B

## 2020-12-11 NOTE — DISCHARGE INSTRUCTIONS
[address]  Department of Interventional Radiology  [address] [place] [address] [address] [facility]  [phone] Office  [phone] Fax    THORACENTESIS DISCHARGE INSTRUCTIONS    General Information:  During this procedures, the doctor will insert a needle into the body to drain fluid from the chest. After the procedure, you will be able to take a deep breath much easier. The site of the puncture may ooze the first day. This will decrease and eventually stop. With the Thoracentesis (draining fluid from the chest), there is a risk of air leaking into the chest around the lung, and risk of bleeding into the chest, with the resulting pressure on the lung possibly making it collapse. A chest x-ray is done after the procedure to detect possible complications. Home Care Instructions:  Keep the puncture site clean and dry. No tub baths or swimming until puncture site heals. Showering is acceptable. Resume your normal diet, and resume your normal activity slowly and as you tolerate. If you are short of breath, rest. If shortness of breath does not ease, please call your ordering doctor. Fluid can re-accumulate in the chest and/or in the abdomen. If this should occur, your doctor needs to know as you may need to have the procedure done again. Call If:     You should call your Physician and/or the Radiology Nurse if you notice any signs of infection, like pus draining, or if it is swollen or reddened. Also call if you have a fever, or if you are bleeding from the puncture site more than a small amount on the dressing. Call if the puncture site keeps draining fluid. Some oozing is to be expected, but should slow and then stop. Call if you feel like you have pressure in your abdomen. SEEK IMMEDIATE CARE OR CALL 911 IF YOU SUDDENLY HAVE TROUBLE BREATHING, OR IF YOUR LIPS TURN BLUE, OR IF YOU NOTICE BLOOD IN YOUR SPUTUM.     Follow-Up Instructions: Please see your ordering doctor as he/she has requested. To Reach Us: If you have any questions about your procedure, please call the Interventional Radiology department at [phone]. After business hours (5pm) and weekends, call the answering service at [phone] and ask for the Radiologist on call to be paged. Si tiene Preguntas acerca del procedimiento, por favor llame al departamento de Radiología Intervencional al [phone]. Después de horas de oficina (5 pm) y los fines de [place], llamar al Yi Graves al [phone] y pregunte por el Radiologo de [place] citizen Jefferson Yayarijaylyn. Interventional Radiology General Nurse Discharge    After general anesthesia or intravenous sedation, for 24 hours or while taking prescription Narcotics:  · Limit your activities  · Do not drive and operate hazardous machinery  · Do not make important personal or business decisions  · Do  not drink alcoholic beverages  · If you have not urinated within 8 hours after discharge, please contact your surgeon on call. * Please give a list of your current medications to your Primary Care Provider. * Please update this list whenever your medications are discontinued, doses are     changed, or new medications (including over-the-counter products) are added. * Please carry medication information at all times in case of emergency situations. These are general instructions for a healthy lifestyle:    No smoking/ No tobacco products/ Avoid exposure to second hand smoke  Surgeon General's Warning:  Quitting smoking now greatly reduces serious risk to your health.     Obesity, smoking, and sedentary lifestyle greatly increases your risk for illness  A healthy diet, regular physical exercise & weight monitoring are important for maintaining a healthy lifestyle    You may be retaining fluid if you have a history of heart failure or if you experience any of the following symptoms:  Weight gain of 3 pounds or more overnight or 5 pounds in a week, increased swelling in our hands or feet or shortness of breath while lying flat in bed. Please call your doctor as soon as you notice any of these symptoms; do not wait until your next office visit. Recognize signs and symptoms of STROKE:  F-face looks uneven    A-arms unable to move or move unevenly    S-speech slurred or non-existent    T-time-call 911 as soon as signs and symptoms begin-DO NOT go       Back to bed or wait to see if you get better-TIME IS BRAIN.         Date: 12/11/2020  Discharging Nurse: George Frank

## 2020-12-11 NOTE — PROGRESS NOTES
Right thoracentesis complete. 1000 ml dwight colored fluid removed. Catheter removed and manual pressure held until hemostasis. Surgical adhesive applied to puncture site.

## 2020-12-11 NOTE — PROGRESS NOTES
12/11/20 pt called triage yesterday to report increased SOB with activity. Feels like she needs some fluid pulled off lungs. Pulmonary was not able to see pt today. Arranged thoracentesis with IR today. Pt is aware of time of procedure. Navigation will continue to follow.

## 2020-12-15 ENCOUNTER — PATIENT OUTREACH (OUTPATIENT)
Dept: CASE MANAGEMENT | Age: 80
End: 2020-12-15

## 2020-12-15 ENCOUNTER — HOSPITAL ENCOUNTER (OUTPATIENT)
Dept: LAB | Age: 80
Discharge: HOME | End: 2020-12-15
Payer: MEDICARE

## 2020-12-15 DIAGNOSIS — Z79.899 HIGH RISK MEDICATION USE: ICD-10-CM

## 2020-12-15 DIAGNOSIS — C34.91 MALIGNANT NEOPLASM OF RIGHT LUNG, UNSPECIFIED PART OF LUNG (HCC): ICD-10-CM

## 2020-12-15 LAB
ALBUMIN SERPL-MCNC: 3.8 G/DL (ref 3.2–4.6)
ALBUMIN/GLOB SERPL: 1.3 {RATIO} (ref 1.2–3.5)
ALP SERPL-CCNC: 70 U/L (ref 50–136)
ALT SERPL-CCNC: 18 U/L (ref 12–65)
ANION GAP SERPL CALC-SCNC: 5 MMOL/L (ref 7–16)
AST SERPL-CCNC: 14 U/L (ref 15–37)
BASOPHILS # BLD: 0 K/UL (ref 0–0.2)
BASOPHILS NFR BLD: 0 % (ref 0–2)
BILIRUB SERPL-MCNC: 0.3 MG/DL (ref 0.2–1.1)
BUN SERPL-MCNC: 11 MG/DL (ref 8–23)
CALCIUM SERPL-MCNC: 9.1 MG/DL (ref 8.3–10.4)
CHLORIDE SERPL-SCNC: 103 MMOL/L (ref 98–107)
CO2 SERPL-SCNC: 30 MMOL/L (ref 21–32)
CREAT SERPL-MCNC: 0.7 MG/DL (ref 0.6–1)
DIFFERENTIAL METHOD BLD: ABNORMAL
EOSINOPHIL # BLD: 0.2 K/UL (ref 0–0.8)
EOSINOPHIL NFR BLD: 4 % (ref 0.5–7.8)
ERYTHROCYTE [DISTWIDTH] IN BLOOD BY AUTOMATED COUNT: 12.8 % (ref 11.9–14.6)
GLOBULIN SER CALC-MCNC: 2.9 G/DL (ref 2.3–3.5)
GLUCOSE SERPL-MCNC: 98 MG/DL (ref 65–100)
HCT VFR BLD AUTO: 37.3 % (ref 35.8–46.3)
HGB BLD-MCNC: 12 G/DL (ref 11.7–15.4)
IMM GRANULOCYTES # BLD AUTO: 0 K/UL (ref 0–0.5)
IMM GRANULOCYTES NFR BLD AUTO: 1 % (ref 0–5)
LYMPHOCYTES # BLD: 1.3 K/UL (ref 0.5–4.6)
LYMPHOCYTES NFR BLD: 22 % (ref 13–44)
MAGNESIUM SERPL-MCNC: 2.4 MG/DL (ref 1.8–2.4)
MCH RBC QN AUTO: 30.3 PG (ref 26.1–32.9)
MCHC RBC AUTO-ENTMCNC: 32.2 G/DL (ref 31.4–35)
MCV RBC AUTO: 94.2 FL (ref 79.6–97.8)
MONOCYTES # BLD: 0.6 K/UL (ref 0.1–1.3)
MONOCYTES NFR BLD: 10 % (ref 4–12)
NEUTS SEG # BLD: 3.7 K/UL (ref 1.7–8.2)
NEUTS SEG NFR BLD: 64 % (ref 43–78)
NRBC # BLD: 0 K/UL (ref 0–0.2)
PLATELET # BLD AUTO: 178 K/UL (ref 150–450)
PMV BLD AUTO: 10.3 FL (ref 9.4–12.3)
POTASSIUM SERPL-SCNC: 3.9 MMOL/L (ref 3.5–5.1)
PROT SERPL-MCNC: 6.7 G/DL (ref 6.3–8.2)
RBC # BLD AUTO: 3.96 M/UL (ref 4.05–5.25)
SODIUM SERPL-SCNC: 138 MMOL/L (ref 136–145)
T3 SERPL-MCNC: 0.88 NG/ML (ref 0.6–1.81)
T4 FREE SERPL-MCNC: 0.8 NG/DL (ref 0.78–1.46)
TSH SERPL DL<=0.005 MIU/L-ACNC: 2.3 UIU/ML (ref 0.36–3.74)
WBC # BLD AUTO: 5.8 K/UL (ref 4.3–11.1)

## 2020-12-15 PROCEDURE — 84439 ASSAY OF FREE THYROXINE: CPT

## 2020-12-15 PROCEDURE — 84480 ASSAY TRIIODOTHYRONINE (T3): CPT

## 2020-12-15 PROCEDURE — 84443 ASSAY THYROID STIM HORMONE: CPT

## 2020-12-15 PROCEDURE — 36415 COLL VENOUS BLD VENIPUNCTURE: CPT

## 2020-12-15 PROCEDURE — 83735 ASSAY OF MAGNESIUM: CPT

## 2020-12-15 PROCEDURE — 80053 COMPREHEN METABOLIC PANEL: CPT

## 2020-12-15 PROCEDURE — 85025 COMPLETE CBC W/AUTO DIFF WBC: CPT

## 2020-12-15 NOTE — PROGRESS NOTES
12/15/20 saw pt today with Dr. Colby Emmanuel for pre chemo cycle 1 carbo/alimta/keytruda. See pt instructions for symptom management. She is feeling much better since thoracentesis on 12/11. PO intake is good. Infusion on 12/17. Provided opportunity to ask questions and all were discussed. Follow up in 3 weeks. Encouraged to call with any concerns. Navigation will continue to follow.

## 2020-12-17 ENCOUNTER — HOSPITAL ENCOUNTER (OUTPATIENT)
Dept: INFUSION THERAPY | Age: 80
Discharge: HOME | End: 2020-12-17
Payer: MEDICARE

## 2020-12-17 VITALS
RESPIRATION RATE: 16 BRPM | OXYGEN SATURATION: 96 % | WEIGHT: 138 LBS | TEMPERATURE: 97.4 F | HEART RATE: 69 BPM | SYSTOLIC BLOOD PRESSURE: 128 MMHG | BODY MASS INDEX: 22.96 KG/M2 | DIASTOLIC BLOOD PRESSURE: 78 MMHG

## 2020-12-17 DIAGNOSIS — C34.91 MALIGNANT NEOPLASM OF RIGHT LUNG, UNSPECIFIED PART OF LUNG (HCC): Primary | ICD-10-CM

## 2020-12-17 PROCEDURE — 96411 CHEMO IV PUSH ADDL DRUG: CPT

## 2020-12-17 PROCEDURE — 96417 CHEMO IV INFUS EACH ADDL SEQ: CPT

## 2020-12-17 PROCEDURE — 96367 TX/PROPH/DG ADDL SEQ IV INF: CPT

## 2020-12-17 PROCEDURE — 96413 CHEMO IV INFUSION 1 HR: CPT

## 2020-12-17 PROCEDURE — 74011000258 HC RX REV CODE- 258: Performed by: INTERNAL MEDICINE

## 2020-12-17 PROCEDURE — 74011250636 HC RX REV CODE- 250/636: Performed by: INTERNAL MEDICINE

## 2020-12-17 PROCEDURE — 96375 TX/PRO/DX INJ NEW DRUG ADDON: CPT

## 2020-12-17 RX ORDER — DIPHENHYDRAMINE HYDROCHLORIDE 50 MG/ML
25 INJECTION, SOLUTION INTRAMUSCULAR; INTRAVENOUS AS NEEDED
Status: DISPENSED | OUTPATIENT
Start: 2020-12-17 | End: 2020-12-17

## 2020-12-17 RX ORDER — ONDANSETRON 2 MG/ML
8 INJECTION INTRAMUSCULAR; INTRAVENOUS ONCE
Status: COMPLETED | OUTPATIENT
Start: 2020-12-17 | End: 2020-12-17

## 2020-12-17 RX ORDER — SODIUM CHLORIDE 9 MG/ML
25 INJECTION, SOLUTION INTRAVENOUS CONTINUOUS
Status: ACTIVE | OUTPATIENT
Start: 2020-12-17 | End: 2020-12-17

## 2020-12-17 RX ORDER — ONDANSETRON 2 MG/ML
8 INJECTION INTRAMUSCULAR; INTRAVENOUS AS NEEDED
Status: ACTIVE | OUTPATIENT
Start: 2020-12-17 | End: 2020-12-17

## 2020-12-17 RX ORDER — ACETAMINOPHEN 325 MG/1
650 TABLET ORAL AS NEEDED
Status: ACTIVE | OUTPATIENT
Start: 2020-12-17 | End: 2020-12-17

## 2020-12-17 RX ORDER — SODIUM CHLORIDE 0.9 % (FLUSH) 0.9 %
10 SYRINGE (ML) INJECTION AS NEEDED
Status: ACTIVE | OUTPATIENT
Start: 2020-12-17 | End: 2020-12-17

## 2020-12-17 RX ADMIN — ONDANSETRON 8 MG: 2 INJECTION INTRAMUSCULAR; INTRAVENOUS at 13:35

## 2020-12-17 RX ADMIN — SODIUM CHLORIDE 200 MG: 900 INJECTION, SOLUTION INTRAVENOUS at 11:59

## 2020-12-17 RX ADMIN — CARBOPLATIN 447 MG: 10 INJECTION, SOLUTION INTRAVENOUS at 14:29

## 2020-12-17 RX ADMIN — SODIUM CHLORIDE 25 ML/HR: 900 INJECTION, SOLUTION INTRAVENOUS at 11:44

## 2020-12-17 RX ADMIN — SODIUM CHLORIDE 150 MG: 900 INJECTION, SOLUTION INTRAVENOUS at 12:40

## 2020-12-17 RX ADMIN — DEXAMETHASONE SODIUM PHOSPHATE 12 MG: 4 INJECTION, SOLUTION INTRAMUSCULAR; INTRAVENOUS at 13:10

## 2020-12-17 RX ADMIN — SODIUM CHLORIDE 850 MG: 900 INJECTION, SOLUTION INTRAVENOUS at 13:40

## 2020-12-17 NOTE — PROGRESS NOTES
Arrived to the [facility]. Cycle 1 Day 1 Keytruda, Alimta, Carboplatin completed. Patient tolerated with no adverse reactions noted. Questions were answered during infusion regarding medications and side effects. Any issues or concerns during appointment: No.  Patient aware of next infusion appointment on 01/07/2020  Discharged home with daughter in stable condition.

## 2021-01-05 ENCOUNTER — PATIENT OUTREACH (OUTPATIENT)
Dept: CASE MANAGEMENT | Age: 81
End: 2021-01-05

## 2021-01-05 ENCOUNTER — HOSPITAL ENCOUNTER (OUTPATIENT)
Dept: LAB | Age: 81
Discharge: HOME | End: 2021-01-05
Payer: MEDICARE

## 2021-01-05 DIAGNOSIS — C34.91 MALIGNANT NEOPLASM OF RIGHT LUNG, UNSPECIFIED PART OF LUNG (HCC): ICD-10-CM

## 2021-01-05 LAB
ALBUMIN SERPL-MCNC: 3.5 G/DL (ref 3.2–4.6)
ALBUMIN/GLOB SERPL: 1.3 {RATIO} (ref 1.2–3.5)
ALP SERPL-CCNC: 77 U/L (ref 50–136)
ALT SERPL-CCNC: 23 U/L (ref 12–65)
ANION GAP SERPL CALC-SCNC: 4 MMOL/L (ref 7–16)
AST SERPL-CCNC: 18 U/L (ref 15–37)
BASOPHILS # BLD: 0 K/UL (ref 0–0.2)
BASOPHILS NFR BLD: 0 % (ref 0–2)
BILIRUB SERPL-MCNC: 0.3 MG/DL (ref 0.2–1.1)
BUN SERPL-MCNC: 10 MG/DL (ref 8–23)
CALCIUM SERPL-MCNC: 8.9 MG/DL (ref 8.3–10.4)
CHLORIDE SERPL-SCNC: 106 MMOL/L (ref 98–107)
CO2 SERPL-SCNC: 29 MMOL/L (ref 21–32)
CREAT SERPL-MCNC: 0.7 MG/DL (ref 0.6–1)
DIFFERENTIAL METHOD BLD: ABNORMAL
EOSINOPHIL # BLD: 0.1 K/UL (ref 0–0.8)
EOSINOPHIL NFR BLD: 4 % (ref 0.5–7.8)
ERYTHROCYTE [DISTWIDTH] IN BLOOD BY AUTOMATED COUNT: 14.3 % (ref 11.9–14.6)
GLOBULIN SER CALC-MCNC: 2.8 G/DL (ref 2.3–3.5)
GLUCOSE SERPL-MCNC: 96 MG/DL (ref 65–100)
HCT VFR BLD AUTO: 35.1 % (ref 35.8–46.3)
HGB BLD-MCNC: 11.4 G/DL (ref 11.7–15.4)
IMM GRANULOCYTES # BLD AUTO: 0 K/UL (ref 0–0.5)
IMM GRANULOCYTES NFR BLD AUTO: 1 % (ref 0–5)
LYMPHOCYTES # BLD: 1 K/UL (ref 0.5–4.6)
LYMPHOCYTES NFR BLD: 28 % (ref 13–44)
MCH RBC QN AUTO: 30 PG (ref 26.1–32.9)
MCHC RBC AUTO-ENTMCNC: 32.5 G/DL (ref 31.4–35)
MCV RBC AUTO: 92.4 FL (ref 79.6–97.8)
MONOCYTES # BLD: 0.5 K/UL (ref 0.1–1.3)
MONOCYTES NFR BLD: 13 % (ref 4–12)
NEUTS SEG # BLD: 2.1 K/UL (ref 1.7–8.2)
NEUTS SEG NFR BLD: 55 % (ref 43–78)
NRBC # BLD: 0 K/UL (ref 0–0.2)
PLATELET # BLD AUTO: 182 K/UL (ref 150–450)
PMV BLD AUTO: 9.5 FL (ref 9.4–12.3)
POTASSIUM SERPL-SCNC: 4.2 MMOL/L (ref 3.5–5.1)
PROT SERPL-MCNC: 6.3 G/DL (ref 6.3–8.2)
RBC # BLD AUTO: 3.8 M/UL (ref 4.05–5.25)
SODIUM SERPL-SCNC: 139 MMOL/L (ref 136–145)
WBC # BLD AUTO: 3.7 K/UL (ref 4.3–11.1)

## 2021-01-05 PROCEDURE — 85025 COMPLETE CBC W/AUTO DIFF WBC: CPT

## 2021-01-05 PROCEDURE — 80053 COMPREHEN METABOLIC PANEL: CPT

## 2021-01-05 PROCEDURE — 36415 COLL VENOUS BLD VENIPUNCTURE: CPT

## 2021-01-05 NOTE — PROGRESS NOTES
1/5/2021  Pt seen with Jesus MCMILLAN follow up lung cancer. Due for C2 carbo/alimta/keytruda today. Was having fatigue, better now, appetite good. Also having issues with sleeping, Ativan not helping, will try Restoril. Continue to Infusion for treatment. Encouraged to call with any questions/concerns. Navigation will continue to follow.

## 2021-01-07 ENCOUNTER — HOSPITAL ENCOUNTER (OUTPATIENT)
Dept: INFUSION THERAPY | Age: 81
Discharge: HOME | End: 2021-01-07
Payer: MEDICARE

## 2021-01-07 VITALS
DIASTOLIC BLOOD PRESSURE: 73 MMHG | TEMPERATURE: 97.4 F | RESPIRATION RATE: 18 BRPM | SYSTOLIC BLOOD PRESSURE: 145 MMHG | OXYGEN SATURATION: 97 % | HEART RATE: 90 BPM

## 2021-01-07 DIAGNOSIS — C34.91 MALIGNANT NEOPLASM OF RIGHT LUNG, UNSPECIFIED PART OF LUNG (HCC): Primary | ICD-10-CM

## 2021-01-07 PROCEDURE — 96413 CHEMO IV INFUSION 1 HR: CPT

## 2021-01-07 PROCEDURE — 96367 TX/PROPH/DG ADDL SEQ IV INF: CPT

## 2021-01-07 PROCEDURE — 96375 TX/PRO/DX INJ NEW DRUG ADDON: CPT

## 2021-01-07 PROCEDURE — 96411 CHEMO IV PUSH ADDL DRUG: CPT

## 2021-01-07 PROCEDURE — 74011000258 HC RX REV CODE- 258: Performed by: NURSE PRACTITIONER

## 2021-01-07 PROCEDURE — 96417 CHEMO IV INFUS EACH ADDL SEQ: CPT

## 2021-01-07 PROCEDURE — 74011250636 HC RX REV CODE- 250/636: Performed by: NURSE PRACTITIONER

## 2021-01-07 RX ORDER — ONDANSETRON 2 MG/ML
8 INJECTION INTRAMUSCULAR; INTRAVENOUS ONCE
Status: COMPLETED | OUTPATIENT
Start: 2021-01-07 | End: 2021-01-07

## 2021-01-07 RX ORDER — SODIUM CHLORIDE 9 MG/ML
25 INJECTION, SOLUTION INTRAVENOUS CONTINUOUS
Status: ACTIVE | OUTPATIENT
Start: 2021-01-07 | End: 2021-01-07

## 2021-01-07 RX ORDER — SODIUM CHLORIDE 0.9 % (FLUSH) 0.9 %
10 SYRINGE (ML) INJECTION AS NEEDED
Status: ACTIVE | OUTPATIENT
Start: 2021-01-07 | End: 2021-01-07

## 2021-01-07 RX ADMIN — SODIUM CHLORIDE 150 MG: 900 INJECTION, SOLUTION INTRAVENOUS at 13:12

## 2021-01-07 RX ADMIN — ONDANSETRON 8 MG: 2 INJECTION INTRAMUSCULAR; INTRAVENOUS at 12:56

## 2021-01-07 RX ADMIN — Medication 10 ML: at 15:02

## 2021-01-07 RX ADMIN — Medication 10 ML: at 11:40

## 2021-01-07 RX ADMIN — DEXAMETHASONE SODIUM PHOSPHATE 12 MG: 4 INJECTION, SOLUTION INTRAMUSCULAR; INTRAVENOUS at 12:57

## 2021-01-07 RX ADMIN — CARBOPLATIN 440 MG: 10 INJECTION, SOLUTION INTRAVENOUS at 14:28

## 2021-01-07 RX ADMIN — SODIUM CHLORIDE 200 MG: 900 INJECTION, SOLUTION INTRAVENOUS at 12:25

## 2021-01-07 RX ADMIN — SODIUM CHLORIDE 850 MG: 900 INJECTION, SOLUTION INTRAVENOUS at 13:47

## 2021-01-07 RX ADMIN — SODIUM CHLORIDE 25 ML/HR: 900 INJECTION, SOLUTION INTRAVENOUS at 11:55

## 2021-01-21 ENCOUNTER — HOSPITAL ENCOUNTER (OUTPATIENT)
Dept: LAB | Age: 81
Discharge: HOME | End: 2021-01-21
Payer: MEDICARE

## 2021-01-21 DIAGNOSIS — R35.0 URINARY FREQUENCY: ICD-10-CM

## 2021-01-21 DIAGNOSIS — C34.91 MALIGNANT NEOPLASM OF RIGHT LUNG, UNSPECIFIED PART OF LUNG (HCC): ICD-10-CM

## 2021-01-21 LAB
APPEARANCE UR: CLEAR
BACTERIA URNS QL MICRO: ABNORMAL /HPF
BILIRUB UR QL: NEGATIVE
CASTS URNS QL MICRO: 0 /LPF
COLOR UR: YELLOW
CRYSTALS URNS QL MICRO: 0 /LPF
EPI CELLS #/AREA URNS HPF: 0 /HPF
GLUCOSE UR STRIP.AUTO-MCNC: NEGATIVE MG/DL
HGB UR QL STRIP: ABNORMAL
KETONES UR QL STRIP.AUTO: NEGATIVE MG/DL
LEUKOCYTE ESTERASE UR QL STRIP.AUTO: ABNORMAL
MUCOUS THREADS URNS QL MICRO: 0 /LPF
NITRITE UR QL STRIP.AUTO: NEGATIVE
PH UR STRIP: 6 [PH] (ref 5–9)
PROT UR STRIP-MCNC: NEGATIVE MG/DL
RBC #/AREA URNS HPF: ABNORMAL /HPF
SP GR UR REFRACTOMETRY: 1.01 (ref 1–1.02)
UROBILINOGEN UR QL STRIP.AUTO: 0.2 EU/DL (ref 0.2–1)
WBC URNS QL MICRO: ABNORMAL /HPF

## 2021-01-21 PROCEDURE — 87186 SC STD MICRODIL/AGAR DIL: CPT

## 2021-01-21 PROCEDURE — 81003 URINALYSIS AUTO W/O SCOPE: CPT

## 2021-01-21 PROCEDURE — 87086 URINE CULTURE/COLONY COUNT: CPT

## 2021-01-21 PROCEDURE — 87088 URINE BACTERIA CULTURE: CPT

## 2021-01-21 PROCEDURE — 81015 MICROSCOPIC EXAM OF URINE: CPT

## 2021-01-24 LAB
BACTERIA SPEC CULT: ABNORMAL
SERVICE CMNT-IMP: ABNORMAL

## 2021-01-26 ENCOUNTER — HOSPITAL ENCOUNTER (OUTPATIENT)
Dept: LAB | Age: 81
Discharge: HOME | End: 2021-01-26
Payer: MEDICARE

## 2021-01-26 ENCOUNTER — PATIENT OUTREACH (OUTPATIENT)
Dept: CASE MANAGEMENT | Age: 81
End: 2021-01-26

## 2021-01-26 DIAGNOSIS — C34.91 MALIGNANT NEOPLASM OF RIGHT LUNG, UNSPECIFIED PART OF LUNG (HCC): ICD-10-CM

## 2021-01-26 LAB
ALBUMIN SERPL-MCNC: 3.9 G/DL (ref 3.2–4.6)
ALBUMIN/GLOB SERPL: 1.4 {RATIO} (ref 1.2–3.5)
ALP SERPL-CCNC: 90 U/L (ref 50–136)
ALT SERPL-CCNC: 31 U/L (ref 12–65)
ANION GAP SERPL CALC-SCNC: 3 MMOL/L (ref 7–16)
AST SERPL-CCNC: 27 U/L (ref 15–37)
BASOPHILS # BLD: 0 K/UL (ref 0–0.2)
BASOPHILS NFR BLD: 1 % (ref 0–2)
BILIRUB SERPL-MCNC: 0.3 MG/DL (ref 0.2–1.1)
BUN SERPL-MCNC: 11 MG/DL (ref 8–23)
CALCIUM SERPL-MCNC: 8.9 MG/DL (ref 8.3–10.4)
CHLORIDE SERPL-SCNC: 103 MMOL/L (ref 98–107)
CO2 SERPL-SCNC: 31 MMOL/L (ref 21–32)
CREAT SERPL-MCNC: 0.8 MG/DL (ref 0.6–1)
DIFFERENTIAL METHOD BLD: ABNORMAL
EOSINOPHIL # BLD: 0.1 K/UL (ref 0–0.8)
EOSINOPHIL NFR BLD: 2 % (ref 0.5–7.8)
ERYTHROCYTE [DISTWIDTH] IN BLOOD BY AUTOMATED COUNT: 15.9 % (ref 11.9–14.6)
GLOBULIN SER CALC-MCNC: 2.7 G/DL (ref 2.3–3.5)
GLUCOSE SERPL-MCNC: 83 MG/DL (ref 65–100)
HCT VFR BLD AUTO: 36.4 % (ref 35.8–46.3)
HGB BLD-MCNC: 11.5 G/DL (ref 11.7–15.4)
IMM GRANULOCYTES # BLD AUTO: 0 K/UL (ref 0–0.5)
IMM GRANULOCYTES NFR BLD AUTO: 1 % (ref 0–5)
LYMPHOCYTES # BLD: 1 K/UL (ref 0.5–4.6)
LYMPHOCYTES NFR BLD: 25 % (ref 13–44)
MCH RBC QN AUTO: 29.6 PG (ref 26.1–32.9)
MCHC RBC AUTO-ENTMCNC: 31.6 G/DL (ref 31.4–35)
MCV RBC AUTO: 93.6 FL (ref 79.6–97.8)
MONOCYTES # BLD: 0.6 K/UL (ref 0.1–1.3)
MONOCYTES NFR BLD: 15 % (ref 4–12)
NEUTS SEG # BLD: 2.4 K/UL (ref 1.7–8.2)
NEUTS SEG NFR BLD: 56 % (ref 43–78)
NRBC # BLD: 0 K/UL (ref 0–0.2)
PLATELET # BLD AUTO: 221 K/UL (ref 150–450)
PMV BLD AUTO: 9.6 FL (ref 9.4–12.3)
POTASSIUM SERPL-SCNC: 3.9 MMOL/L (ref 3.5–5.1)
PROT SERPL-MCNC: 6.6 G/DL (ref 6.3–8.2)
RBC # BLD AUTO: 3.89 M/UL (ref 4.05–5.25)
SODIUM SERPL-SCNC: 137 MMOL/L (ref 136–145)
WBC # BLD AUTO: 4.1 K/UL (ref 4.3–11.1)

## 2021-01-26 PROCEDURE — 85025 COMPLETE CBC W/AUTO DIFF WBC: CPT

## 2021-01-26 PROCEDURE — 36415 COLL VENOUS BLD VENIPUNCTURE: CPT

## 2021-01-26 PROCEDURE — 80053 COMPREHEN METABOLIC PANEL: CPT

## 2021-01-26 PROCEDURE — 84443 ASSAY THYROID STIM HORMONE: CPT

## 2021-01-26 NOTE — PROGRESS NOTES
1/26/2021  Patient seen today with Dr Tamra Sargent for pre-treatment for C 3 Carbo/Alimta/Keytruda. Questions and discussion completed to the satisfaction of the patient. Patient informed it was alright to take bactrim while on treatment. Additional four day prescription for bactrim and a new Ambien prescription sent to the pharmacy of choice. Patient informed that that repeat scans will be completed after the fourth treatment. Patient wishes to continue on treatment plan and navigation will follow.

## 2021-01-28 ENCOUNTER — HOSPITAL ENCOUNTER (OUTPATIENT)
Dept: INFUSION THERAPY | Age: 81
Discharge: HOME | End: 2021-01-28
Payer: MEDICARE

## 2021-01-28 VITALS
DIASTOLIC BLOOD PRESSURE: 66 MMHG | RESPIRATION RATE: 18 BRPM | TEMPERATURE: 97 F | WEIGHT: 135 LBS | BODY MASS INDEX: 22.47 KG/M2 | HEART RATE: 83 BPM | OXYGEN SATURATION: 98 % | SYSTOLIC BLOOD PRESSURE: 122 MMHG

## 2021-01-28 DIAGNOSIS — C34.91 MALIGNANT NEOPLASM OF RIGHT LUNG, UNSPECIFIED PART OF LUNG (HCC): Primary | ICD-10-CM

## 2021-01-28 LAB — TSH SERPL DL<=0.005 MIU/L-ACNC: 1.3 UIU/ML (ref 0.36–3.74)

## 2021-01-28 PROCEDURE — 74011000258 HC RX REV CODE- 258: Performed by: INTERNAL MEDICINE

## 2021-01-28 PROCEDURE — 74011250636 HC RX REV CODE- 250/636: Performed by: INTERNAL MEDICINE

## 2021-01-28 PROCEDURE — 96367 TX/PROPH/DG ADDL SEQ IV INF: CPT

## 2021-01-28 PROCEDURE — 96375 TX/PRO/DX INJ NEW DRUG ADDON: CPT

## 2021-01-28 PROCEDURE — 96413 CHEMO IV INFUSION 1 HR: CPT

## 2021-01-28 PROCEDURE — 96372 THER/PROPH/DIAG INJ SC/IM: CPT

## 2021-01-28 PROCEDURE — 96417 CHEMO IV INFUS EACH ADDL SEQ: CPT

## 2021-01-28 RX ORDER — SODIUM CHLORIDE 9 MG/ML
25 INJECTION, SOLUTION INTRAVENOUS CONTINUOUS
Status: ACTIVE | OUTPATIENT
Start: 2021-01-28 | End: 2021-01-28

## 2021-01-28 RX ORDER — SODIUM CHLORIDE 0.9 % (FLUSH) 0.9 %
10 SYRINGE (ML) INJECTION AS NEEDED
Status: ACTIVE | OUTPATIENT
Start: 2021-01-28 | End: 2021-01-28

## 2021-01-28 RX ORDER — CYANOCOBALAMIN 1000 UG/ML
1000 INJECTION, SOLUTION INTRAMUSCULAR; SUBCUTANEOUS ONCE
Status: COMPLETED | OUTPATIENT
Start: 2021-01-28 | End: 2021-01-28

## 2021-01-28 RX ORDER — ONDANSETRON 2 MG/ML
8 INJECTION INTRAMUSCULAR; INTRAVENOUS ONCE
Status: COMPLETED | OUTPATIENT
Start: 2021-01-28 | End: 2021-01-28

## 2021-01-28 RX ADMIN — SODIUM CHLORIDE 200 MG: 9 INJECTION, SOLUTION INTRAVENOUS at 12:08

## 2021-01-28 RX ADMIN — CARBOPLATIN 401 MG: 10 INJECTION, SOLUTION INTRAVENOUS at 14:28

## 2021-01-28 RX ADMIN — Medication 10 ML: at 15:10

## 2021-01-28 RX ADMIN — SODIUM CHLORIDE 850 MG: 9 INJECTION, SOLUTION INTRAVENOUS at 13:35

## 2021-01-28 RX ADMIN — ONDANSETRON 8 MG: 2 INJECTION INTRAMUSCULAR; INTRAVENOUS at 12:42

## 2021-01-28 RX ADMIN — SODIUM CHLORIDE 150 MG: 900 INJECTION, SOLUTION INTRAVENOUS at 13:05

## 2021-01-28 RX ADMIN — SODIUM CHLORIDE 25 ML/HR: 900 INJECTION, SOLUTION INTRAVENOUS at 11:40

## 2021-01-28 RX ADMIN — Medication 10 ML: at 11:40

## 2021-01-28 RX ADMIN — DEXAMETHASONE SODIUM PHOSPHATE 12 MG: 4 INJECTION, SOLUTION INTRAMUSCULAR; INTRAVENOUS at 12:43

## 2021-01-28 RX ADMIN — CYANOCOBALAMIN 1000 MCG: 1000 INJECTION, SOLUTION INTRAMUSCULAR; SUBCUTANEOUS at 13:07

## 2021-01-28 NOTE — PROGRESS NOTES
Report from [facility], [facility] completed, pt tolerated well, discharged home ambulatory

## 2021-02-16 ENCOUNTER — HOSPITAL ENCOUNTER (OUTPATIENT)
Dept: LAB | Age: 81
Discharge: HOME | End: 2021-02-16
Payer: MEDICARE

## 2021-02-16 ENCOUNTER — PATIENT OUTREACH (OUTPATIENT)
Dept: CASE MANAGEMENT | Age: 81
End: 2021-02-16

## 2021-02-16 DIAGNOSIS — C34.91 MALIGNANT NEOPLASM OF RIGHT LUNG, UNSPECIFIED PART OF LUNG (HCC): ICD-10-CM

## 2021-02-16 LAB
ALBUMIN SERPL-MCNC: 3.8 G/DL (ref 3.2–4.6)
ALBUMIN/GLOB SERPL: 1.4 {RATIO} (ref 1.2–3.5)
ALP SERPL-CCNC: 85 U/L (ref 50–136)
ALT SERPL-CCNC: 25 U/L (ref 12–65)
ANION GAP SERPL CALC-SCNC: 6 MMOL/L (ref 7–16)
AST SERPL-CCNC: 17 U/L (ref 15–37)
BASOPHILS # BLD: 0 K/UL (ref 0–0.2)
BASOPHILS NFR BLD: 1 % (ref 0–2)
BILIRUB SERPL-MCNC: 0.3 MG/DL (ref 0.2–1.1)
BUN SERPL-MCNC: 9 MG/DL (ref 8–23)
CALCIUM SERPL-MCNC: 8.9 MG/DL (ref 8.3–10.4)
CHLORIDE SERPL-SCNC: 102 MMOL/L (ref 98–107)
CO2 SERPL-SCNC: 31 MMOL/L (ref 21–32)
CREAT SERPL-MCNC: 0.6 MG/DL (ref 0.6–1)
DIFFERENTIAL METHOD BLD: ABNORMAL
EOSINOPHIL # BLD: 0.1 K/UL (ref 0–0.8)
EOSINOPHIL NFR BLD: 2 % (ref 0.5–7.8)
ERYTHROCYTE [DISTWIDTH] IN BLOOD BY AUTOMATED COUNT: 19.2 % (ref 11.9–14.6)
GLOBULIN SER CALC-MCNC: 2.8 G/DL (ref 2.3–3.5)
GLUCOSE SERPL-MCNC: 94 MG/DL (ref 65–100)
HCT VFR BLD AUTO: 36 % (ref 35.8–46.3)
HGB BLD-MCNC: 11.3 G/DL (ref 11.7–15.4)
IMM GRANULOCYTES # BLD AUTO: 0 K/UL (ref 0–0.5)
IMM GRANULOCYTES NFR BLD AUTO: 1 % (ref 0–5)
LYMPHOCYTES # BLD: 0.9 K/UL (ref 0.5–4.6)
LYMPHOCYTES NFR BLD: 20 % (ref 13–44)
MCH RBC QN AUTO: 29.6 PG (ref 26.1–32.9)
MCHC RBC AUTO-ENTMCNC: 31.4 G/DL (ref 31.4–35)
MCV RBC AUTO: 94.2 FL (ref 79.6–97.8)
MONOCYTES # BLD: 0.6 K/UL (ref 0.1–1.3)
MONOCYTES NFR BLD: 15 % (ref 4–12)
NEUTS SEG # BLD: 2.7 K/UL (ref 1.7–8.2)
NEUTS SEG NFR BLD: 62 % (ref 43–78)
NRBC # BLD: 0 K/UL (ref 0–0.2)
PLATELET # BLD AUTO: 219 K/UL (ref 150–450)
PMV BLD AUTO: 9.4 FL (ref 9.4–12.3)
POTASSIUM SERPL-SCNC: 4.1 MMOL/L (ref 3.5–5.1)
PROT SERPL-MCNC: 6.6 G/DL (ref 6.3–8.2)
RBC # BLD AUTO: 3.82 M/UL (ref 4.05–5.25)
SODIUM SERPL-SCNC: 139 MMOL/L (ref 136–145)
WBC # BLD AUTO: 4.4 K/UL (ref 4.3–11.1)

## 2021-02-16 PROCEDURE — 36415 COLL VENOUS BLD VENIPUNCTURE: CPT

## 2021-02-16 PROCEDURE — 85025 COMPLETE CBC W/AUTO DIFF WBC: CPT

## 2021-02-16 PROCEDURE — 80053 COMPREHEN METABOLIC PANEL: CPT

## 2021-02-16 NOTE — PROGRESS NOTES
2/16/21 saw pt today with Steffany Aragon NP for pre chemo cycle 4 carbo/alimta/keytruda. She is reporting some fatigue. PO intake is good. Infusion on 2/18. Follow up in 3 weeks with restaging scan. Encouraged to call with any concerns. Navigation will continue to follow.

## 2021-02-18 ENCOUNTER — HOSPITAL ENCOUNTER (OUTPATIENT)
Dept: INFUSION THERAPY | Age: 81
Discharge: HOME | End: 2021-02-18
Payer: MEDICARE

## 2021-02-18 VITALS
HEART RATE: 81 BPM | HEIGHT: 65 IN | OXYGEN SATURATION: 95 % | WEIGHT: 135.6 LBS | DIASTOLIC BLOOD PRESSURE: 72 MMHG | TEMPERATURE: 97 F | RESPIRATION RATE: 16 BRPM | SYSTOLIC BLOOD PRESSURE: 138 MMHG | BODY MASS INDEX: 22.59 KG/M2

## 2021-02-18 DIAGNOSIS — C34.91 MALIGNANT NEOPLASM OF RIGHT LUNG, UNSPECIFIED PART OF LUNG (HCC): Primary | ICD-10-CM

## 2021-02-18 PROCEDURE — 96411 CHEMO IV PUSH ADDL DRUG: CPT

## 2021-02-18 PROCEDURE — 74011250636 HC RX REV CODE- 250/636: Performed by: NURSE PRACTITIONER

## 2021-02-18 PROCEDURE — 96367 TX/PROPH/DG ADDL SEQ IV INF: CPT

## 2021-02-18 PROCEDURE — 74011000258 HC RX REV CODE- 258: Performed by: NURSE PRACTITIONER

## 2021-02-18 PROCEDURE — 96413 CHEMO IV INFUSION 1 HR: CPT

## 2021-02-18 PROCEDURE — 96375 TX/PRO/DX INJ NEW DRUG ADDON: CPT

## 2021-02-18 PROCEDURE — 96417 CHEMO IV INFUS EACH ADDL SEQ: CPT

## 2021-02-18 RX ORDER — SODIUM CHLORIDE 9 MG/ML
25 INJECTION, SOLUTION INTRAVENOUS CONTINUOUS
Status: ACTIVE | OUTPATIENT
Start: 2021-02-18 | End: 2021-02-18

## 2021-02-18 RX ORDER — ONDANSETRON 2 MG/ML
8 INJECTION INTRAMUSCULAR; INTRAVENOUS ONCE
Status: COMPLETED | OUTPATIENT
Start: 2021-02-18 | End: 2021-02-18

## 2021-02-18 RX ORDER — SODIUM CHLORIDE 0.9 % (FLUSH) 0.9 %
10 SYRINGE (ML) INJECTION AS NEEDED
Status: ACTIVE | OUTPATIENT
Start: 2021-02-18 | End: 2021-02-18

## 2021-02-18 RX ADMIN — SODIUM CHLORIDE 200 MG: 9 INJECTION, SOLUTION INTRAVENOUS at 12:03

## 2021-02-18 RX ADMIN — SODIUM CHLORIDE 25 ML/HR: 900 INJECTION, SOLUTION INTRAVENOUS at 11:45

## 2021-02-18 RX ADMIN — DEXAMETHASONE SODIUM PHOSPHATE 12 MG: 4 INJECTION, SOLUTION INTRAMUSCULAR; INTRAVENOUS at 12:39

## 2021-02-18 RX ADMIN — Medication 10 ML: at 14:45

## 2021-02-18 RX ADMIN — CARBOPLATIN 494 MG: 10 INJECTION, SOLUTION INTRAVENOUS at 14:08

## 2021-02-18 RX ADMIN — Medication 10 ML: at 11:51

## 2021-02-18 RX ADMIN — SODIUM CHLORIDE 850 MG: 9 INJECTION, SOLUTION INTRAVENOUS at 13:22

## 2021-02-18 RX ADMIN — ONDANSETRON 8 MG: 2 INJECTION INTRAMUSCULAR; INTRAVENOUS at 12:38

## 2021-02-18 RX ADMIN — SODIUM CHLORIDE 150 MG: 900 INJECTION, SOLUTION INTRAVENOUS at 12:55

## 2021-02-18 NOTE — PROGRESS NOTES
Arrived to the [facility]. Chemo completed. Patient tolerated well. Any issues or concerns during appointment: None. Patient aware of next infusion appointment on 3/11 (date) at 36 (time). Discharged ambulatory in stable condition.

## 2021-03-04 ENCOUNTER — HOSPITAL ENCOUNTER (OUTPATIENT)
Dept: CT IMAGING | Age: 81
Discharge: HOME | End: 2021-03-04
Attending: INTERNAL MEDICINE

## 2021-03-04 DIAGNOSIS — C34.91 MALIGNANT NEOPLASM OF RIGHT LUNG, UNSPECIFIED PART OF LUNG (HCC): ICD-10-CM

## 2021-03-04 RX ORDER — SODIUM CHLORIDE 0.9 % (FLUSH) 0.9 %
10 SYRINGE (ML) INJECTION
Status: ACTIVE | OUTPATIENT
Start: 2021-03-04 | End: 2021-03-04

## 2021-03-04 RX ORDER — SODIUM CHLORIDE 0.9 % (FLUSH) 0.9 %
10 SYRINGE (ML) INJECTION
Status: DISCONTINUED | OUTPATIENT
Start: 2021-03-04 | End: 2021-03-04

## 2021-03-09 ENCOUNTER — APPOINTMENT (OUTPATIENT)
Dept: INFUSION THERAPY | Age: 81
End: 2021-03-09

## 2021-03-09 ENCOUNTER — PATIENT OUTREACH (OUTPATIENT)
Dept: CASE MANAGEMENT | Age: 81
End: 2021-03-09

## 2021-03-09 ENCOUNTER — HOSPITAL ENCOUNTER (OUTPATIENT)
Dept: LAB | Age: 81
Discharge: HOME | End: 2021-03-09
Payer: MEDICARE

## 2021-03-09 DIAGNOSIS — C34.91 MALIGNANT NEOPLASM OF RIGHT LUNG, UNSPECIFIED PART OF LUNG (HCC): ICD-10-CM

## 2021-03-09 LAB
ALBUMIN SERPL-MCNC: 3.7 G/DL (ref 3.2–4.6)
ALBUMIN/GLOB SERPL: 1.3 {RATIO} (ref 1.2–3.5)
ALP SERPL-CCNC: 84 U/L (ref 50–136)
ALT SERPL-CCNC: 42 U/L (ref 12–65)
ANION GAP SERPL CALC-SCNC: 4 MMOL/L (ref 7–16)
AST SERPL-CCNC: 27 U/L (ref 15–37)
BASOPHILS # BLD: 0 K/UL (ref 0–0.2)
BASOPHILS NFR BLD: 0 % (ref 0–2)
BILIRUB SERPL-MCNC: 0.4 MG/DL (ref 0.2–1.1)
BUN SERPL-MCNC: 9 MG/DL (ref 8–23)
CALCIUM SERPL-MCNC: 8.9 MG/DL (ref 8.3–10.4)
CHLORIDE SERPL-SCNC: 104 MMOL/L (ref 98–107)
CO2 SERPL-SCNC: 30 MMOL/L (ref 21–32)
CREAT SERPL-MCNC: 0.6 MG/DL (ref 0.6–1)
DIFFERENTIAL METHOD BLD: ABNORMAL
EOSINOPHIL # BLD: 0 K/UL (ref 0–0.8)
EOSINOPHIL NFR BLD: 1 % (ref 0.5–7.8)
ERYTHROCYTE [DISTWIDTH] IN BLOOD BY AUTOMATED COUNT: 21.2 % (ref 11.9–14.6)
GLOBULIN SER CALC-MCNC: 2.8 G/DL (ref 2.3–3.5)
GLUCOSE SERPL-MCNC: 107 MG/DL (ref 65–100)
HCT VFR BLD AUTO: 32.7 % (ref 35.8–46.3)
HGB BLD-MCNC: 10.4 G/DL (ref 11.7–15.4)
IMM GRANULOCYTES # BLD AUTO: 0 K/UL (ref 0–0.5)
IMM GRANULOCYTES NFR BLD AUTO: 1 % (ref 0–5)
LYMPHOCYTES # BLD: 0.9 K/UL (ref 0.5–4.6)
LYMPHOCYTES NFR BLD: 25 % (ref 13–44)
MCH RBC QN AUTO: 30.8 PG (ref 26.1–32.9)
MCHC RBC AUTO-ENTMCNC: 31.8 G/DL (ref 31.4–35)
MCV RBC AUTO: 96.7 FL (ref 79.6–97.8)
MONOCYTES # BLD: 0.7 K/UL (ref 0.1–1.3)
MONOCYTES NFR BLD: 19 % (ref 4–12)
NEUTS SEG # BLD: 2 K/UL (ref 1.7–8.2)
NEUTS SEG NFR BLD: 54 % (ref 43–78)
NRBC # BLD: 0 K/UL (ref 0–0.2)
PLATELET # BLD AUTO: 205 K/UL (ref 150–450)
PMV BLD AUTO: 9.3 FL (ref 9.4–12.3)
POTASSIUM SERPL-SCNC: 4 MMOL/L (ref 3.5–5.1)
PROT SERPL-MCNC: 6.5 G/DL (ref 6.3–8.2)
RBC # BLD AUTO: 3.38 M/UL (ref 4.05–5.25)
SODIUM SERPL-SCNC: 138 MMOL/L (ref 136–145)
WBC # BLD AUTO: 3.7 K/UL (ref 4.3–11.1)

## 2021-03-09 PROCEDURE — 36415 COLL VENOUS BLD VENIPUNCTURE: CPT

## 2021-03-09 PROCEDURE — 85025 COMPLETE CBC W/AUTO DIFF WBC: CPT

## 2021-03-09 PROCEDURE — 80053 COMPREHEN METABOLIC PANEL: CPT

## 2021-03-09 NOTE — PROGRESS NOTES
3/9/21 saw pt today with Dr. Sincere Elizabeth for pre chemo cycle 5 keytruda and restaging scan review. Scan shows response to therapy. Discussed proceeding with alimta/keytruda or just [facility] also. Pt would like to proceed with [facility] only. Instructed to stop decadron and folic acid. PO intake is good. Infusion 3/11. Follow up in 3 weeks. Encouraged to call with any concerns. Navigation will continue to follow.

## 2021-03-11 ENCOUNTER — HOSPITAL ENCOUNTER (OUTPATIENT)
Dept: INFUSION THERAPY | Age: 81
Discharge: HOME | End: 2021-03-11
Payer: MEDICARE

## 2021-03-11 VITALS
DIASTOLIC BLOOD PRESSURE: 72 MMHG | WEIGHT: 135.8 LBS | BODY MASS INDEX: 22.6 KG/M2 | RESPIRATION RATE: 18 BRPM | OXYGEN SATURATION: 96 % | HEART RATE: 87 BPM | TEMPERATURE: 98 F | SYSTOLIC BLOOD PRESSURE: 141 MMHG

## 2021-03-11 DIAGNOSIS — C34.91 MALIGNANT NEOPLASM OF RIGHT LUNG, UNSPECIFIED PART OF LUNG (HCC): Primary | ICD-10-CM

## 2021-03-11 PROCEDURE — 74011250636 HC RX REV CODE- 250/636: Performed by: INTERNAL MEDICINE

## 2021-03-11 PROCEDURE — 74011000258 HC RX REV CODE- 258: Performed by: INTERNAL MEDICINE

## 2021-03-11 PROCEDURE — 96413 CHEMO IV INFUSION 1 HR: CPT

## 2021-03-11 RX ORDER — SODIUM CHLORIDE 9 MG/ML
25 INJECTION, SOLUTION INTRAVENOUS CONTINUOUS
Status: ACTIVE | OUTPATIENT
Start: 2021-03-11 | End: 2021-03-11

## 2021-03-11 RX ORDER — SODIUM CHLORIDE 0.9 % (FLUSH) 0.9 %
10 SYRINGE (ML) INJECTION AS NEEDED
Status: ACTIVE | OUTPATIENT
Start: 2021-03-11 | End: 2021-03-11

## 2021-03-11 RX ADMIN — Medication 10 ML: at 11:30

## 2021-03-11 RX ADMIN — Medication 10 ML: at 12:24

## 2021-03-11 RX ADMIN — SODIUM CHLORIDE 200 MG: 900 INJECTION, SOLUTION INTRAVENOUS at 11:47

## 2021-03-11 RX ADMIN — SODIUM CHLORIDE 25 ML/HR: 900 INJECTION, SOLUTION INTRAVENOUS at 11:31

## 2021-03-11 NOTE — PROGRESS NOTES
Pt arrived ambulatory to C. Port accessed with good blood return. NS infusing. Keytruda infused. Pt aware of next appt on 4/2/21 at 1130. Port flushed and de accessed. Pt discharged ambulatory.

## 2021-03-11 NOTE — ADDENDUM NOTE
Encounter addended by: Frank Rojo [facility] on: 3/11/2021 11:31 AM   Actions taken: i-Vent created or edited

## 2021-03-30 ENCOUNTER — HOSPITAL ENCOUNTER (OUTPATIENT)
Dept: LAB | Age: 81
Discharge: HOME | End: 2021-03-30
Payer: MEDICARE

## 2021-03-30 ENCOUNTER — PATIENT OUTREACH (OUTPATIENT)
Dept: CASE MANAGEMENT | Age: 81
End: 2021-03-30

## 2021-03-30 DIAGNOSIS — C34.91 MALIGNANT NEOPLASM OF RIGHT LUNG, UNSPECIFIED PART OF LUNG (HCC): ICD-10-CM

## 2021-03-30 DIAGNOSIS — Z79.899 HIGH RISK MEDICATION USE: ICD-10-CM

## 2021-03-30 LAB
ALBUMIN SERPL-MCNC: 3.7 G/DL (ref 3.2–4.6)
ALBUMIN/GLOB SERPL: 1.4 {RATIO} (ref 1.2–3.5)
ALP SERPL-CCNC: 77 U/L (ref 50–136)
ALT SERPL-CCNC: 17 U/L (ref 12–65)
ANION GAP SERPL CALC-SCNC: 3 MMOL/L (ref 7–16)
AST SERPL-CCNC: 20 U/L (ref 15–37)
BASOPHILS # BLD: 0 K/UL (ref 0–0.2)
BASOPHILS NFR BLD: 0 % (ref 0–2)
BILIRUB SERPL-MCNC: 0.5 MG/DL (ref 0.2–1.1)
BUN SERPL-MCNC: 11 MG/DL (ref 8–23)
CALCIUM SERPL-MCNC: 9.3 MG/DL (ref 8.3–10.4)
CHLORIDE SERPL-SCNC: 104 MMOL/L (ref 98–107)
CO2 SERPL-SCNC: 30 MMOL/L (ref 21–32)
CREAT SERPL-MCNC: 0.6 MG/DL (ref 0.6–1)
DIFFERENTIAL METHOD BLD: ABNORMAL
EOSINOPHIL # BLD: 0.1 K/UL (ref 0–0.8)
EOSINOPHIL NFR BLD: 3 % (ref 0.5–7.8)
ERYTHROCYTE [DISTWIDTH] IN BLOOD BY AUTOMATED COUNT: 18.6 % (ref 11.9–14.6)
FERRITIN SERPL-MCNC: 24 NG/ML (ref 8–388)
GLOBULIN SER CALC-MCNC: 2.7 G/DL (ref 2.3–3.5)
GLUCOSE SERPL-MCNC: 90 MG/DL (ref 65–100)
HCT VFR BLD AUTO: 32.9 % (ref 35.8–46.3)
HGB BLD-MCNC: 11 G/DL (ref 11.7–15.4)
IMM GRANULOCYTES # BLD AUTO: 0 K/UL (ref 0–0.5)
IMM GRANULOCYTES NFR BLD AUTO: 1 % (ref 0–5)
IRON SATN MFR SERPL: 20 %
IRON SERPL-MCNC: 77 UG/DL (ref 35–150)
LYMPHOCYTES # BLD: 1.2 K/UL (ref 0.5–4.6)
LYMPHOCYTES NFR BLD: 22 % (ref 13–44)
MCH RBC QN AUTO: 33.3 PG (ref 26.1–32.9)
MCHC RBC AUTO-ENTMCNC: 33.4 G/DL (ref 31.4–35)
MCV RBC AUTO: 99.7 FL (ref 79.6–97.8)
MONOCYTES # BLD: 0.5 K/UL (ref 0.1–1.3)
MONOCYTES NFR BLD: 9 % (ref 4–12)
NEUTS SEG # BLD: 3.6 K/UL (ref 1.7–8.2)
NEUTS SEG NFR BLD: 65 % (ref 43–78)
NRBC # BLD: 0 K/UL (ref 0–0.2)
PLATELET # BLD AUTO: 135 K/UL (ref 150–450)
PMV BLD AUTO: 10.2 FL (ref 9.4–12.3)
POTASSIUM SERPL-SCNC: 4 MMOL/L (ref 3.5–5.1)
PROT SERPL-MCNC: 6.4 G/DL (ref 6.3–8.2)
RBC # BLD AUTO: 3.3 M/UL (ref 4.05–5.2)
SODIUM SERPL-SCNC: 137 MMOL/L (ref 136–145)
T4 FREE SERPL-MCNC: 0.8 NG/DL (ref 0.78–1.46)
TIBC SERPL-MCNC: 382 UG/DL (ref 250–450)
TSH SERPL DL<=0.005 MIU/L-ACNC: 1.21 UIU/ML (ref 0.36–3.74)
WBC # BLD AUTO: 5.6 K/UL (ref 4.3–11.1)

## 2021-03-30 PROCEDURE — 36415 COLL VENOUS BLD VENIPUNCTURE: CPT

## 2021-03-30 PROCEDURE — 80053 COMPREHEN METABOLIC PANEL: CPT

## 2021-03-30 PROCEDURE — 84439 ASSAY OF FREE THYROXINE: CPT

## 2021-03-30 PROCEDURE — 83540 ASSAY OF IRON: CPT

## 2021-03-30 PROCEDURE — 82728 ASSAY OF FERRITIN: CPT

## 2021-03-30 PROCEDURE — 85025 COMPLETE CBC W/AUTO DIFF WBC: CPT

## 2021-03-30 PROCEDURE — 84443 ASSAY THYROID STIM HORMONE: CPT

## 2021-03-30 NOTE — PROGRESS NOTES
3/30/21 saw pt today with Sade Li, NP for pre chemo cycle 6 keytruda. She is tolerating treatment well. PO intake is good. Reporting fatigue, will check iron studies today. Denies any other issues. Follow up in 3 weeks. Encouraged to call with any concerns. Navigation will continue to follow.

## 2021-04-02 ENCOUNTER — HOSPITAL ENCOUNTER (OUTPATIENT)
Dept: INFUSION THERAPY | Age: 81
Discharge: HOME | End: 2021-04-02
Payer: MEDICARE

## 2021-04-02 VITALS
SYSTOLIC BLOOD PRESSURE: 126 MMHG | WEIGHT: 138.2 LBS | RESPIRATION RATE: 16 BRPM | DIASTOLIC BLOOD PRESSURE: 71 MMHG | BODY MASS INDEX: 23 KG/M2 | OXYGEN SATURATION: 98 % | TEMPERATURE: 97.9 F | HEART RATE: 87 BPM

## 2021-04-02 DIAGNOSIS — C34.91 MALIGNANT NEOPLASM OF RIGHT LUNG, UNSPECIFIED PART OF LUNG (HCC): Primary | ICD-10-CM

## 2021-04-02 PROCEDURE — 96413 CHEMO IV INFUSION 1 HR: CPT

## 2021-04-02 PROCEDURE — 74011250636 HC RX REV CODE- 250/636: Performed by: NURSE PRACTITIONER

## 2021-04-02 PROCEDURE — 74011000258 HC RX REV CODE- 258: Performed by: NURSE PRACTITIONER

## 2021-04-02 RX ORDER — SODIUM CHLORIDE 9 MG/ML
25 INJECTION, SOLUTION INTRAVENOUS CONTINUOUS
Status: ACTIVE | OUTPATIENT
Start: 2021-04-02 | End: 2021-04-02

## 2021-04-02 RX ORDER — SODIUM CHLORIDE 0.9 % (FLUSH) 0.9 %
10 SYRINGE (ML) INJECTION AS NEEDED
Status: CANCELLED | OUTPATIENT
Start: 2021-04-02

## 2021-04-02 RX ORDER — ONDANSETRON 2 MG/ML
8 INJECTION INTRAMUSCULAR; INTRAVENOUS AS NEEDED
Status: CANCELLED | OUTPATIENT
Start: 2021-04-02

## 2021-04-02 RX ORDER — ALBUTEROL SULFATE 0.83 MG/ML
2.5 SOLUTION RESPIRATORY (INHALATION) AS NEEDED
Status: CANCELLED
Start: 2021-04-02

## 2021-04-02 RX ORDER — HYDROCORTISONE SODIUM SUCCINATE 100 MG/2ML
100 INJECTION, POWDER, FOR SOLUTION INTRAMUSCULAR; INTRAVENOUS AS NEEDED
Status: CANCELLED | OUTPATIENT
Start: 2021-04-02

## 2021-04-02 RX ORDER — ACETAMINOPHEN 325 MG/1
650 TABLET ORAL AS NEEDED
Status: CANCELLED
Start: 2021-04-02

## 2021-04-02 RX ORDER — HEPARIN 100 UNIT/ML
300-500 SYRINGE INTRAVENOUS AS NEEDED
Status: CANCELLED
Start: 2021-04-02

## 2021-04-02 RX ORDER — EPINEPHRINE 1 MG/ML
0.3 INJECTION, SOLUTION, CONCENTRATE INTRAVENOUS AS NEEDED
Status: CANCELLED | OUTPATIENT
Start: 2021-04-02

## 2021-04-02 RX ORDER — DIPHENHYDRAMINE HYDROCHLORIDE 50 MG/ML
25 INJECTION, SOLUTION INTRAMUSCULAR; INTRAVENOUS AS NEEDED
Status: CANCELLED
Start: 2021-04-02

## 2021-04-02 RX ORDER — DIPHENHYDRAMINE HYDROCHLORIDE 50 MG/ML
50 INJECTION, SOLUTION INTRAMUSCULAR; INTRAVENOUS AS NEEDED
Status: CANCELLED
Start: 2021-04-02

## 2021-04-02 RX ORDER — SODIUM CHLORIDE 9 MG/ML
10 INJECTION INTRAMUSCULAR; INTRAVENOUS; SUBCUTANEOUS AS NEEDED
Status: ACTIVE | OUTPATIENT
Start: 2021-04-02 | End: 2021-04-02

## 2021-04-02 RX ADMIN — SODIUM CHLORIDE 200 MG: 9 INJECTION, SOLUTION INTRAVENOUS at 12:20

## 2021-04-02 RX ADMIN — SODIUM CHLORIDE 10 ML: 9 INJECTION INTRAMUSCULAR; INTRAVENOUS; SUBCUTANEOUS at 13:05

## 2021-04-02 RX ADMIN — SODIUM CHLORIDE 10 ML: 9 INJECTION INTRAMUSCULAR; INTRAVENOUS; SUBCUTANEOUS at 11:45

## 2021-04-02 RX ADMIN — SODIUM CHLORIDE 25 ML/HR: 9 INJECTION, SOLUTION INTRAVENOUS at 11:50

## 2021-04-21 RX ORDER — SODIUM CHLORIDE 0.9 % (FLUSH) 0.9 %
10 SYRINGE (ML) INJECTION AS NEEDED
Status: CANCELLED | OUTPATIENT
Start: 2021-04-21

## 2021-04-22 ENCOUNTER — PATIENT OUTREACH (OUTPATIENT)
Dept: CASE MANAGEMENT | Age: 81
End: 2021-04-22

## 2021-04-22 ENCOUNTER — HOSPITAL ENCOUNTER (OUTPATIENT)
Dept: INFUSION THERAPY | Age: 81
Discharge: HOME | End: 2021-04-22
Payer: MEDICARE

## 2021-04-22 DIAGNOSIS — C34.91 MALIGNANT NEOPLASM OF RIGHT LUNG, UNSPECIFIED PART OF LUNG (HCC): ICD-10-CM

## 2021-04-22 DIAGNOSIS — C34.91 MALIGNANT NEOPLASM OF RIGHT LUNG, UNSPECIFIED PART OF LUNG (HCC): Primary | ICD-10-CM

## 2021-04-22 LAB
ALBUMIN SERPL-MCNC: 3.8 G/DL (ref 3.2–4.6)
ALBUMIN/GLOB SERPL: 1.3 {RATIO} (ref 1.2–3.5)
ALP SERPL-CCNC: 80 U/L (ref 50–136)
ALT SERPL-CCNC: 26 U/L (ref 12–65)
ANION GAP SERPL CALC-SCNC: 6 MMOL/L (ref 7–16)
AST SERPL-CCNC: 23 U/L (ref 15–37)
BASOPHILS # BLD: 0 K/UL (ref 0–0.2)
BASOPHILS NFR BLD: 1 % (ref 0–2)
BILIRUB SERPL-MCNC: 0.3 MG/DL (ref 0.2–1.1)
BUN SERPL-MCNC: 11 MG/DL (ref 8–23)
CALCIUM SERPL-MCNC: 9.3 MG/DL (ref 8.3–10.4)
CHLORIDE SERPL-SCNC: 104 MMOL/L (ref 98–107)
CO2 SERPL-SCNC: 28 MMOL/L (ref 21–32)
CREAT SERPL-MCNC: 0.8 MG/DL (ref 0.6–1)
DIFFERENTIAL METHOD BLD: ABNORMAL
EOSINOPHIL # BLD: 0.1 K/UL (ref 0–0.8)
EOSINOPHIL NFR BLD: 3 % (ref 0.5–7.8)
ERYTHROCYTE [DISTWIDTH] IN BLOOD BY AUTOMATED COUNT: 13.7 % (ref 11.9–14.6)
GLOBULIN SER CALC-MCNC: 3 G/DL (ref 2.3–3.5)
GLUCOSE SERPL-MCNC: 88 MG/DL (ref 65–100)
HCT VFR BLD AUTO: 34.7 % (ref 35.8–46.3)
HGB BLD-MCNC: 11.3 G/DL (ref 11.7–15.4)
IMM GRANULOCYTES # BLD AUTO: 0 K/UL (ref 0–0.5)
IMM GRANULOCYTES NFR BLD AUTO: 1 % (ref 0–5)
LYMPHOCYTES # BLD: 1.1 K/UL (ref 0.5–4.6)
LYMPHOCYTES NFR BLD: 25 % (ref 13–44)
MCH RBC QN AUTO: 33 PG (ref 26.1–32.9)
MCHC RBC AUTO-ENTMCNC: 32.6 G/DL (ref 31.4–35)
MCV RBC AUTO: 101.5 FL (ref 79.6–97.8)
MONOCYTES # BLD: 0.4 K/UL (ref 0.1–1.3)
MONOCYTES NFR BLD: 9 % (ref 4–12)
NEUTS SEG # BLD: 2.7 K/UL (ref 1.7–8.2)
NEUTS SEG NFR BLD: 62 % (ref 43–78)
NRBC # BLD: 0 K/UL (ref 0–0.2)
PLATELET # BLD AUTO: 141 K/UL (ref 150–450)
PMV BLD AUTO: 9.5 FL (ref 9.4–12.3)
POTASSIUM SERPL-SCNC: 3.9 MMOL/L (ref 3.5–5.1)
PROT SERPL-MCNC: 6.8 G/DL (ref 6.3–8.2)
RBC # BLD AUTO: 3.42 M/UL (ref 4.05–5.2)
SODIUM SERPL-SCNC: 138 MMOL/L (ref 136–145)
WBC # BLD AUTO: 4.3 K/UL (ref 4.3–11.1)

## 2021-04-22 PROCEDURE — 74011250636 HC RX REV CODE- 250/636: Performed by: INTERNAL MEDICINE

## 2021-04-22 PROCEDURE — 74011000258 HC RX REV CODE- 258: Performed by: INTERNAL MEDICINE

## 2021-04-22 PROCEDURE — 36415 COLL VENOUS BLD VENIPUNCTURE: CPT

## 2021-04-22 PROCEDURE — 80053 COMPREHEN METABOLIC PANEL: CPT

## 2021-04-22 PROCEDURE — 96413 CHEMO IV INFUSION 1 HR: CPT

## 2021-04-22 PROCEDURE — 85025 COMPLETE CBC W/AUTO DIFF WBC: CPT

## 2021-04-22 RX ORDER — SODIUM CHLORIDE 9 MG/ML
25 INJECTION, SOLUTION INTRAVENOUS CONTINUOUS
Status: ACTIVE | OUTPATIENT
Start: 2021-04-22 | End: 2021-04-22

## 2021-04-22 RX ORDER — SODIUM CHLORIDE 0.9 % (FLUSH) 0.9 %
10 SYRINGE (ML) INJECTION AS NEEDED
Status: ACTIVE | OUTPATIENT
Start: 2021-04-22 | End: 2021-04-22

## 2021-04-22 RX ADMIN — SODIUM CHLORIDE 200 MG: 900 INJECTION, SOLUTION INTRAVENOUS at 11:45

## 2021-04-22 RX ADMIN — Medication 10 ML: at 11:40

## 2021-04-22 RX ADMIN — Medication 10 ML: at 12:18

## 2021-04-22 RX ADMIN — SODIUM CHLORIDE 25 ML/HR: 900 INJECTION, SOLUTION INTRAVENOUS at 11:45

## 2021-04-22 NOTE — PROGRESS NOTES
4/22/2021  Patient seen today with Dr Yloanda Rodriguez for a pre-treatment office visit for [address]. Patient proceeding to infusion. Questions and discussion completed to the satisfaction of the patient. Dr Yolanda Rodriguez discussed the possibility of a six week Keytruda cycle after the next scan. CT scan scheduled prior to next OV. Dr Yolanda Rodriguez explained that it was okay for patient to decide between changing eye glass prescription or having cataract surgery and okay to take the diflucan. Dr Yolanda Rodriguez stated that we may possibly use a low dose steroid if the joint pain continues. Patient wishes to continue on treatment plan and navigation will follow.

## 2021-04-22 NOTE — PROGRESS NOTES
Arrived to the [facility]. Keytruda infusion completed. Patient tolerated without issue. Any issues or concerns during appointment: none. Patient aware of next infusion appointment on 5/13 at 11:30  Discharged ambulatory to home.

## 2021-05-11 RX ORDER — SODIUM CHLORIDE 0.9 % (FLUSH) 0.9 %
10 SYRINGE (ML) INJECTION ONCE
Status: CANCELLED | OUTPATIENT
Start: 2021-05-11 | End: 2021-05-11

## 2021-05-13 ENCOUNTER — PATIENT OUTREACH (OUTPATIENT)
Dept: CASE MANAGEMENT | Age: 81
End: 2021-05-13

## 2021-05-13 ENCOUNTER — HOSPITAL ENCOUNTER (OUTPATIENT)
Dept: INFUSION THERAPY | Age: 81
Discharge: HOME | End: 2021-05-13
Payer: MEDICARE

## 2021-05-13 VITALS — BODY MASS INDEX: 22.96 KG/M2 | HEIGHT: 65 IN

## 2021-05-13 DIAGNOSIS — C34.91 MALIGNANT NEOPLASM OF RIGHT LUNG, UNSPECIFIED PART OF LUNG (HCC): Primary | ICD-10-CM

## 2021-05-13 DIAGNOSIS — C34.91 MALIGNANT NEOPLASM OF RIGHT LUNG, UNSPECIFIED PART OF LUNG (HCC): ICD-10-CM

## 2021-05-13 LAB
ALBUMIN SERPL-MCNC: 3.8 G/DL (ref 3.2–4.6)
ALBUMIN/GLOB SERPL: 1.3 {RATIO} (ref 1.2–3.5)
ALP SERPL-CCNC: 104 U/L (ref 50–136)
ALT SERPL-CCNC: 23 U/L (ref 12–65)
ANION GAP SERPL CALC-SCNC: 4 MMOL/L (ref 7–16)
AST SERPL-CCNC: 19 U/L (ref 15–37)
BASOPHILS # BLD: 0 K/UL (ref 0–0.2)
BASOPHILS NFR BLD: 1 % (ref 0–2)
BILIRUB SERPL-MCNC: 0.3 MG/DL (ref 0.2–1.1)
BUN SERPL-MCNC: 11 MG/DL (ref 8–23)
CALCIUM SERPL-MCNC: 8.8 MG/DL (ref 8.3–10.4)
CHLORIDE SERPL-SCNC: 106 MMOL/L (ref 98–107)
CO2 SERPL-SCNC: 31 MMOL/L (ref 21–32)
CREAT SERPL-MCNC: 0.7 MG/DL (ref 0.6–1)
DIFFERENTIAL METHOD BLD: ABNORMAL
EOSINOPHIL # BLD: 0.1 K/UL (ref 0–0.8)
EOSINOPHIL NFR BLD: 3 % (ref 0.5–7.8)
ERYTHROCYTE [DISTWIDTH] IN BLOOD BY AUTOMATED COUNT: 12.8 % (ref 11.9–14.6)
GLOBULIN SER CALC-MCNC: 3 G/DL (ref 2.3–3.5)
GLUCOSE SERPL-MCNC: 101 MG/DL (ref 65–100)
HCT VFR BLD AUTO: 37.1 % (ref 35.8–46.3)
HGB BLD-MCNC: 12 G/DL (ref 11.7–15.4)
IMM GRANULOCYTES # BLD AUTO: 0 K/UL (ref 0–0.5)
IMM GRANULOCYTES NFR BLD AUTO: 0 % (ref 0–5)
LYMPHOCYTES # BLD: 1.4 K/UL (ref 0.5–4.6)
LYMPHOCYTES NFR BLD: 28 % (ref 13–44)
MCH RBC QN AUTO: 32.3 PG (ref 26.1–32.9)
MCHC RBC AUTO-ENTMCNC: 32.3 G/DL (ref 31.4–35)
MCV RBC AUTO: 100 FL (ref 79.6–97.8)
MONOCYTES # BLD: 0.6 K/UL (ref 0.1–1.3)
MONOCYTES NFR BLD: 11 % (ref 4–12)
NEUTS SEG # BLD: 3 K/UL (ref 1.7–8.2)
NEUTS SEG NFR BLD: 57 % (ref 43–78)
NRBC # BLD: 0 K/UL (ref 0–0.2)
PLATELET # BLD AUTO: 150 K/UL (ref 150–450)
PMV BLD AUTO: 9.8 FL (ref 9.4–12.3)
POTASSIUM SERPL-SCNC: 3.9 MMOL/L (ref 3.5–5.1)
PROT SERPL-MCNC: 6.8 G/DL (ref 6.3–8.2)
RBC # BLD AUTO: 3.71 M/UL (ref 4.05–5.2)
SODIUM SERPL-SCNC: 141 MMOL/L (ref 136–145)
TSH SERPL DL<=0.005 MIU/L-ACNC: 1.32 UIU/ML (ref 0.36–3.74)
WBC # BLD AUTO: 5.1 K/UL (ref 4.3–11.1)

## 2021-05-13 PROCEDURE — 85025 COMPLETE CBC W/AUTO DIFF WBC: CPT

## 2021-05-13 PROCEDURE — 84443 ASSAY THYROID STIM HORMONE: CPT

## 2021-05-13 PROCEDURE — 36415 COLL VENOUS BLD VENIPUNCTURE: CPT

## 2021-05-13 PROCEDURE — 96413 CHEMO IV INFUSION 1 HR: CPT

## 2021-05-13 PROCEDURE — 80053 COMPREHEN METABOLIC PANEL: CPT

## 2021-05-13 PROCEDURE — 74011250636 HC RX REV CODE- 250/636: Performed by: NURSE PRACTITIONER

## 2021-05-13 PROCEDURE — 74011000258 HC RX REV CODE- 258: Performed by: NURSE PRACTITIONER

## 2021-05-13 RX ORDER — SODIUM CHLORIDE 9 MG/ML
25 INJECTION, SOLUTION INTRAVENOUS CONTINUOUS
Status: DISCONTINUED | OUTPATIENT
Start: 2021-05-13 | End: 2021-05-14 | Stop reason: HOSPADM

## 2021-05-13 RX ORDER — SODIUM CHLORIDE 0.9 % (FLUSH) 0.9 %
10 SYRINGE (ML) INJECTION AS NEEDED
Status: DISCONTINUED | OUTPATIENT
Start: 2021-05-13 | End: 2021-05-14 | Stop reason: HOSPADM

## 2021-05-13 RX ADMIN — SODIUM CHLORIDE 200 MG: 900 INJECTION, SOLUTION INTRAVENOUS at 13:15

## 2021-05-13 RX ADMIN — Medication 10 ML: at 13:55

## 2021-05-13 RX ADMIN — SODIUM CHLORIDE 25 ML/HR: 900 INJECTION, SOLUTION INTRAVENOUS at 12:55

## 2021-05-13 RX ADMIN — Medication 10 ML: at 12:53

## 2021-05-13 NOTE — PROGRESS NOTES
Arrived to the [facility]. Keytruda infusion completed. Patient tolerated well. Any issues or concerns during appointment: NO.  Patient aware of next infusion appointment on 06/03/21 (date) at [phone] (time). Discharged ambulatory.

## 2021-05-13 NOTE — PROGRESS NOTES
5/13/2021  Patient seen with Suzanne Homans, NP for a pre-treatment office visit for C8 of Keytruda. Patient proceeding to infusion. Questions and discussion completed to the satisfaction of the patient. Adjusting the CT date to coincide with the Dr Joe Robertson appointment. Patient encouraged to eat high protein foods and drink plenty of water. Patient instructed to call the office for any questions or concerns.  Patient wishes to continue on treatment plan and navigation will follow

## 2021-06-03 ENCOUNTER — HOSPITAL ENCOUNTER (OUTPATIENT)
Dept: INFUSION THERAPY | Age: 81
Discharge: HOME | End: 2021-06-03
Payer: MEDICARE

## 2021-06-03 ENCOUNTER — HOSPITAL ENCOUNTER (OUTPATIENT)
Dept: LAB | Age: 81
Discharge: HOME | End: 2021-06-03

## 2021-06-03 ENCOUNTER — PATIENT OUTREACH (OUTPATIENT)
Dept: CASE MANAGEMENT | Age: 81
End: 2021-06-03

## 2021-06-03 DIAGNOSIS — C34.91 MALIGNANT NEOPLASM OF RIGHT LUNG, UNSPECIFIED PART OF LUNG (HCC): Primary | ICD-10-CM

## 2021-06-03 PROCEDURE — 74011000258 HC RX REV CODE- 258: Performed by: NURSE PRACTITIONER

## 2021-06-03 PROCEDURE — 96413 CHEMO IV INFUSION 1 HR: CPT

## 2021-06-03 PROCEDURE — 74011250636 HC RX REV CODE- 250/636: Performed by: NURSE PRACTITIONER

## 2021-06-03 RX ORDER — SODIUM CHLORIDE 9 MG/ML
25 INJECTION, SOLUTION INTRAVENOUS CONTINUOUS
Status: ACTIVE | OUTPATIENT
Start: 2021-06-03 | End: 2021-06-03

## 2021-06-03 RX ORDER — SODIUM CHLORIDE 0.9 % (FLUSH) 0.9 %
10 SYRINGE (ML) INJECTION AS NEEDED
Status: ACTIVE | OUTPATIENT
Start: 2021-06-03 | End: 2021-06-03

## 2021-06-03 RX ADMIN — SODIUM CHLORIDE 25 ML/HR: 9 INJECTION, SOLUTION INTRAVENOUS at 11:20

## 2021-06-03 RX ADMIN — Medication 10 ML: at 12:25

## 2021-06-03 RX ADMIN — SODIUM CHLORIDE 200 MG: 900 INJECTION, SOLUTION INTRAVENOUS at 11:50

## 2021-06-03 RX ADMIN — Medication 10 ML: at 11:20

## 2021-06-03 NOTE — PROGRESS NOTES
[facility] Assessment completed and labs reviewed. Keytruda infusion completed. Patient tolerated well. Any issues or concerns during appointment: No.  Patient aware of next infusion appointment on 07/15/21 @1030 . Discharged ambulatory.

## 2021-06-17 ENCOUNTER — HOSPITAL ENCOUNTER (OUTPATIENT)
Dept: CT IMAGING | Age: 81
Discharge: HOME | End: 2021-06-17
Attending: INTERNAL MEDICINE

## 2021-06-17 DIAGNOSIS — C34.91 MALIGNANT NEOPLASM OF RIGHT LUNG, UNSPECIFIED PART OF LUNG (HCC): ICD-10-CM

## 2021-06-17 RX ORDER — SODIUM CHLORIDE 0.9 % (FLUSH) 0.9 %
10 SYRINGE (ML) INJECTION
Status: COMPLETED | OUTPATIENT
Start: 2021-06-17 | End: 2021-06-17

## 2021-06-17 RX ORDER — HEPARIN SODIUM (PORCINE) LOCK FLUSH IV SOLN 100 UNIT/ML 100 UNIT/ML
500 SOLUTION INTRAVENOUS AS NEEDED
Status: DISCONTINUED | OUTPATIENT
Start: 2021-06-17 | End: 2021-06-21 | Stop reason: HOSPADM

## 2021-06-17 RX ADMIN — Medication 10 ML: at 14:23

## 2021-06-17 NOTE — PROGRESS NOTES
Pt decided not to have port accessed today. She said that she had a good vein in her right arm. So peripheral IV stated in right [facility] with # 20 gauge jelco.  Pt tolerated procedure well, offering no complaints.

## 2021-06-24 ENCOUNTER — HOSPITAL ENCOUNTER (OUTPATIENT)
Dept: INFUSION THERAPY | Age: 81
Discharge: HOME | End: 2021-06-24
Payer: MEDICARE

## 2021-06-24 ENCOUNTER — HOSPITAL ENCOUNTER (OUTPATIENT)
Dept: LAB | Age: 81
Discharge: HOME | End: 2021-06-24

## 2021-06-24 ENCOUNTER — PATIENT OUTREACH (OUTPATIENT)
Dept: CASE MANAGEMENT | Age: 81
End: 2021-06-24

## 2021-06-24 DIAGNOSIS — C34.91 MALIGNANT NEOPLASM OF RIGHT LUNG, UNSPECIFIED PART OF LUNG (HCC): Primary | ICD-10-CM

## 2021-06-24 PROCEDURE — 74011000258 HC RX REV CODE- 258: Performed by: INTERNAL MEDICINE

## 2021-06-24 PROCEDURE — 96413 CHEMO IV INFUSION 1 HR: CPT

## 2021-06-24 PROCEDURE — 74011250636 HC RX REV CODE- 250/636: Performed by: INTERNAL MEDICINE

## 2021-06-24 RX ORDER — SODIUM CHLORIDE 9 MG/ML
25 INJECTION, SOLUTION INTRAVENOUS CONTINUOUS
Status: ACTIVE | OUTPATIENT
Start: 2021-06-24 | End: 2021-06-24

## 2021-06-24 RX ORDER — SODIUM CHLORIDE 0.9 % (FLUSH) 0.9 %
10 SYRINGE (ML) INJECTION AS NEEDED
Status: ACTIVE | OUTPATIENT
Start: 2021-06-24 | End: 2021-06-24

## 2021-06-24 RX ADMIN — SODIUM CHLORIDE 400 MG: 900 INJECTION, SOLUTION INTRAVENOUS at 12:15

## 2021-06-24 RX ADMIN — Medication 10 ML: at 12:48

## 2021-06-24 RX ADMIN — SODIUM CHLORIDE 25 ML/HR: 9 INJECTION, SOLUTION INTRAVENOUS at 11:55

## 2021-06-24 NOTE — PROGRESS NOTES
Arrived to the [facility]. Keytruda completed. Patient tolerated well. Any issues or concerns during appointment: none. Discharged ambulatory.     Kayy Perales RN

## 2021-06-24 NOTE — PROGRESS NOTES
6/24/2021  Patient seen with Dr Ann Marie Leon for a pre-treatment office visit for C10 of three week Keytruda and scan review. . Questions and discussion completed to the satisfaction of the patient. Scan is clear of any issues. Patient's Abril Newcomer being changed to a six week Keytruda beginning today. Schedule confirmed to work with patient's vacation schedule. Patient instructed that it is okay to have teeth cleaning, cataract surgery, and to have a cyst removed.   Patient wishes to continue on treatment plan and navigation will follow

## 2021-07-19 ENCOUNTER — TRANSCRIBE ORDER (OUTPATIENT)
Dept: REGISTRATION | Age: 81
End: 2021-07-19

## 2021-07-19 DIAGNOSIS — Z12.31 SCREENING MAMMOGRAM FOR HIGH-RISK PATIENT: Primary | ICD-10-CM

## 2021-08-05 ENCOUNTER — PATIENT OUTREACH (OUTPATIENT)
Dept: CASE MANAGEMENT | Age: 81
End: 2021-08-05

## 2021-08-05 ENCOUNTER — HOSPITAL ENCOUNTER (OUTPATIENT)
Dept: INFUSION THERAPY | Age: 81
Discharge: HOME | End: 2021-08-05

## 2021-08-05 ENCOUNTER — HOSPITAL ENCOUNTER (OUTPATIENT)
Dept: LAB | Age: 81
Discharge: HOME | End: 2021-08-05
Payer: MEDICARE

## 2021-08-05 DIAGNOSIS — R68.89 OTHER GENERAL SYMPTOMS AND SIGNS: ICD-10-CM

## 2021-08-05 DIAGNOSIS — C34.91 MALIGNANT NEOPLASM OF RIGHT LUNG, UNSPECIFIED PART OF LUNG (HCC): ICD-10-CM

## 2021-08-05 LAB
ALBUMIN SERPL-MCNC: 3.8 G/DL (ref 3.2–4.6)
ALBUMIN/GLOB SERPL: 1.4 {RATIO} (ref 1.2–3.5)
ALP SERPL-CCNC: 90 U/L (ref 50–136)
ALT SERPL-CCNC: 25 U/L (ref 12–65)
ANION GAP SERPL CALC-SCNC: 3 MMOL/L (ref 7–16)
AST SERPL-CCNC: 23 U/L (ref 15–37)
BASOPHILS # BLD: 0 K/UL (ref 0–0.2)
BASOPHILS NFR BLD: 0 % (ref 0–2)
BILIRUB SERPL-MCNC: 0.6 MG/DL (ref 0.2–1.1)
BUN SERPL-MCNC: 12 MG/DL (ref 8–23)
CALCIUM SERPL-MCNC: 9.1 MG/DL (ref 8.3–10.4)
CHLORIDE SERPL-SCNC: 108 MMOL/L (ref 98–107)
CO2 SERPL-SCNC: 29 MMOL/L (ref 21–32)
CREAT SERPL-MCNC: 0.7 MG/DL (ref 0.6–1)
DIFFERENTIAL METHOD BLD: ABNORMAL
EOSINOPHIL # BLD: 0.1 K/UL (ref 0–0.8)
EOSINOPHIL NFR BLD: 2 % (ref 0.5–7.8)
ERYTHROCYTE [DISTWIDTH] IN BLOOD BY AUTOMATED COUNT: 15.6 % (ref 11.9–14.6)
GLOBULIN SER CALC-MCNC: 2.7 G/DL (ref 2.3–3.5)
GLUCOSE SERPL-MCNC: 95 MG/DL (ref 65–100)
HCT VFR BLD AUTO: 36.9 % (ref 35.8–46.3)
HGB BLD-MCNC: 11.9 G/DL (ref 11.7–15.4)
IMM GRANULOCYTES # BLD AUTO: 0 K/UL (ref 0–0.5)
IMM GRANULOCYTES NFR BLD AUTO: 1 % (ref 0–5)
LYMPHOCYTES # BLD: 1.2 K/UL (ref 0.5–4.6)
LYMPHOCYTES NFR BLD: 25 % (ref 13–44)
MCH RBC QN AUTO: 30.2 PG (ref 26.1–32.9)
MCHC RBC AUTO-ENTMCNC: 32.2 G/DL (ref 31.4–35)
MCV RBC AUTO: 93.7 FL (ref 79.6–97.8)
MONOCYTES # BLD: 0.5 K/UL (ref 0.1–1.3)
MONOCYTES NFR BLD: 10 % (ref 4–12)
NEUTS SEG # BLD: 2.9 K/UL (ref 1.7–8.2)
NEUTS SEG NFR BLD: 61 % (ref 43–78)
NRBC # BLD: 0 K/UL (ref 0–0.2)
PLATELET # BLD AUTO: 146 K/UL (ref 150–450)
PMV BLD AUTO: 9.8 FL (ref 9.4–12.3)
POTASSIUM SERPL-SCNC: 4 MMOL/L (ref 3.5–5.1)
PROT SERPL-MCNC: 6.5 G/DL (ref 6.3–8.2)
RBC # BLD AUTO: 3.94 M/UL (ref 4.05–5.2)
SODIUM SERPL-SCNC: 140 MMOL/L (ref 136–145)
TSH SERPL DL<=0.005 MIU/L-ACNC: 1.31 UIU/ML (ref 0.36–3)
WBC # BLD AUTO: 4.8 K/UL (ref 4.3–11.1)

## 2021-08-05 PROCEDURE — 84443 ASSAY THYROID STIM HORMONE: CPT

## 2021-08-05 PROCEDURE — 80053 COMPREHEN METABOLIC PANEL: CPT

## 2021-08-05 PROCEDURE — 36415 COLL VENOUS BLD VENIPUNCTURE: CPT

## 2021-08-05 PROCEDURE — 85025 COMPLETE CBC W/AUTO DIFF WBC: CPT

## 2021-08-05 NOTE — PROGRESS NOTES
8/5/2021  Patient seen with Dr Sveta Fernandez for a pre-treatment office visit for [address] (6 week). Patient requesting to hold treatment for a family vacation. Questions and discussion completed to the satisfaction of the patient. Schedule adjusted to accommodate the patient request. Patient wishes to continue on treatment plan and navigation will follow.

## 2021-09-09 ENCOUNTER — HOSPITAL ENCOUNTER (OUTPATIENT)
Dept: LAB | Age: 81
Discharge: HOME | End: 2021-09-09

## 2021-09-09 ENCOUNTER — HOSPITAL ENCOUNTER (OUTPATIENT)
Dept: INFUSION THERAPY | Age: 81
Discharge: HOME | End: 2021-09-09
Payer: MEDICARE

## 2021-09-09 DIAGNOSIS — C34.91 MALIGNANT NEOPLASM OF RIGHT LUNG, UNSPECIFIED PART OF LUNG (HCC): Primary | ICD-10-CM

## 2021-09-09 PROCEDURE — 96413 CHEMO IV INFUSION 1 HR: CPT

## 2021-09-09 PROCEDURE — 74011000258 HC RX REV CODE- 258: Performed by: INTERNAL MEDICINE

## 2021-09-09 PROCEDURE — 74011250636 HC RX REV CODE- 250/636: Performed by: INTERNAL MEDICINE

## 2021-09-09 RX ORDER — SODIUM CHLORIDE 9 MG/ML
25 INJECTION, SOLUTION INTRAVENOUS CONTINUOUS
Status: ACTIVE | OUTPATIENT
Start: 2021-09-09 | End: 2021-09-09

## 2021-09-09 RX ORDER — SODIUM CHLORIDE 0.9 % (FLUSH) 0.9 %
10 SYRINGE (ML) INJECTION AS NEEDED
Status: ACTIVE | OUTPATIENT
Start: 2021-09-09 | End: 2021-09-09

## 2021-09-09 RX ADMIN — Medication 10 ML: at 10:40

## 2021-09-09 RX ADMIN — SODIUM CHLORIDE 400 MG: 9 INJECTION, SOLUTION INTRAVENOUS at 10:56

## 2021-09-09 RX ADMIN — Medication 10 ML: at 11:42

## 2021-09-09 RX ADMIN — SODIUM CHLORIDE 25 ML/HR: 900 INJECTION, SOLUTION INTRAVENOUS at 10:40

## 2021-09-09 NOTE — PROGRESS NOTES
[facility] Assessment completed and labs reviewed. Pre med and Keytruda infusion completed. Patient tolerated well. Any issues or concerns during appointment: none. Patient aware of next infusion appointment on 10/22/2021 at 1100   Discharged ambulatory.

## 2021-09-30 ENCOUNTER — HOSPITAL ENCOUNTER (OUTPATIENT)
Dept: MAMMOGRAPHY | Age: 81
Discharge: HOME | End: 2021-09-30
Attending: OBSTETRICS & GYNECOLOGY
Payer: MEDICARE

## 2021-09-30 DIAGNOSIS — Z12.31 SCREENING MAMMOGRAM FOR HIGH-RISK PATIENT: ICD-10-CM

## 2021-09-30 PROCEDURE — 77063 BREAST TOMOSYNTHESIS BI: CPT

## 2021-10-04 ENCOUNTER — HOSPITAL ENCOUNTER (OUTPATIENT)
Dept: CT IMAGING | Age: 81
Discharge: HOME | End: 2021-10-04
Attending: INTERNAL MEDICINE

## 2021-10-04 DIAGNOSIS — C34.91 MALIGNANT NEOPLASM OF RIGHT LUNG, UNSPECIFIED PART OF LUNG (HCC): ICD-10-CM

## 2021-10-04 RX ORDER — SODIUM CHLORIDE 0.9 % (FLUSH) 0.9 %
10 SYRINGE (ML) INJECTION
Status: COMPLETED | OUTPATIENT
Start: 2021-10-04 | End: 2021-10-04

## 2021-10-04 RX ADMIN — Medication 10 ML: at 13:58

## 2021-10-22 ENCOUNTER — HOSPITAL ENCOUNTER (OUTPATIENT)
Dept: INFUSION THERAPY | Age: 81
Discharge: HOME | End: 2021-10-22

## 2021-10-22 ENCOUNTER — HOSPITAL ENCOUNTER (OUTPATIENT)
Dept: LAB | Age: 81
Discharge: HOME | End: 2021-10-22

## 2021-10-22 ENCOUNTER — PATIENT OUTREACH (OUTPATIENT)
Dept: CASE MANAGEMENT | Age: 81
End: 2021-10-22

## 2021-10-22 DIAGNOSIS — C34.91 MALIGNANT NEOPLASM OF RIGHT LUNG, UNSPECIFIED PART OF LUNG (HCC): ICD-10-CM

## 2021-10-22 NOTE — PROGRESS NOTES
10/22/21 saw pt today with Dr. Ann-Marie Katz for pre chemo cycle 12 keytruda. She is reporting increased fatigue and joint aches, especially in hands. Restaging CT looks great. Discussed how to proceed from here - 1. Continue with 2 more doses of keytruda with low dose prednisone or 2. Stop Luisito Kub now. Plan is to stop now. PO intake is good. Follow up in 3 months with repeat scan. Encouraged to call with any concerns. Navigation will sign off.

## 2022-01-19 ENCOUNTER — HOSPITAL ENCOUNTER (OUTPATIENT)
Dept: CT IMAGING | Age: 82
Discharge: HOME | End: 2022-01-19
Attending: INTERNAL MEDICINE
Payer: MEDICARE

## 2022-01-19 DIAGNOSIS — J91.0 MALIGNANT PLEURAL EFFUSION: ICD-10-CM

## 2022-01-19 DIAGNOSIS — C34.91 MALIGNANT NEOPLASM OF RIGHT LUNG, UNSPECIFIED PART OF LUNG (HCC): ICD-10-CM

## 2022-01-19 LAB — CREAT BLD-MCNC: 0.91 MG/DL (ref 0.8–1.5)

## 2022-01-19 PROCEDURE — 82565 ASSAY OF CREATININE: CPT

## 2022-01-19 PROCEDURE — 74177 CT ABD & PELVIS W/CONTRAST: CPT

## 2022-01-19 PROCEDURE — 74011000636 HC RX REV CODE- 636: Performed by: INTERNAL MEDICINE

## 2022-01-19 PROCEDURE — 74011000258 HC RX REV CODE- 258: Performed by: INTERNAL MEDICINE

## 2022-01-19 RX ORDER — SODIUM CHLORIDE 0.9 % (FLUSH) 0.9 %
10 SYRINGE (ML) INJECTION
Status: COMPLETED | OUTPATIENT
Start: 2022-01-19 | End: 2022-01-19

## 2022-01-19 RX ADMIN — SODIUM CHLORIDE 100 ML: 9 INJECTION, SOLUTION INTRAVENOUS at 10:44

## 2022-01-19 RX ADMIN — DIATRIZOATE MEGLUMINE AND DIATRIZOATE SODIUM 15 ML: 660; 100 LIQUID ORAL; RECTAL at 10:45

## 2022-01-19 RX ADMIN — IOPAMIDOL 100 ML: 755 INJECTION, SOLUTION INTRAVENOUS at 10:45

## 2022-01-19 RX ADMIN — Medication 10 ML: at 10:45

## 2022-01-20 ENCOUNTER — HOSPITAL ENCOUNTER (OUTPATIENT)
Dept: INFUSION THERAPY | Age: 82
Discharge: HOME | End: 2022-01-20
Payer: MEDICARE

## 2022-01-20 DIAGNOSIS — C34.91 MALIGNANT NEOPLASM OF RIGHT LUNG, UNSPECIFIED PART OF LUNG (HCC): ICD-10-CM

## 2022-01-20 LAB
ALBUMIN SERPL-MCNC: 3.6 G/DL (ref 3.2–4.6)
ALBUMIN/GLOB SERPL: 1.2 {RATIO} (ref 1.2–3.5)
ALP SERPL-CCNC: 63 U/L (ref 50–136)
ALT SERPL-CCNC: 21 U/L (ref 12–65)
ANION GAP SERPL CALC-SCNC: 4 MMOL/L (ref 7–16)
AST SERPL-CCNC: 18 U/L (ref 15–37)
BASOPHILS # BLD: 0 K/UL (ref 0–0.2)
BASOPHILS NFR BLD: 0 % (ref 0–2)
BILIRUB SERPL-MCNC: 0.5 MG/DL (ref 0.2–1.1)
BUN SERPL-MCNC: 11 MG/DL (ref 8–23)
CALCIUM SERPL-MCNC: 9.6 MG/DL (ref 8.3–10.4)
CHLORIDE SERPL-SCNC: 106 MMOL/L (ref 98–107)
CO2 SERPL-SCNC: 30 MMOL/L (ref 21–32)
CREAT SERPL-MCNC: 0.8 MG/DL (ref 0.6–1)
DIFFERENTIAL METHOD BLD: ABNORMAL
EOSINOPHIL # BLD: 0.1 K/UL (ref 0–0.8)
EOSINOPHIL NFR BLD: 2 % (ref 0.5–7.8)
ERYTHROCYTE [DISTWIDTH] IN BLOOD BY AUTOMATED COUNT: 15.4 % (ref 11.9–14.6)
GLOBULIN SER CALC-MCNC: 2.9 G/DL (ref 2.3–3.5)
GLUCOSE SERPL-MCNC: 99 MG/DL (ref 65–100)
HCT VFR BLD AUTO: 38.8 % (ref 35.8–46.3)
HGB BLD-MCNC: 12.8 G/DL (ref 11.7–15.4)
IMM GRANULOCYTES # BLD AUTO: 0.1 K/UL (ref 0–0.5)
IMM GRANULOCYTES NFR BLD AUTO: 1 % (ref 0–5)
LYMPHOCYTES # BLD: 1.4 K/UL (ref 0.5–4.6)
LYMPHOCYTES NFR BLD: 17 % (ref 13–44)
MCH RBC QN AUTO: 31.3 PG (ref 26.1–32.9)
MCHC RBC AUTO-ENTMCNC: 33 G/DL (ref 31.4–35)
MCV RBC AUTO: 94.9 FL (ref 79.6–97.8)
MONOCYTES # BLD: 0.5 K/UL (ref 0.1–1.3)
MONOCYTES NFR BLD: 6 % (ref 4–12)
NEUTS SEG # BLD: 6 K/UL (ref 1.7–8.2)
NEUTS SEG NFR BLD: 74 % (ref 43–78)
NRBC # BLD: 0 K/UL (ref 0–0.2)
PLATELET # BLD AUTO: 161 K/UL (ref 150–450)
PMV BLD AUTO: 10.2 FL (ref 9.4–12.3)
POTASSIUM SERPL-SCNC: 4.1 MMOL/L (ref 3.5–5.1)
PROT SERPL-MCNC: 6.5 G/DL (ref 6.3–8.2)
RBC # BLD AUTO: 4.09 M/UL (ref 4.05–5.2)
SODIUM SERPL-SCNC: 140 MMOL/L (ref 136–145)
WBC # BLD AUTO: 8 K/UL (ref 4.3–11.1)

## 2022-01-20 PROCEDURE — 36591 DRAW BLOOD OFF VENOUS DEVICE: CPT

## 2022-01-20 PROCEDURE — 80053 COMPREHEN METABOLIC PANEL: CPT

## 2022-01-20 PROCEDURE — 85025 COMPLETE CBC W/AUTO DIFF WBC: CPT

## 2022-01-20 RX ORDER — SODIUM CHLORIDE 0.9 % (FLUSH) 0.9 %
10 SYRINGE (ML) INJECTION AS NEEDED
Status: DISCONTINUED | OUTPATIENT
Start: 2022-01-20 | End: 2022-01-22 | Stop reason: HOSPADM

## 2022-01-20 RX ADMIN — Medication 10 ML: at 13:29

## 2022-01-20 NOTE — PROGRESS NOTES
Patient arrived to port lab for port access and lab draw   [address] accessed and labs drawn per protocol   / *Port flushed and de-accessed  Patient discharged from port lab ambulatory*

## 2022-02-15 PROBLEM — R53.83 FATIGUE: Status: ACTIVE | Noted: 2017-03-24

## 2022-03-18 PROBLEM — R09.02 HYPOXEMIA: Status: ACTIVE | Noted: 2020-10-27

## 2022-03-18 PROBLEM — K21.9 GASTROESOPHAGEAL REFLUX DISEASE WITHOUT ESOPHAGITIS: Status: ACTIVE | Noted: 2017-09-22

## 2022-03-18 PROBLEM — L29.0 RECTAL ITCHING: Status: ACTIVE | Noted: 2017-03-24

## 2022-03-19 PROBLEM — F51.02 INSOMNIA DUE TO STRESS: Status: ACTIVE | Noted: 2018-06-25

## 2022-03-19 PROBLEM — Z28.21 REFUSED PNEUMOCOCCAL VACCINE: Status: ACTIVE | Noted: 2018-06-25

## 2022-03-19 PROBLEM — C34.91 MALIGNANT NEOPLASM OF RIGHT LUNG (HCC): Status: ACTIVE | Noted: 2020-11-20

## 2022-03-19 PROBLEM — I50.22 SYSTOLIC CHF, CHRONIC (HCC): Status: ACTIVE | Noted: 2020-11-20

## 2022-03-19 PROBLEM — R53.83 FATIGUE: Status: ACTIVE | Noted: 2017-03-24

## 2022-03-19 PROBLEM — Z28.21 REFUSED VARICELLA VACCINE: Status: ACTIVE | Noted: 2018-06-25

## 2022-03-19 PROBLEM — Z28.21 REFUSED INFLUENZA VACCINE: Status: ACTIVE | Noted: 2018-06-25

## 2022-03-19 PROBLEM — D64.9 SEVERE ANEMIA: Status: ACTIVE | Noted: 2020-11-02

## 2022-03-20 PROBLEM — J90 PLEURAL EFFUSION: Status: ACTIVE | Noted: 2020-10-26

## 2022-04-25 ENCOUNTER — HOSPITAL ENCOUNTER (OUTPATIENT)
Dept: CT IMAGING | Age: 82
Discharge: HOME | End: 2022-04-25
Attending: INTERNAL MEDICINE

## 2022-04-25 DIAGNOSIS — C34.91 MALIGNANT NEOPLASM OF RIGHT LUNG, UNSPECIFIED PART OF LUNG (HCC): ICD-10-CM

## 2022-04-28 ENCOUNTER — HOSPITAL ENCOUNTER (OUTPATIENT)
Dept: GENERAL RADIOLOGY | Age: 82
Discharge: HOME | End: 2022-04-28

## 2022-04-28 ENCOUNTER — HOSPITAL ENCOUNTER (OUTPATIENT)
Dept: INFUSION THERAPY | Age: 82
Discharge: HOME | End: 2022-04-28
Payer: MEDICARE

## 2022-04-28 DIAGNOSIS — C34.91 MALIGNANT NEOPLASM OF RIGHT LUNG, UNSPECIFIED PART OF LUNG (HCC): Primary | ICD-10-CM

## 2022-04-28 DIAGNOSIS — M79.641 PAIN IN BOTH HANDS: ICD-10-CM

## 2022-04-28 DIAGNOSIS — M79.642 PAIN IN BOTH HANDS: ICD-10-CM

## 2022-04-28 DIAGNOSIS — R53.83 FATIGUE, UNSPECIFIED TYPE: ICD-10-CM

## 2022-04-28 DIAGNOSIS — C34.91 MALIGNANT NEOPLASM OF RIGHT LUNG, UNSPECIFIED PART OF LUNG (HCC): ICD-10-CM

## 2022-04-28 DIAGNOSIS — D89.89 OTHER SPECIFIED DISORDERS INVOLVING THE IMMUNE MECHANISM, NOT ELSEWHERE CLASSIFIED (HCC): ICD-10-CM

## 2022-04-28 DIAGNOSIS — M25.551 HIP PAIN, BILATERAL: ICD-10-CM

## 2022-04-28 DIAGNOSIS — F51.02 INSOMNIA DUE TO STRESS: Primary | ICD-10-CM

## 2022-04-28 DIAGNOSIS — M25.552 HIP PAIN, BILATERAL: ICD-10-CM

## 2022-04-28 DIAGNOSIS — E88.89 OTHER SPECIFIED METABOLIC DISORDERS (HCC): ICD-10-CM

## 2022-04-28 LAB
ALBUMIN SERPL-MCNC: 3.7 G/DL (ref 3.2–4.6)
ALBUMIN/GLOB SERPL: 1.4 {RATIO} (ref 1.2–3.5)
ALP SERPL-CCNC: 78 U/L (ref 50–136)
ALT SERPL-CCNC: 24 U/L (ref 12–65)
ANION GAP SERPL CALC-SCNC: 5 MMOL/L (ref 7–16)
AST SERPL-CCNC: 21 U/L (ref 15–37)
BASOPHILS # BLD: 0 K/UL (ref 0–0.2)
BASOPHILS NFR BLD: 0 % (ref 0–2)
BILIRUB SERPL-MCNC: 0.5 MG/DL (ref 0.2–1.1)
BUN SERPL-MCNC: 10 MG/DL (ref 8–23)
CALCIUM SERPL-MCNC: 8.8 MG/DL (ref 8.3–10.4)
CHLORIDE SERPL-SCNC: 107 MMOL/L (ref 98–107)
CO2 SERPL-SCNC: 28 MMOL/L (ref 21–32)
CREAT SERPL-MCNC: 0.7 MG/DL (ref 0.6–1)
CRP SERPL HS-MCNC: 0.9 MG/L
DIFFERENTIAL METHOD BLD: ABNORMAL
EOSINOPHIL # BLD: 0.3 K/UL (ref 0–0.8)
EOSINOPHIL NFR BLD: 5 % (ref 0.5–7.8)
ERYTHROCYTE [DISTWIDTH] IN BLOOD BY AUTOMATED COUNT: 13.8 % (ref 11.9–14.6)
ERYTHROCYTE [SEDIMENTATION RATE] IN BLOOD: 1 MM/HR (ref 0–30)
GLOBULIN SER CALC-MCNC: 2.7 G/DL (ref 2.3–3.5)
GLUCOSE SERPL-MCNC: 127 MG/DL (ref 65–100)
HCT VFR BLD AUTO: 39 % (ref 35.8–46.3)
HGB BLD-MCNC: 13 G/DL (ref 11.7–15.4)
HGB RETIC QN AUTO: 37 PG (ref 29–35)
IMM GRANULOCYTES # BLD AUTO: 0 K/UL (ref 0–0.5)
IMM GRANULOCYTES NFR BLD AUTO: 0 % (ref 0–5)
IMM RETICS NFR: 8.7 % (ref 3–15.9)
LYMPHOCYTES # BLD: 1.3 K/UL (ref 0.5–4.6)
LYMPHOCYTES NFR BLD: 19 % (ref 13–44)
MAGNESIUM SERPL-MCNC: 2.2 MG/DL (ref 1.8–2.4)
MCH RBC QN AUTO: 31.9 PG (ref 26.1–32.9)
MCHC RBC AUTO-ENTMCNC: 33.3 G/DL (ref 31.4–35)
MCV RBC AUTO: 95.6 FL (ref 79.6–97.8)
MONOCYTES # BLD: 0.5 K/UL (ref 0.1–1.3)
MONOCYTES NFR BLD: 8 % (ref 4–12)
NEUTS SEG # BLD: 4.6 K/UL (ref 1.7–8.2)
NEUTS SEG NFR BLD: 68 % (ref 43–78)
NRBC # BLD: 0 K/UL (ref 0–0.2)
PLATELET # BLD AUTO: 143 K/UL (ref 150–450)
PMV BLD AUTO: 9.8 FL (ref 9.4–12.3)
POTASSIUM SERPL-SCNC: 3.9 MMOL/L (ref 3.5–5.1)
PROT SERPL-MCNC: 6.4 G/DL (ref 6.3–8.2)
RBC # BLD AUTO: 4.08 M/UL (ref 4.05–5.2)
RETICS # AUTO: 0.06 M/UL (ref 0.03–0.1)
RETICS/RBC NFR AUTO: 1.4 % (ref 0.3–2)
SODIUM SERPL-SCNC: 140 MMOL/L (ref 136–145)
TSH SERPL DL<=0.005 MIU/L-ACNC: 0.74 UIU/ML (ref 0.36–3.74)
WBC # BLD AUTO: 6.8 K/UL (ref 4.3–11.1)

## 2022-04-28 PROCEDURE — 85652 RBC SED RATE AUTOMATED: CPT

## 2022-04-28 PROCEDURE — 85046 RETICYTE/HGB CONCENTRATE: CPT

## 2022-04-28 PROCEDURE — 83735 ASSAY OF MAGNESIUM: CPT

## 2022-04-28 PROCEDURE — 80053 COMPREHEN METABOLIC PANEL: CPT

## 2022-04-28 PROCEDURE — 36591 DRAW BLOOD OFF VENOUS DEVICE: CPT

## 2022-04-28 PROCEDURE — 84443 ASSAY THYROID STIM HORMONE: CPT

## 2022-04-28 PROCEDURE — 85025 COMPLETE CBC W/AUTO DIFF WBC: CPT

## 2022-04-28 PROCEDURE — 86038 ANTINUCLEAR ANTIBODIES: CPT

## 2022-04-28 PROCEDURE — 86200 CCP ANTIBODY: CPT

## 2022-04-28 PROCEDURE — 86141 C-REACTIVE PROTEIN HS: CPT

## 2022-04-28 RX ORDER — SODIUM CHLORIDE 0.9 % (FLUSH) 0.9 %
10 SYRINGE (ML) INJECTION AS NEEDED
Status: DISCONTINUED | OUTPATIENT
Start: 2022-04-28 | End: 2022-04-30 | Stop reason: HOSPADM

## 2022-04-28 RX ADMIN — Medication 10 ML: at 13:25

## 2022-04-28 NOTE — PROGRESS NOTES
Patient arrived to port lab for port access and lab draw   [facility] accessed and labs drawn per protocol   / *Port flushed and de-accessed  Patient discharged from port lab ambulatory*

## 2022-04-29 LAB — ANA SER QL: NEGATIVE

## 2022-04-30 LAB — CCP IGA+IGG SERPL IA-ACNC: 2 UNITS (ref 0–19)

## 2022-05-09 ENCOUNTER — HOSPITAL ENCOUNTER (OUTPATIENT)
Dept: INTERVENTIONAL RADIOLOGY/VASCULAR | Age: 82
Discharge: HOME | End: 2022-05-09
Attending: INTERNAL MEDICINE
Payer: MEDICARE

## 2022-05-09 VITALS
OXYGEN SATURATION: 96 % | RESPIRATION RATE: 16 BRPM | SYSTOLIC BLOOD PRESSURE: 149 MMHG | TEMPERATURE: 97.9 F | DIASTOLIC BLOOD PRESSURE: 69 MMHG | HEART RATE: 79 BPM

## 2022-05-09 PROCEDURE — 77030010507 HC ADH SKN DERMBND J&J -B

## 2022-05-09 PROCEDURE — 77030031131 HC SUT MXN P COVD -B

## 2022-05-09 PROCEDURE — 36590 REMOVAL TUNNELED CV CATH: CPT

## 2022-05-09 PROCEDURE — 77030031139 HC SUT VCRL2 J&J -A

## 2022-05-09 PROCEDURE — 74011000250 HC RX REV CODE- 250: Performed by: PHYSICIAN ASSISTANT

## 2022-05-09 RX ORDER — LIDOCAINE HYDROCHLORIDE AND EPINEPHRINE 20; 10 MG/ML; UG/ML
1-20 INJECTION, SOLUTION INFILTRATION; PERINEURAL
Status: DISCONTINUED | OUTPATIENT
Start: 2022-05-09 | End: 2022-05-13 | Stop reason: HOSPADM

## 2022-05-09 RX ADMIN — LIDOCAINE HYDROCHLORIDE,EPINEPHRINE BITARTRATE 400 MG: 20; .01 INJECTION, SOLUTION INFILTRATION; PERINEURAL at 15:16

## 2022-05-09 NOTE — PROGRESS NOTES
TRANSFER - OUT REPORT:    Verbal report given to Carol Croft RN(name) on Shannan Joshi  being transferred to IR Recovery 8(unit) for routine post - op       Report consisted of patients Situation, Background, Assessment and   Recommendations(SBAR). Information from the following report(s) SBAR and Procedure Summary was reviewed with the receiving nurse. Opportunity for questions and clarification was provided. Pt tolerated procedure well.      Visit Vitals  BP (!) 149/69   Pulse 79   Temp 97.9 °F (36.6 °C)   Resp 16   SpO2 96%   Breastfeeding No     Past Medical History:   Diagnosis Date    Arthritis     osteo-left hand    Autoimmune disease (HCC)     fibromyalgia    Cancer (HCC)     lung cancer, right    Compression fracture of T12 vertebra (HCC) 8/1/2014    Endometriosis     hyst    Fibrocystic breast     Fibromyalgia     GERD (gastroesophageal reflux disease)     nexium daily    Glaucoma     Followed by Dr. Nereida Armenta Headache     rare (not migraines)    Heart failure (HCC)     \"possible CHF\"    IBS (irritable bowel syndrome)     bentyl    Ovarian cyst     hx of-bilateral S&O    Pleural effusion     Rectal itching 3/24/2017    Severe anemia 11/2/2020    Stress due to spouse with dementia 4/2/2019    UTI (urinary tract infection)     hx of; last one 2-3 years ago    Vaginitis, atrophic     asymptomatic at present

## 2022-05-09 NOTE — DISCHARGE INSTRUCTIONS
[address]  Department of Interventional Radiology  [facility]  [phone] Office  [phone] Fax    DRAIN/PORT/CATHETER REMOVAL  DISCHARGE INSTRUCTIONS    General Information:     Your doctor has ordered for us to remove your drain, port, or catheter. This could be that you do not need it anymore, it is not doing its job, your physician has decided on another plan for your treatment and/or it may need replacing. Home Care Instructions: You can resume your regular diet and medication regimen. Do not drink alcohol, drive, or make any important legal decisions in the next 24 hours. Do not lift anything heavier than a gallon of milk, or do anything strenuous for the next 24 hours. You will notice a dressing over the site of the removal. This dressing should stay in place until the site is healed. The dressing should be changed at least every 48 hours. You should change the dressing sooner if it becomes soiled or wet. The nurse who discharges you to home should review with you any wound care instructions. Resume your normal level of activity slowly. You may shower after 24 hours, but do not take a bath, swim or immerse yourself in water until the site has healed, and keep the dressing dry with plastic wrap while showering. The site may ooze for a couple of days. This drainage should lessen with each passing day. Call If:     You should call your Physician and/or the Radiology Nurse if you have any bleeding other than a small spot on your bandage. Call if you have any signs of infection, fever, or increased pain at the site of the tube. Call if the oozing increases, if it changes color, or begins to have an odor. Follow-Up Instructions: Please see your ordering doctor as he/she has requested. To Reach Us: If you have any questions about your procedure, please call the Interventional Radiology department at [phone]. After business hours (5pm) and weekends, call the answering service at [phone] and ask for the Radiologist on call to be paged. Si tiene Preguntas acerca del procedimiento, por favor llame al departamento de Radiología Intervencional al [phone]. Después de horas de oficina (5 pm) y los fines de [place], llamar al [place] al [phone] y pregunte por el Radiologo de [facility]. Interventional Radiology General Nurse Discharge    After general anesthesia or intravenous sedation, for 24 hours or while taking prescription Narcotics:  · Limit your activities  · Do not drive and operate hazardous machinery  · Do not make important personal or business decisions  · Do  not drink alcoholic beverages  · If you have not urinated within 8 hours after discharge, please contact your surgeon on call. * Please give a list of your current medications to your Primary Care Provider. * Please update this list whenever your medications are discontinued, doses are     changed, or new medications (including over-the-counter products) are added. * Please carry medication information at all times in case of emergency situations. These are general instructions for a healthy lifestyle:    No smoking/ No tobacco products/ Avoid exposure to second hand smoke  Surgeon General's Warning:  Quitting smoking now greatly reduces serious risk to your health. Obesity, smoking, and sedentary lifestyle greatly increases your risk for illness  A healthy diet, regular physical exercise & weight monitoring are important for maintaining a healthy lifestyle    You may be retaining fluid if you have a history of heart failure or if you experience any of the following symptoms:  Weight gain of 3 pounds or more overnight or 5 pounds in a week, increased swelling in our hands or feet or shortness of breath while lying flat in bed.   Please call your doctor as soon as you notice any of these symptoms; do not wait until your next office visit. Recognize signs and symptoms of STROKE:  F-face looks uneven    A-arms unable to move or move unevenly    S-speech slurred or non-existent    T-time-call 911 as soon as signs and symptoms begin-DO NOT go       Back to bed or wait to see if you get better-TIME IS BRAIN.

## 2022-05-09 NOTE — PROGRESS NOTES
Recovery period without difficulty. Pt alert and oriented and denies pain. Dressing is clean, dry, and intact. Reviewed discharge instructions with patient who verbalized understanding. Pt ambulatory off the unit with no distress observed.

## 2022-08-04 DIAGNOSIS — I50.22 SYSTOLIC CHF, CHRONIC (HCC): Primary | ICD-10-CM

## 2022-08-04 DIAGNOSIS — R53.83 FATIGUE, UNSPECIFIED TYPE: ICD-10-CM

## 2022-08-09 ENCOUNTER — NURSE ONLY (OUTPATIENT)
Dept: INTERNAL MEDICINE CLINIC | Facility: CLINIC | Age: 82
End: 2022-08-09

## 2022-08-09 DIAGNOSIS — I50.22 SYSTOLIC CHF, CHRONIC (HCC): ICD-10-CM

## 2022-08-09 DIAGNOSIS — R53.83 FATIGUE, UNSPECIFIED TYPE: ICD-10-CM

## 2022-08-09 LAB
CHOLEST SERPL-MCNC: 191 MG/DL
HDLC SERPL-MCNC: 59 MG/DL (ref 40–60)
HDLC SERPL: 3.2 {RATIO}
LDLC SERPL CALC-MCNC: 115.8 MG/DL
TRIGL SERPL-MCNC: 81 MG/DL (ref 35–150)
TSH, 3RD GENERATION: 1.37 UIU/ML (ref 0.36–3.74)
VLDLC SERPL CALC-MCNC: 16.2 MG/DL (ref 6–23)

## 2022-08-16 ENCOUNTER — OFFICE VISIT (OUTPATIENT)
Dept: INTERNAL MEDICINE CLINIC | Facility: CLINIC | Age: 82
End: 2022-08-16
Payer: MEDICARE

## 2022-08-16 VITALS
HEART RATE: 77 BPM | BODY MASS INDEX: 22.66 KG/M2 | HEIGHT: 65 IN | WEIGHT: 136 LBS | TEMPERATURE: 97 F | DIASTOLIC BLOOD PRESSURE: 90 MMHG | OXYGEN SATURATION: 96 % | SYSTOLIC BLOOD PRESSURE: 138 MMHG

## 2022-08-16 DIAGNOSIS — K58.0 IRRITABLE BOWEL SYNDROME WITH DIARRHEA: ICD-10-CM

## 2022-08-16 DIAGNOSIS — M35.01 SJOGREN SYNDROME WITH KERATOCONJUNCTIVITIS (HCC): ICD-10-CM

## 2022-08-16 DIAGNOSIS — C34.91 MALIGNANT NEOPLASM OF UNSPECIFIED PART OF RIGHT BRONCHUS OR LUNG (HCC): ICD-10-CM

## 2022-08-16 DIAGNOSIS — R53.83 FATIGUE, UNSPECIFIED TYPE: Primary | ICD-10-CM

## 2022-08-16 DIAGNOSIS — L29.0 RECTAL ITCHING: ICD-10-CM

## 2022-08-16 LAB
APPEARANCE UR: CLEAR
BILIRUB UR QL: NEGATIVE
COLOR UR: NORMAL
GLUCOSE UR STRIP.AUTO-MCNC: NEGATIVE MG/DL
HGB UR QL STRIP: NEGATIVE
KETONES UR QL STRIP.AUTO: NEGATIVE MG/DL
LEUKOCYTE ESTERASE UR QL STRIP.AUTO: NEGATIVE
NITRITE UR QL STRIP.AUTO: NEGATIVE
PH UR STRIP: 6 [PH] (ref 5–9)
PROT UR STRIP-MCNC: NEGATIVE MG/DL
SP GR UR REFRACTOMETRY: 1.01 (ref 1–1.02)
UROBILINOGEN UR QL STRIP.AUTO: 0.2 EU/DL (ref 0.2–1)

## 2022-08-16 PROCEDURE — 1036F TOBACCO NON-USER: CPT | Performed by: INTERNAL MEDICINE

## 2022-08-16 PROCEDURE — 99214 OFFICE O/P EST MOD 30 MIN: CPT | Performed by: INTERNAL MEDICINE

## 2022-08-16 PROCEDURE — 1123F ACP DISCUSS/DSCN MKR DOCD: CPT | Performed by: INTERNAL MEDICINE

## 2022-08-16 PROCEDURE — G8427 DOCREV CUR MEDS BY ELIG CLIN: HCPCS | Performed by: INTERNAL MEDICINE

## 2022-08-16 PROCEDURE — 1090F PRES/ABSN URINE INCON ASSESS: CPT | Performed by: INTERNAL MEDICINE

## 2022-08-16 PROCEDURE — G8400 PT W/DXA NO RESULTS DOC: HCPCS | Performed by: INTERNAL MEDICINE

## 2022-08-16 PROCEDURE — G8420 CALC BMI NORM PARAMETERS: HCPCS | Performed by: INTERNAL MEDICINE

## 2022-08-16 RX ORDER — HYDROCORTISONE ACETATE, PRAMOXINE HCL 2.5; 1 G/100G; G/100G
CREAM TOPICAL 3 TIMES DAILY
Qty: 30 G | Refills: 4 | Status: SHIPPED | OUTPATIENT
Start: 2022-08-16

## 2022-08-16 RX ORDER — INDOMETHACIN 25 MG/1
25 CAPSULE ORAL
Qty: 30 CAPSULE | Refills: 0 | Status: SHIPPED | OUTPATIENT
Start: 2022-08-16 | End: 2022-09-07 | Stop reason: SDUPTHER

## 2022-08-16 RX ORDER — HYOSCYAMINE SULFATE 0.12 MG/1
0.12 TABLET SUBLINGUAL EVERY 4 HOURS PRN
Qty: 270 EACH | Refills: 1 | Status: SHIPPED | OUTPATIENT
Start: 2022-08-16

## 2022-08-16 ASSESSMENT — ENCOUNTER SYMPTOMS
COUGH: 0
WHEEZING: 0
SHORTNESS OF BREATH: 0

## 2022-08-16 NOTE — PROGRESS NOTES
ASSESSMENT/PLAN:        Evaluation and management of the chronic condition(s) delineated. No negative side effects reported. I have reviewed all the lab results. There are some abnormalities that are either expected or not critical to the patient's health, and are discussed in the office today and are addressed. Please refer to the above assessement and plan narrative and orders and follow up plan. Medication discussed and refilled as needed. Physical exam findings are stable unless otherwise indicated and this is addressed. The most recent lab work and imaging and consultant/urgent care visits and imaging are reviewed and discussed and considered during this visit encounter. 1. Fatigue, unspecified type  2. Malignant neoplasm of unspecified part of right bronchus or lung ([facility] [address]  3. Sjogren syndrome with keratoconjunctivitis (HCC)  -     indomethacin (INDOCIN) 25 MG capsule; Take 1 capsule by mouth in the morning and 1 capsule at noon and 1 capsule in the evening. Take with meals. Do all this for 10 days. , Disp-30 capsule, R-0Print  -     AFL - [place] Arthritis  4. Irritable bowel syndrome with diarrhea  -     Hyoscyamine Sulfate SL 0.125 MG SUBL; Take 0.125 mg by mouth every 4 hours as needed (cramping or diarrhea), Disp-270 each, R-1Print  -     Urinalysis  5. Rectal itching  -     Pramoxine-HC (HYDROCORTISONE-PRAMOXINE) 2.5-1 % CREA cream; Place rectally 3 times daily, Rectal, 3 TIMES DAILY Starting Tue 8/16/2022, Disp-30 g, R-4, Print         On this date, 8/16/22, I have spent 30 minutes reviewing previous notes, test results and face to face with the patient discussing the diagnosis and importance of compliance with the treatment plan as well as documenting on the day of the visit. An electronic signature was used to authenticate this note. -- Fransisca Lopez MD     Return in about 6 months (around 2/16/2023), or if symptoms worsen or fail to improve.     SUBJECTIVE/OBJECTIVE:      HPI: Petra Rosas (: 1940 is a 81 y.o. female, here for evaluation of the following chief complaint(s):   Chief Complaint   Patient presents with    Follow-up     Routine 6 month follow-up and lab review. Referral - General     Pt requesting Rheumatology referral.        Patient is here for follow-up and management of chronic medical conditions, review of recent labs, review of any imaging completed since our last office visit and discuss any consultants opinions or management changes. Labs reviewed. Medication refilled    Oncology notes from Dr. Uche Ross reviewed. Carbo/Alimta/Keytruda. Presumed metastatic adenocarcinoma of lung. Recent imaging reviewed. maintenance pembrolizumab       CHF stable. Volume status stable. EF40-45%       Labs reviewed and discussed and medication refilled as needed for chronic medications during ov or adjusted based on lab results and/or our discussion as appropriate. See discussion. The patient's available records and electronic chart records are reviewed. The PMH, PSH, medications, allergies, medications, FH, health maintenance and vaccination status are all reviewed and updated as appropriate. Records from outside providers have been reviewed, summarized, and considered as noted in the history of present illness, past medical history, and objective data of this note and encounter.           Health Maintenance   Topic Date Due    COVID-19 Vaccine (1) Never done    Pneumococcal 65+ years Vaccine (1 - PCV) Never done    Shingles vaccine (1 of 2) Never done    DEXA (modify frequency per FRAX score)  Never done    Flu vaccine (1) 2022    Annual Wellness Visit (AWV)  2023    Depression Screen  2023    DTaP/Tdap/Td vaccine (2 - Td or Tdap) 2028    Hepatitis A vaccine  Aged Out    Hepatitis B vaccine  Aged Out    Hib vaccine  Aged Out    Meningococcal (ACWY) vaccine  Aged Out     Patient Active Problem List   Diagnosis 04/28/2022 01:19 PM    ALKPHOS 78 04/28/2022 01:19 PM    PROT 6.4 04/28/2022 01:19 PM    LABALBU 3.7 04/28/2022 01:19 PM    GLOB 2.7 04/28/2022 01:19 PM       Lab Results   Component Value Date/Time    CHOL 191 08/09/2022 02:51 PM    HDL 59 08/09/2022 02:51 PM    TRIG 81 08/09/2022 02:51 PM    LDLCALC 115.8 08/09/2022 02:51 PM    VLDL 13 02/08/2022 10:34 AM       No results found for: LABA1C    Lab Results   Component Value Date/Time    TSH 0.738 04/28/2022 01:19 PM    TSH 3.640 10/22/2021 09:34 AM    TSH 1.450 09/09/2021 09:14 AM       No results found for: PSA    Lab Results   Component Value Date/Time    SPECGRAV 1.019 02/15/2022 12:09 PM    PHUR 6.0 02/15/2022 12:09 PM    COLORU Yellow 02/15/2022 12:09 PM    CLARITYU Cloudy 02/15/2022 12:09 PM    LEUKOCYTESUR 2+ 02/15/2022 12:09 PM    PROTEINU Negative 02/15/2022 12:09 PM    GLUCOSEU Negative 02/15/2022 12:09 PM    KETUA Negative 02/15/2022 12:09 PM    BLOODU Trace 02/15/2022 12:09 PM    BILIRUBINUR Negative 02/15/2022 12:09 PM    UROBILINOGEN 0.2 02/15/2022 12:09 PM    NITRU Positive 02/15/2022 12:09 PM    LABMICR See additional order 02/15/2022 12:09 PM           Vitals:    08/16/22 1214   BP: (!) 138/90   Site: Left Upper Arm   Position: Sitting   Cuff Size: Large Adult   Pulse: 77   Temp: 97 °F (36.1 °C)   TempSrc: Temporal   SpO2: 96%   Weight: 136 lb (61.7 kg)   Height: 5' 5\" (1.651 m)     Wt Readings from Last 3 Encounters:   08/16/22 136 lb (61.7 kg)   04/28/22 141 lb 14.4 oz (64.4 kg)   02/15/22 144 lb (65.3 kg)     BP Readings from Last 3 Encounters:   08/16/22 (!) 138/90   04/28/22 128/66   02/15/22 130/72     Physical Exam  Vitals and nursing note reviewed. Constitutional:       Appearance: Normal appearance. She is not ill-appearing. HENT:      Head: Normocephalic and atraumatic. Eyes:      Extraocular Movements: Extraocular movements intact.       Conjunctiva/sclera: Conjunctivae normal.   Cardiovascular:      Rate and Rhythm: Normal rate and

## 2022-08-30 ENCOUNTER — HOSPITAL ENCOUNTER (OUTPATIENT)
Dept: CT IMAGING | Age: 82
Discharge: HOME | End: 2022-09-02

## 2022-08-30 DIAGNOSIS — C34.91 MALIGNANT NEOPLASM OF RIGHT LUNG, UNSPECIFIED PART OF LUNG (HCC): ICD-10-CM

## 2022-09-01 ENCOUNTER — OFFICE VISIT (OUTPATIENT)
Dept: ONCOLOGY | Age: 82
End: 2022-09-01
Payer: MEDICARE

## 2022-09-01 ENCOUNTER — HOSPITAL ENCOUNTER (OUTPATIENT)
Dept: LAB | Age: 82
Discharge: HOME | End: 2022-09-04
Payer: MEDICARE

## 2022-09-01 VITALS
TEMPERATURE: 98.3 F | WEIGHT: 139.2 LBS | SYSTOLIC BLOOD PRESSURE: 123 MMHG | HEIGHT: 65 IN | HEART RATE: 75 BPM | RESPIRATION RATE: 12 BRPM | DIASTOLIC BLOOD PRESSURE: 77 MMHG | BODY MASS INDEX: 23.19 KG/M2 | OXYGEN SATURATION: 94 %

## 2022-09-01 DIAGNOSIS — J91.0 MALIGNANT PLEURAL EFFUSION: ICD-10-CM

## 2022-09-01 DIAGNOSIS — C34.91 MALIGNANT NEOPLASM OF UNSPECIFIED PART OF RIGHT BRONCHUS OR LUNG (HCC): Primary | ICD-10-CM

## 2022-09-01 DIAGNOSIS — C34.91 MALIGNANT NEOPLASM OF RIGHT LUNG, UNSPECIFIED PART OF LUNG (HCC): ICD-10-CM

## 2022-09-01 DIAGNOSIS — R22.31 AXILLARY MASS, RIGHT: ICD-10-CM

## 2022-09-01 DIAGNOSIS — C78.2 METASTASIS TO PLEURA (HCC): ICD-10-CM

## 2022-09-01 DIAGNOSIS — R53.83 FATIGUE, UNSPECIFIED TYPE: ICD-10-CM

## 2022-09-01 LAB
ALBUMIN SERPL-MCNC: 3.5 G/DL (ref 3.2–4.6)
ALBUMIN/GLOB SERPL: 1.3 {RATIO} (ref 1.2–3.5)
ALP SERPL-CCNC: 81 U/L (ref 50–136)
ALT SERPL-CCNC: 20 U/L (ref 12–65)
ANION GAP SERPL CALC-SCNC: 4 MMOL/L (ref 4–13)
AST SERPL-CCNC: 16 U/L (ref 15–37)
BASOPHILS # BLD: 0 K/UL (ref 0–0.2)
BASOPHILS NFR BLD: 0 % (ref 0–2)
BILIRUB SERPL-MCNC: 0.4 MG/DL (ref 0.2–1.1)
BUN SERPL-MCNC: 15 MG/DL (ref 8–23)
CALCIUM SERPL-MCNC: 9.4 MG/DL (ref 8.3–10.4)
CHLORIDE SERPL-SCNC: 105 MMOL/L (ref 101–110)
CO2 SERPL-SCNC: 29 MMOL/L (ref 21–32)
CREAT SERPL-MCNC: 0.8 MG/DL (ref 0.6–1)
DIFFERENTIAL METHOD BLD: ABNORMAL
EOSINOPHIL # BLD: 0.5 K/UL (ref 0–0.8)
EOSINOPHIL NFR BLD: 7 % (ref 0.5–7.8)
ERYTHROCYTE [DISTWIDTH] IN BLOOD BY AUTOMATED COUNT: 12.9 % (ref 11.9–14.6)
GLOBULIN SER CALC-MCNC: 2.8 G/DL (ref 2.3–3.5)
GLUCOSE SERPL-MCNC: 89 MG/DL (ref 65–100)
HCT VFR BLD AUTO: 37.6 % (ref 35.8–46.3)
HGB BLD-MCNC: 12.8 G/DL (ref 11.7–15.4)
IMM GRANULOCYTES # BLD AUTO: 0 K/UL (ref 0–0.5)
IMM GRANULOCYTES NFR BLD AUTO: 1 % (ref 0–5)
LYMPHOCYTES # BLD: 1.4 K/UL (ref 0.5–4.6)
LYMPHOCYTES NFR BLD: 19 % (ref 13–44)
MAGNESIUM SERPL-MCNC: 2.3 MG/DL (ref 1.8–2.4)
MCH RBC QN AUTO: 31.7 PG (ref 26.1–32.9)
MCHC RBC AUTO-ENTMCNC: 34 G/DL (ref 31.4–35)
MCV RBC AUTO: 93.1 FL (ref 79.6–97.8)
MONOCYTES # BLD: 0.7 K/UL (ref 0.1–1.3)
MONOCYTES NFR BLD: 9 % (ref 4–12)
NEUTS SEG # BLD: 4.8 K/UL (ref 1.7–8.2)
NEUTS SEG NFR BLD: 64 % (ref 43–78)
NRBC # BLD: 0 K/UL (ref 0–0.2)
PLATELET # BLD AUTO: 155 K/UL (ref 150–450)
PMV BLD AUTO: 10.1 FL (ref 9.4–12.3)
POTASSIUM SERPL-SCNC: 4.3 MMOL/L (ref 3.5–5.1)
PROT SERPL-MCNC: 6.3 G/DL (ref 6.3–8.2)
RBC # BLD AUTO: 4.04 M/UL (ref 4.05–5.2)
SODIUM SERPL-SCNC: 138 MMOL/L (ref 136–145)
TSH, 3RD GENERATION: 1.17 UIU/ML (ref 0.36–3)
WBC # BLD AUTO: 7.4 K/UL (ref 4.3–11.1)

## 2022-09-01 PROCEDURE — G8427 DOCREV CUR MEDS BY ELIG CLIN: HCPCS | Performed by: INTERNAL MEDICINE

## 2022-09-01 PROCEDURE — 80053 COMPREHEN METABOLIC PANEL: CPT

## 2022-09-01 PROCEDURE — 36415 COLL VENOUS BLD VENIPUNCTURE: CPT

## 2022-09-01 PROCEDURE — 85025 COMPLETE CBC W/AUTO DIFF WBC: CPT

## 2022-09-01 PROCEDURE — 1036F TOBACCO NON-USER: CPT | Performed by: INTERNAL MEDICINE

## 2022-09-01 PROCEDURE — G8420 CALC BMI NORM PARAMETERS: HCPCS | Performed by: INTERNAL MEDICINE

## 2022-09-01 PROCEDURE — 1090F PRES/ABSN URINE INCON ASSESS: CPT | Performed by: INTERNAL MEDICINE

## 2022-09-01 PROCEDURE — 84443 ASSAY THYROID STIM HORMONE: CPT

## 2022-09-01 PROCEDURE — G8400 PT W/DXA NO RESULTS DOC: HCPCS | Performed by: INTERNAL MEDICINE

## 2022-09-01 PROCEDURE — 83735 ASSAY OF MAGNESIUM: CPT

## 2022-09-01 PROCEDURE — 1123F ACP DISCUSS/DSCN MKR DOCD: CPT | Performed by: INTERNAL MEDICINE

## 2022-09-01 PROCEDURE — 99214 OFFICE O/P EST MOD 30 MIN: CPT | Performed by: INTERNAL MEDICINE

## 2022-09-01 ASSESSMENT — PATIENT HEALTH QUESTIONNAIRE - PHQ9
SUM OF ALL RESPONSES TO PHQ QUESTIONS 1-9: 0
2. FEELING DOWN, DEPRESSED OR HOPELESS: 0
SUM OF ALL RESPONSES TO PHQ QUESTIONS 1-9: 0

## 2022-09-01 NOTE — PROGRESS NOTES
HEMATOLOGY/ONCOLOGY FOLLOWUP  VISIT      Patient Name: Susan Peñaloza             Date of Visit: 2022  : 1940  Age:82 y.o. Presenting Complaint:  Susan Peñaloza  is seen in follow-up for lung cancer. History of Present Illness:   Ms. Beverly Solano was seen for the first time in our office in 2020. She was a woman of generally good health prior to recent events. She had some chronic medical issues none of which intruded into  her day-to-day function. Her presenting illness happened in the context of the death of her  in 2020. She was his primary caregiver and had become somewhat depleted during that process attributing her symptoms to the stress involved with  his care. Beginning in mid October, she developed what she felt was an evolving bronchitis with cough and general malaise. Over the subsequent week she was treated with antibiotics and inhalers without benefit ultimately getting to the point where she  felt she could not take in a deep breath and presented to the emergency department on 2020. At that time her resting oxygen saturation was 87%. A pleural effusion on the right side was seen on chest x-ray and confirmed by CT scan with collapse  of the right lower lobe as well as a majority of the right middle lobe with mass-effect on the mediastinum. She underwent a thoracentesis removing initially 2800 cc of fluid and then on a subsequent day 1400 cc of fluid with significant improvement in  her sense of wellbeing. The fluid was described by the patient as bloody and subsequent pathology demonstrated the presence of an adenocarcinoma which stained positively for cytokeratin 7, Napsin, and TTF-1. It was negative for cytokeratin 20. CT scanning  of the neck abdomen and pelvis demonstrated no clear site of origin for this malignancy.   There was a suggestion of right-sided pleural nodularity although there remains some atelectatic change within the lung making a determination of primary lung lesion  on the right side difficult. She has not smoked since 1986 and had been an approximately 1 pack/day smoker prior to that. Her major complaint at the time of her initial presentation was that of extreme fatigue. A PET scan was performed with no definitively  obvious primary pulmonary malignancy. There is the right-sided pleural effusion as well as evidence of pleural nodularity which is somewhat FDG avid. There is no evidence of dissemination of disease elsewhere. A peripheral blood was sent for cell free  DNA analysis and disclosed a TP53 mutation but nothing else that was actionable. Began treatment with pembrolizumab paclitaxel and carboplatin in mid December 2020. Following 4 cycles of treatment, she underwent a CT scan. The study showed complete  disappearance of the right-sided pleural effusion. In addition, there was some pleural nodularity that seemed somewhat better on the scan. There were scattered pulmonary nodules which seemed stable as best one can compared to the prior study when there  was so much fluid present. She then began treatment with pembrolizumab alone. She received this medication for 6 months through September 2021. At that time, she began to have severe arthralgias which point the pembrolizumab was discontinued and she  was placed on prednisone with relief. She returns today for follow-up. She was last seen in April 2022. Overall, she feels well. She has some generalized fatigue and episodic achiness. She has no fever, night sweats or other constitutional symptoms. She has no cough or shortness of breath. Prior to today's visit, the patient underwent a CT scan to evaluate the status of her malignancy. This was largely stable with no evidence of new findings within the chest.  There was however a relatively large right axillary lymph node seen approaching 3 cm in size.   The polyp was that this was not present in January 2022. I reviewed the CT and compared it. There was a study done in April and in fact I do believe this lymph node was present but somewhat smaller. She has no pain in the area. Following the reading of the CT scan which she saw on My Chart, she indicated that she palpated a mass in the right axilla but feels that it has gotten smaller since she initially felt it. Medications:   Current Outpatient Medications   Medication Sig Dispense Refill    Hyoscyamine Sulfate SL 0.125 MG SUBL Take 0.125 mg by mouth every 4 hours as needed (cramping or diarrhea) (Patient taking differently: Take 0.125 mg by mouth every 4 hours as needed (cramping or diarrhea) PRN) 270 each 1    Pramoxine-HC (HYDROCORTISONE-PRAMOXINE) 2.5-1 % CREA cream Place rectally 3 times daily (Patient taking differently: Place rectally 3 times daily PRN) 30 g 4    indomethacin (INDOCIN) 25 MG capsule Take 1 capsule by mouth in the morning and 1 capsule at noon and 1 capsule in the evening. Take with meals. Do all this for 10 days.  30 capsule 0    esomeprazole (NEXIUM) 20 MG delayed release capsule Take 20 mg by mouth      estradiol (ESTRACE) 0.1 MG/GM vaginal cream INSERT 1 GRAM TWICE A WEEK VAGINALLY AS DIRECTED      latanoprost (XALATAN) 0.005 % ophthalmic solution Apply 1 drop to eye      timolol (TIMOPTIC) 0.5 % ophthalmic solution PLACE 1 DROP IN BOTH EYES IN THE MORNING      triamcinolone (NASACORT) 55 MCG/ACT nasal inhaler 2 sprays daily      acetaminophen (TYLENOL) 325 MG tablet Take 650 mg by mouth every 6 hours as needed (Patient not taking: Reported on 9/1/2022)      cetirizine (ZYRTEC) 10 MG tablet Take by mouth every evening (Patient not taking: Reported on 9/1/2022)      cycloSPORINE (RESTASIS) 0.05 % ophthalmic emulsion Apply 1 drop to eye every 12 hours (Patient not taking: Reported on 9/1/2022)      lidocaine-prilocaine (EMLA) 2.5-2.5 % cream Apply to port about 45 minutes prior to access       No current facility-administered medications for this visit. Allergies: Allergies   Allergen Reactions    Levonorgestrel-Ethinyl Estrad Other (See Comments)     Patient denies ever taking medication    Ciprofibrate Diarrhea    Ciprofloxacin Diarrhea, Other (See Comments) and Nausea And Vomiting    Penicillins Rash       Review of Systems: The Review of Systems is documented in full in the internal medical record. All systems are negative other than for those noted above. Past Medical History:  Documented in electronic medical record    Past Surgical History:  Documented in electronic medical record    Social History:  Documented in electronic medical record    Family History:  Documented in electronic medical record      Physical Examination:  General Appearance: Healthy appearing patient in no acute distress. Vital signs: /77 (Site: Left Upper Arm, Position: Standing)   Pulse 75   Temp 98.3 °F (36.8 °C)   Resp 12   Ht 5' 5\" (1.651 m)   Wt 139 lb 3.2 oz (63.1 kg)   SpO2 94%   BMI 23.16 kg/m²     Performance status: ECOG Level: 1  Distress  Screening Score: PHQ-9 Total Score: 0 (9/1/2022  2:16 PM)    Pain score: Pain Score:   0 - No pain (Fatigue- 7)  HEENT: No oral exam performed. Neck: Supple. There is no thyromegaly. Lymph nodes: There is definitely an approximately 3 cm lymph node in the low axilla on the right. Breasts: Not examined. Lungs: The lungs are clear to auscultation and percussion. There is no egophony. There is no chest wall tenderness and no  use of accessory respiratory musculature. Heart: There is no jugular venous distention. The rate is normal and rhythm regular. The S1 and S2 are normal and there are no murmurs or rubs. Abdomen: Soft, non-tender, bowel sounds present and normal, no appreciated hepatosplenomegaly. No palpable masses. Skin: No rash, petechiae or ecchymoses. No evidence of malignancy. Extremities: No cyanosis, clubbing or edema. Labs/Imaging:  Lab Results   Component Value Date/Time    WBC 7.4 09/01/2022 01:50 PM    HGB 12.8 09/01/2022 01:50 PM    HCT 37.6 09/01/2022 01:50 PM     09/01/2022 01:50 PM    MCV 93.1 09/01/2022 01:50 PM       Lab Results   Component Value Date/Time     09/01/2022 01:50 PM    K 4.3 09/01/2022 01:50 PM     09/01/2022 01:50 PM    CO2 29 09/01/2022 01:50 PM    BUN 15 09/01/2022 01:50 PM    GFRAA >60 09/01/2022 01:50 PM    GLOB 2.8 09/01/2022 01:50 PM    ALT 20 09/01/2022 01:50 PM       Above results reviewed with patient. ASSESSMENT:   Based on the immunohistochemistry stains of the right pleural fluid, it would appear that this patient suffers from a metastatic adenocarcinoma of the lung. The primary source is however not evident  on three-dimensional imaging. However, in the absence of an alternative primary I felt it appropriate to consider treatment designed for an adenocarcinoma of the lung. She  received 4 cycles of pembrolizumab pemetrexed and carboplatin. She then went  on to receive pembrolizumab which she took through September 2021. She developed rather significant arthralgias and was placed on prednisone with relief. She has been followed expectantly since that time. Her most recent scans now show the presence of to 3 cm mass in her right axilla. I advocated for a needle biopsy of this lesion following this visit. The patient however feels that it may have gotten smaller since she first identified it and wishes to wait and till she has another scan in 2 months. PLAN:  At this point, I am deferring to her wishes and delaying a biopsy for 2 months until a repeat scan can be accomplished. If this is malignancy presumably it represents the previously diagnosed carcinoma but I cannot rule out the possibility that there might be a breast cancer or other malignancy causing this axillary lesion.         RESUSCITATION DIRECTIVES/HOSPICE CARE;  Full Support Jesse Carrillo MD [facility]    Oncology and Hematology   [address]  P [phone]  F [phone]  [email or web address]

## 2022-09-01 NOTE — PATIENT INSTRUCTIONS
Patient Instructions from Today's Visit    Reason for Visit:  Follow up-Lung    Diagnosis Information:  [email or web address] net/about-us/asco-answers-patient-education-materials/hikd-unragyl-nrzb-sheets      Plan:  We do think this lymph node looks a bit larger. The scan is normal with the exception of the lymph node. We could get a PET scan or biopsy to determine what this is exactly, but that would be up to you. We will monitor this for now with a scan if that's what you prefer.     Follow Up:  Dr Halima Badillo in 2 months    Recent Lab Results:  Hospital Outpatient Visit on 09/01/2022   Component Date Value Ref Range Status    WBC 09/01/2022 7.4  4.3 - 11.1 K/uL Final    RBC 09/01/2022 4.04 (A) 4.05 - 5.2 M/uL Final    Hemoglobin 09/01/2022 12.8  11.7 - 15.4 g/dL Final    Hematocrit 09/01/2022 37.6  35.8 - 46.3 % Final    MCV 09/01/2022 93.1  79.6 - 97.8 FL Final    MCH 09/01/2022 31.7  26.1 - 32.9 PG Final    MCHC 09/01/2022 34.0  31.4 - 35.0 g/dL Final    RDW 09/01/2022 12.9  11.9 - 14.6 % Final    Platelets 18/77/5483 155  150 - 450 K/uL Final    MPV 09/01/2022 10.1  9.4 - 12.3 FL Final    nRBC 09/01/2022 0.00  0.0 - 0.2 K/uL Final    **Note: Absolute NRBC parameter is now reported with Hemogram**    Seg Neutrophils 09/01/2022 64  43 - 78 % Final    Lymphocytes 09/01/2022 19  13 - 44 % Final    Monocytes 09/01/2022 9  4.0 - 12.0 % Final    Eosinophils % 09/01/2022 7  0.5 - 7.8 % Final    Basophils 09/01/2022 0  0.0 - 2.0 % Final    Immature Granulocytes 09/01/2022 1  0.0 - 5.0 % Final    Segs Absolute 09/01/2022 4.8  1.7 - 8.2 K/UL Final    Absolute Lymph # 09/01/2022 1.4  0.5 - 4.6 K/UL Final    Absolute Mono # 09/01/2022 0.7  0.1 - 1.3 K/UL Final    Absolute Eos # 09/01/2022 0.5  0.0 - 0.8 K/UL Final    Basophils Absolute 09/01/2022 0.0  0.0 - 0.2 K/UL Final    Absolute Immature Granulocyte 09/01/2022 0.0  0.0 - 0.5 K/UL Final    Differential Type 09/01/2022 AUTOMATED    Final    Sodium 09/01/2022 138  136 - 145 mmol/L Final    Potassium 09/01/2022 4.3  3.5 - 5.1 mmol/L Final    Chloride 09/01/2022 105  101 - 110 mmol/L Final    CO2 09/01/2022 29  21 - 32 mmol/L Final    Anion Gap 09/01/2022 4  4 - 13 mmol/L Final    Glucose 09/01/2022 89  65 - 100 mg/dL Final    BUN 09/01/2022 15  8 - 23 MG/DL Final    Creatinine 09/01/2022 0.80  0.6 - 1.0 MG/DL Final    GFR  09/01/2022 >60  >60 ml/min/1.73m2 Final    GFR Non- 09/01/2022 >60  >60 ml/min/1.73m2 Final    Comment:      Estimated GFR is calculated using the Modification of Diet in Renal Disease (MDRD) Study equation, reported for both  Americans (GFRAA) and non- Americans (GFRNA), and normalized to 1.73m2 body surface area. The physician must decide which value applies to the patient. The MDRD study equation should only be used in individuals age 25 or older. It has not been validated for the following: pregnant women, patients with serious comorbid conditions,or on certain medications, or persons with extremes of body size, muscle mass, or nutritional status.       Calcium 09/01/2022 9.4  8.3 - 10.4 MG/DL Final    Total Bilirubin 09/01/2022 0.4  0.2 - 1.1 MG/DL Final    ALT 09/01/2022 20  12 - 65 U/L Final    AST 09/01/2022 16  15 - 37 U/L Final    Alk Phosphatase 09/01/2022 81  50 - 136 U/L Final    Total Protein 09/01/2022 6.3  6.3 - 8.2 g/dL Final    Albumin 09/01/2022 3.5  3.2 - 4.6 g/dL Final    Globulin 09/01/2022 2.8  2.3 - 3.5 g/dL Final    Albumin/Globulin Ratio 09/01/2022 1.3  1.2 - 3.5   Final    Magnesium 09/01/2022 2.3  1.8 - 2.4 mg/dL Final    TSH, 3RD GENERATION 09/01/2022 1.170  0.358 - 3 uIU/mL Final         Treatment Summary has been discussed and given to patient: n/a        -------------------------------------------------------------------------------------------------------------------  Please call our office at [phone] if you have any  of the following symptoms:   Fever of 100.5 or greater  Chills  Shortness of breath  Swelling or pain in one leg    After office hours an answering service is available and will contact a provider for emergencies or if you are experiencing any of the above symptoms. Patient does express an interest in My Chart. My Chart log in information explained on the after visit summary printout at the [facility] [address] desk.     Osiris Rose MA

## 2022-09-06 ENCOUNTER — PATIENT MESSAGE (OUTPATIENT)
Dept: INTERNAL MEDICINE CLINIC | Facility: CLINIC | Age: 82
End: 2022-09-06

## 2022-09-06 DIAGNOSIS — M35.01 SJOGREN SYNDROME WITH KERATOCONJUNCTIVITIS (HCC): ICD-10-CM

## 2022-09-06 NOTE — TELEPHONE ENCOUNTER
81.6 From: Rogers Mcfarlane  To: Dr. Anca Thomas: 9/6/2022 3:14 PM EDT  Subject: Indomethacin    Hi Dr. Vladimir Gongora,  I hope you had a good holiday and I am sorry to bother you. The Indomethacin has been a great help in relieving the pain in my left hip and SI join. I cut it back to once a day and I only have have 2caps and I do not see [facility] [place] [facility] until SEP. 15.  Would it be possible to call me in some more Indomethacin? Thank you for your help.   Hedy Rodriguez

## 2022-09-07 RX ORDER — INDOMETHACIN 25 MG/1
25 CAPSULE ORAL
Qty: 90 CAPSULE | Refills: 0 | Status: SHIPPED | OUTPATIENT
Start: 2022-09-07 | End: 2022-10-07

## 2022-09-07 NOTE — TELEPHONE ENCOUNTER
Orders Placed This Encounter    indomethacin (INDOCIN) 25 MG capsule     Sig: Take 1 capsule by mouth 3 times daily (with meals)     Dispense:  90 capsule     Refill:  0       MyChart message sent to the patient. Eliseo Colunga MD

## 2022-11-01 ENCOUNTER — HOSPITAL ENCOUNTER (OUTPATIENT)
Dept: CT IMAGING | Age: 82
Discharge: HOME | End: 2022-11-04

## 2022-11-01 DIAGNOSIS — C34.91 MALIGNANT NEOPLASM OF UNSPECIFIED PART OF RIGHT BRONCHUS OR LUNG (HCC): ICD-10-CM

## 2022-11-01 DIAGNOSIS — R53.83 FATIGUE, UNSPECIFIED TYPE: ICD-10-CM

## 2022-11-01 DIAGNOSIS — C34.91 MALIGNANT NEOPLASM OF UNSPECIFIED PART OF RIGHT BRONCHUS OR LUNG (HCC): Primary | ICD-10-CM

## 2022-11-03 ENCOUNTER — OFFICE VISIT (OUTPATIENT)
Dept: ONCOLOGY | Age: 82
End: 2022-11-03
Payer: MEDICARE

## 2022-11-03 ENCOUNTER — HOSPITAL ENCOUNTER (OUTPATIENT)
Dept: LAB | Age: 82
Discharge: HOME | End: 2022-11-06
Payer: MEDICARE

## 2022-11-03 VITALS
WEIGHT: 134.5 LBS | DIASTOLIC BLOOD PRESSURE: 78 MMHG | OXYGEN SATURATION: 97 % | RESPIRATION RATE: 12 BRPM | TEMPERATURE: 98 F | HEART RATE: 81 BPM | HEIGHT: 65 IN | SYSTOLIC BLOOD PRESSURE: 129 MMHG | BODY MASS INDEX: 22.41 KG/M2

## 2022-11-03 DIAGNOSIS — R53.83 FATIGUE, UNSPECIFIED TYPE: ICD-10-CM

## 2022-11-03 DIAGNOSIS — C34.81 MALIGNANT NEOPLASM OF OVERLAPPING SITES OF RIGHT LUNG (HCC): ICD-10-CM

## 2022-11-03 DIAGNOSIS — R22.31 AXILLARY MASS, RIGHT: Primary | ICD-10-CM

## 2022-11-03 DIAGNOSIS — C34.91 MALIGNANT NEOPLASM OF UNSPECIFIED PART OF RIGHT BRONCHUS OR LUNG (HCC): ICD-10-CM

## 2022-11-03 LAB
ALBUMIN SERPL-MCNC: 3.9 G/DL (ref 3.2–4.6)
ALBUMIN/GLOB SERPL: 1.6 {RATIO} (ref 0.4–1.6)
ALP SERPL-CCNC: 81 U/L (ref 50–136)
ALT SERPL-CCNC: 21 U/L (ref 12–65)
ANION GAP SERPL CALC-SCNC: 3 MMOL/L (ref 2–11)
AST SERPL-CCNC: 14 U/L (ref 15–37)
BASOPHILS # BLD: 0 K/UL (ref 0–0.2)
BASOPHILS NFR BLD: 1 % (ref 0–2)
BILIRUB SERPL-MCNC: 0.4 MG/DL (ref 0.2–1.1)
BUN SERPL-MCNC: 10 MG/DL (ref 8–23)
CALCIUM SERPL-MCNC: 9.3 MG/DL (ref 8.3–10.4)
CHLORIDE SERPL-SCNC: 105 MMOL/L (ref 101–110)
CO2 SERPL-SCNC: 30 MMOL/L (ref 21–32)
CREAT SERPL-MCNC: 0.7 MG/DL (ref 0.6–1)
DIFFERENTIAL METHOD BLD: ABNORMAL
EOSINOPHIL # BLD: 0.6 K/UL (ref 0–0.8)
EOSINOPHIL NFR BLD: 8 % (ref 0.5–7.8)
ERYTHROCYTE [DISTWIDTH] IN BLOOD BY AUTOMATED COUNT: 13.9 % (ref 11.9–14.6)
GLOBULIN SER CALC-MCNC: 2.5 G/DL (ref 2.8–4.5)
GLUCOSE SERPL-MCNC: 96 MG/DL (ref 65–100)
HCT VFR BLD AUTO: 40.1 % (ref 35.8–46.3)
HGB BLD-MCNC: 13.2 G/DL (ref 11.7–15.4)
IMM GRANULOCYTES # BLD AUTO: 0 K/UL (ref 0–0.5)
IMM GRANULOCYTES NFR BLD AUTO: 1 % (ref 0–5)
LYMPHOCYTES # BLD: 1.6 K/UL (ref 0.5–4.6)
LYMPHOCYTES NFR BLD: 22 % (ref 13–44)
MAGNESIUM SERPL-MCNC: 2.3 MG/DL (ref 1.8–2.4)
MCH RBC QN AUTO: 31.7 PG (ref 26.1–32.9)
MCHC RBC AUTO-ENTMCNC: 32.9 G/DL (ref 31.4–35)
MCV RBC AUTO: 96.2 FL (ref 82–102)
MONOCYTES # BLD: 0.7 K/UL (ref 0.1–1.3)
MONOCYTES NFR BLD: 9 % (ref 4–12)
NEUTS SEG # BLD: 4.4 K/UL (ref 1.7–8.2)
NEUTS SEG NFR BLD: 60 % (ref 43–78)
NRBC # BLD: 0 K/UL (ref 0–0.2)
PLATELET # BLD AUTO: 143 K/UL (ref 150–450)
PMV BLD AUTO: 10.2 FL (ref 9.4–12.3)
POTASSIUM SERPL-SCNC: 4 MMOL/L (ref 3.5–5.1)
PROT SERPL-MCNC: 6.4 G/DL (ref 6.3–8.2)
RBC # BLD AUTO: 4.17 M/UL (ref 4.05–5.2)
SODIUM SERPL-SCNC: 138 MMOL/L (ref 133–143)
TSH, 3RD GENERATION: 1.02 UIU/ML (ref 0.36–3)
WBC # BLD AUTO: 7.3 K/UL (ref 4.3–11.1)

## 2022-11-03 PROCEDURE — G8420 CALC BMI NORM PARAMETERS: HCPCS | Performed by: INTERNAL MEDICINE

## 2022-11-03 PROCEDURE — G8484 FLU IMMUNIZE NO ADMIN: HCPCS | Performed by: INTERNAL MEDICINE

## 2022-11-03 PROCEDURE — 83735 ASSAY OF MAGNESIUM: CPT

## 2022-11-03 PROCEDURE — 99214 OFFICE O/P EST MOD 30 MIN: CPT | Performed by: INTERNAL MEDICINE

## 2022-11-03 PROCEDURE — 85025 COMPLETE CBC W/AUTO DIFF WBC: CPT

## 2022-11-03 PROCEDURE — 36415 COLL VENOUS BLD VENIPUNCTURE: CPT

## 2022-11-03 PROCEDURE — 1123F ACP DISCUSS/DSCN MKR DOCD: CPT | Performed by: INTERNAL MEDICINE

## 2022-11-03 PROCEDURE — 80053 COMPREHEN METABOLIC PANEL: CPT

## 2022-11-03 PROCEDURE — 1090F PRES/ABSN URINE INCON ASSESS: CPT | Performed by: INTERNAL MEDICINE

## 2022-11-03 PROCEDURE — G8400 PT W/DXA NO RESULTS DOC: HCPCS | Performed by: INTERNAL MEDICINE

## 2022-11-03 PROCEDURE — 1036F TOBACCO NON-USER: CPT | Performed by: INTERNAL MEDICINE

## 2022-11-03 PROCEDURE — 84443 ASSAY THYROID STIM HORMONE: CPT

## 2022-11-03 PROCEDURE — G8427 DOCREV CUR MEDS BY ELIG CLIN: HCPCS | Performed by: INTERNAL MEDICINE

## 2022-11-03 ASSESSMENT — PATIENT HEALTH QUESTIONNAIRE - PHQ9
SUM OF ALL RESPONSES TO PHQ QUESTIONS 1-9: 1
2. FEELING DOWN, DEPRESSED OR HOPELESS: 1
SUM OF ALL RESPONSES TO PHQ QUESTIONS 1-9: 1

## 2022-11-03 NOTE — PATIENT INSTRUCTIONS
Patient Instructions from Today's Visit    Reason for Visit:  Follow up-Lung    Diagnosis Information:  [email or web address] net/about-us/asco-answers-patient-education-materials/yidh-janjitx-upql-sheets      Plan: We will set you up to have a biopsy in IR.     Follow Up:  Dr Lesa Arthur after biopsy    Recent Lab Results:  Hospital Outpatient Visit on 11/03/2022   Component Date Value Ref Range Status    WBC 11/03/2022 7.3  4.3 - 11.1 K/uL Final    RBC 11/03/2022 4.17  4.05 - 5.2 M/uL Final    Hemoglobin 11/03/2022 13.2  11.7 - 15.4 g/dL Final    Hematocrit 11/03/2022 40.1  35.8 - 46.3 % Final    MCV 11/03/2022 96.2  82.0 - 102.0 FL Final    MCH 11/03/2022 31.7  26.1 - 32.9 PG Final    MCHC 11/03/2022 32.9  31.4 - 35.0 g/dL Final    RDW 11/03/2022 13.9  11.9 - 14.6 % Final    Platelets 92/77/1303 143 (A)  150 - 450 K/uL Final    MPV 11/03/2022 10.2  9.4 - 12.3 FL Final    nRBC 11/03/2022 0.00  0.0 - 0.2 K/uL Final    **Note: Absolute NRBC parameter is now reported with Hemogram**    Differential Type 11/03/2022 AUTOMATED    Final    Seg Neutrophils 11/03/2022 60  43 - 78 % Final    Lymphocytes 11/03/2022 22  13 - 44 % Final    Monocytes 11/03/2022 9  4.0 - 12.0 % Final    Eosinophils % 11/03/2022 8 (A)  0.5 - 7.8 % Final    Basophils 11/03/2022 1  0.0 - 2.0 % Final    Immature Granulocytes 11/03/2022 1  0.0 - 5.0 % Final    Segs Absolute 11/03/2022 4.4  1.7 - 8.2 K/UL Final    Absolute Lymph # 11/03/2022 1.6  0.5 - 4.6 K/UL Final    Absolute Mono # 11/03/2022 0.7  0.1 - 1.3 K/UL Final    Absolute Eos # 11/03/2022 0.6  0.0 - 0.8 K/UL Final    Basophils Absolute 11/03/2022 0.0  0.0 - 0.2 K/UL Final    Absolute Immature Granulocyte 11/03/2022 0.0  0.0 - 0.5 K/UL Final    Sodium 11/03/2022 138  133 - 143 mmol/L Final    Potassium 11/03/2022 4.0  3.5 - 5.1 mmol/L Final    Chloride 11/03/2022 105  101 - 110 mmol/L Final    CO2 11/03/2022 30  21 - 32 mmol/L Final    Anion Gap 11/03/2022 3  2 - 11 mmol/L Final    Glucose 11/03/2022 96  65 - 100 mg/dL Final    BUN 11/03/2022 10  8 - 23 MG/DL Final    Creatinine 11/03/2022 0.70  0.6 - 1.0 MG/DL Final    EstTwin Filt Rate 11/03/2022 >60  >60 ml/min/1.73m2 Final    Comment:      Pediatric calculator link: [employer].at. org/professionals/kdoqi/gfr_calculatorped       Effective Oct 3, 2022       These results are not intended for use in patients <25years of age. eGFR results are calculated without a race factor using  the 2021 CKD-EPI equation. Careful clinical correlation is recommended, particularly when comparing to results calculated using previous equations. The CKD-EPI equation is less accurate in patients with extremes of muscle mass, extra-renal metabolism of creatinine, excessive creatine ingestion, or following therapy that affects renal tubular secretion. Calcium 11/03/2022 9.3  8.3 - 10.4 MG/DL Final    Total Bilirubin 11/03/2022 0.4  0.2 - 1.1 MG/DL Final    ALT 11/03/2022 21  12 - 65 U/L Final    AST 11/03/2022 14 (A)  15 - 37 U/L Final    Alk Phosphatase 11/03/2022 81  50 - 136 U/L Final    Total Protein 11/03/2022 6.4  6.3 - 8.2 g/dL Final    Albumin 11/03/2022 3.9  3.2 - 4.6 g/dL Final    Globulin 11/03/2022 2.5 (A)  2.8 - 4.5 g/dL Final    Albumin/Globulin Ratio 11/03/2022 1.6  0.4 - 1.6   Final    Magnesium 11/03/2022 2.3  1.8 - 2.4 mg/dL Final    TSH, 3RD GENERATION 11/03/2022 1.020  0.358 - 3 uIU/mL Final         Treatment Summary has been discussed and given to patient: n/a        -------------------------------------------------------------------------------------------------------------------  Please call our office at [phone] if you have any  of the following symptoms:   Fever of 100.5 or greater  Chills  Shortness of breath  Swelling or pain in one leg    After office hours an answering service is available and will contact a provider for emergencies or if you are experiencing any of the above symptoms.     Patient does express an interest in My Chart. My Chart log in information explained on the after visit summary printout at the . [facility] [address] desk.     Mark Nieto MA

## 2022-11-04 ENCOUNTER — TELEPHONE (OUTPATIENT)
Dept: ONCOLOGY | Age: 82
End: 2022-11-04

## 2022-11-04 NOTE — PROGRESS NOTES
HEMATOLOGY/ONCOLOGY FOLLOWUP  VISIT      Patient Name: Alexa Guerrero             Date of Visit: 11/3/2022  : 1940  Age:82 y.o. Presenting Complaint:  Alexa Guerrero  is seen in follow-up for lung cancer. History of Present Illness:   Ms. Kiah Mohr was seen for the first time in our office in 2020. She was a woman of generally good health prior to recent events. She had some chronic medical issues none of which intruded into  her day-to-day function. Her presenting illness happened in the context of the death of her  in 2020. She was his primary caregiver and had become somewhat depleted during that process attributing her symptoms to the stress involved with  his care. Beginning in mid October, she developed what she felt was an evolving bronchitis with cough and general malaise. Over the subsequent week she was treated with antibiotics and inhalers without benefit ultimately getting to the point where she  felt she could not take in a deep breath and presented to the emergency department on 2020. At that time her resting oxygen saturation was 87%. A pleural effusion on the right side was seen on chest x-ray and confirmed by CT scan with collapse  of the right lower lobe as well as a majority of the right middle lobe with mass-effect on the mediastinum. She underwent a thoracentesis removing initially 2800 cc of fluid and then on a subsequent day 1400 cc of fluid with significant improvement in  her sense of wellbeing. The fluid was described by the patient as bloody and subsequent pathology demonstrated the presence of an adenocarcinoma which stained positively for cytokeratin 7, Napsin, and TTF-1. It was negative for cytokeratin 20. CT scanning  of the neck abdomen and pelvis demonstrated no clear site of origin for this malignancy.   There was a suggestion of right-sided pleural nodularity although there remains some atelectatic change within the lung making a determination of primary lung lesion  on the right side difficult. She has not smoked since 1986 and had been an approximately 1 pack/day smoker prior to that. Her major complaint at the time of her initial presentation was that of extreme fatigue. A PET scan was performed with no definitively  obvious primary pulmonary malignancy. There is the right-sided pleural effusion as well as evidence of pleural nodularity which is somewhat FDG avid. There is no evidence of dissemination of disease elsewhere. A peripheral blood was sent for cell free  DNA analysis and disclosed a TP53 mutation but nothing else that was actionable. Began treatment with pembrolizumab paclitaxel and carboplatin in mid December 2020. Following 4 cycles of treatment, she underwent a CT scan. The study showed complete  disappearance of the right-sided pleural effusion. In addition, there was some pleural nodularity that seemed somewhat better on the scan. There were scattered pulmonary nodules which seemed stable as best one can compared to the prior study when there  was so much fluid present. She then began treatment with pembrolizumab alone. She received this medication for 6 months through September 2021. At that time, she began to have severe arthralgias which point the pembrolizumab was discontinued and she  was placed on prednisone with relief. She returns today for follow-up. At the time of her last visit, she had undergone a follow-up CT scan which raised the question of a right axillary mass. This mass was palpable but the patient indicated that it had been larger and was at the time of her last visit showing signs of shrinkage. Although I had advocated a biopsy at that time, she wished to monitor the progress of this lesion since it seem to be decreasing on its own. Today's visit is a result of that prior decision.   She follows up here for an assessment and discussion about the recent CT scan. She is having some hip discomfort the history of which is relatively long and has come and gone intermittently. She is however complaining of soreness in her right axilla radiating into the shoulder and the upper right arm. She clearly feels the mass is now getting bigger. This was confirmed by the CT scan which had shown the lesion had increased in size by over a centimeter. In addition, there seem to be a left-sided pulmonary nodule that was slightly larger at 6 mm. Current Outpatient Medications   Medication Sig Dispense Refill    Hyoscyamine Sulfate SL 0.125 MG SUBL Take 0.125 mg by mouth every 4 hours as needed (cramping or diarrhea) (Patient taking differently: Take 0.125 mg by mouth every 4 hours as needed (cramping or diarrhea) PRN) 270 each 1    Pramoxine-HC (HYDROCORTISONE-PRAMOXINE) 2.5-1 % CREA cream Place rectally 3 times daily (Patient taking differently: Place rectally 3 times daily PRN) 30 g 4    esomeprazole (NEXIUM) 20 MG delayed release capsule Take 20 mg by mouth      estradiol (ESTRACE) 0.1 MG/GM vaginal cream INSERT 1 GRAM TWICE A WEEK VAGINALLY AS DIRECTED      latanoprost (XALATAN) 0.005 % ophthalmic solution Apply 1 drop to eye      timolol (TIMOPTIC) 0.5 % ophthalmic solution PLACE 1 DROP IN BOTH EYES IN THE MORNING      triamcinolone (NASACORT) 55 MCG/ACT nasal inhaler 2 sprays daily      indomethacin (INDOCIN) 25 MG capsule Take 1 capsule by mouth 3 times daily (with meals) 90 capsule 0    acetaminophen (TYLENOL) 325 MG tablet Take 650 mg by mouth every 6 hours as needed (Patient not taking: No sig reported)      cetirizine (ZYRTEC) 10 MG tablet Take by mouth every evening (Patient not taking: No sig reported)      cycloSPORINE (RESTASIS) 0.05 % ophthalmic emulsion Apply 1 drop to eye every 12 hours (Patient not taking: Reported on 9/1/2022)       No current facility-administered medications for this visit. Allergies:   Allergies   Allergen Reactions Levonorgestrel-Ethinyl Estrad Other (See Comments)     Patient denies ever taking medication    Ciprofibrate Diarrhea    Ciprofloxacin Diarrhea, Other (See Comments) and Nausea And Vomiting    Penicillins Rash       Review of Systems: The Review of Systems is documented in full in the internal medical record. All systems are negative other than for those noted above. Past Medical History:  Documented in electronic medical record    Past Surgical History:  Documented in electronic medical record    Social History:  Documented in electronic medical record    Family History:  Documented in electronic medical record      Physical Examination:  General Appearance: Healthy appearing patient in no acute distress. Vital signs: /78 (Site: Right Upper Arm, Position: Standing)   Pulse 81   Temp 98 °F (36.7 °C)   Resp 12   Ht 5' 5\" (1.651 m)   Wt 134 lb 8 oz (61 kg)   SpO2 97%   BMI 22.38 kg/m²     Performance status: ECOG Level: 1  Distress  Screening Score: PHQ-9 Total Score: 1 (11/3/2022  2:30 PM)    Pain score: Pain Score:   5 (Fatigue- 9)  HEENT: No oral exam performed. Neck: Supple. There is no thyromegaly. Lymph nodes: There is definitely an approximately 3 cm lymph node in the low axilla on the right. Breasts: Not examined. Lungs: The lungs are clear to auscultation and percussion. There is no egophony. There is no chest wall tenderness and no  use of accessory respiratory musculature. Heart: There is no jugular venous distention. The rate is normal and rhythm regular. The S1 and S2 are normal and there are no murmurs or rubs. Abdomen: Soft, non-tender, bowel sounds present and normal, no appreciated hepatosplenomegaly. No palpable masses. Skin: No rash, petechiae or ecchymoses. No evidence of malignancy. Extremities: No cyanosis, clubbing or edema.         Labs/Imaging:  Lab Results   Component Value Date/Time    WBC 7.3 11/03/2022 02:07 PM    HGB 13.2 11/03/2022 02:07 PM    HCT 40.1 Gisselle Matt MD [facility]    Oncology and Hematology   [place]  [address] [address]  P [phone]  F [phone]  [email or web address]

## 2022-11-08 DIAGNOSIS — R22.31 AXILLARY MASS, RIGHT: Primary | ICD-10-CM

## 2022-11-21 RX ORDER — SODIUM CHLORIDE, SODIUM LACTATE, POTASSIUM CHLORIDE, CALCIUM CHLORIDE 600; 310; 30; 20 MG/100ML; MG/100ML; MG/100ML; MG/100ML
INJECTION, SOLUTION INTRAVENOUS CONTINUOUS
OUTPATIENT
Start: 2022-11-21

## 2022-11-21 RX ORDER — SODIUM CHLORIDE 9 MG/ML
INJECTION, SOLUTION INTRAVENOUS PRN
OUTPATIENT
Start: 2022-11-21

## 2022-11-21 RX ORDER — SODIUM CHLORIDE 0.9 % (FLUSH) 0.9 %
5-40 SYRINGE (ML) INJECTION PRN
OUTPATIENT
Start: 2022-11-21

## 2022-11-21 RX ORDER — PROCHLORPERAZINE EDISYLATE 5 MG/ML
5 INJECTION INTRAMUSCULAR; INTRAVENOUS
OUTPATIENT
Start: 2022-11-21 | End: 2022-11-22

## 2022-11-21 RX ORDER — HYDRALAZINE HYDROCHLORIDE 20 MG/ML
10 INJECTION INTRAMUSCULAR; INTRAVENOUS
OUTPATIENT
Start: 2022-11-21

## 2022-11-21 RX ORDER — HYDROMORPHONE HYDROCHLORIDE 2 MG/ML
0.5 INJECTION, SOLUTION INTRAMUSCULAR; INTRAVENOUS; SUBCUTANEOUS EVERY 5 MIN PRN
OUTPATIENT
Start: 2022-11-21

## 2022-11-21 RX ORDER — LIDOCAINE HYDROCHLORIDE 10 MG/ML
1 INJECTION, SOLUTION INFILTRATION; PERINEURAL
OUTPATIENT
Start: 2022-11-21 | End: 2022-11-22

## 2022-11-21 RX ORDER — OXYCODONE HYDROCHLORIDE 5 MG/1
5 TABLET ORAL
OUTPATIENT
Start: 2022-11-21 | End: 2022-11-22

## 2022-11-21 RX ORDER — LABETALOL HYDROCHLORIDE 5 MG/ML
10 INJECTION, SOLUTION INTRAVENOUS
OUTPATIENT
Start: 2022-11-21

## 2022-11-21 RX ORDER — SODIUM CHLORIDE 0.9 % (FLUSH) 0.9 %
5-40 SYRINGE (ML) INJECTION EVERY 12 HOURS SCHEDULED
OUTPATIENT
Start: 2022-11-21

## 2022-11-21 RX ORDER — HYDROMORPHONE HYDROCHLORIDE 2 MG/ML
0.25 INJECTION, SOLUTION INTRAMUSCULAR; INTRAVENOUS; SUBCUTANEOUS EVERY 5 MIN PRN
OUTPATIENT
Start: 2022-11-21

## 2022-11-21 RX ORDER — HALOPERIDOL 5 MG/ML
1 INJECTION INTRAMUSCULAR
OUTPATIENT
Start: 2022-11-21 | End: 2022-11-22

## 2022-11-21 RX ORDER — DIPHENHYDRAMINE HYDROCHLORIDE 50 MG/ML
12.5 INJECTION INTRAMUSCULAR; INTRAVENOUS
OUTPATIENT
Start: 2022-11-21 | End: 2022-11-22

## 2022-11-22 ENCOUNTER — HOSPITAL ENCOUNTER (OUTPATIENT)
Dept: ULTRASOUND IMAGING | Age: 82
Discharge: HOME | End: 2022-11-25
Payer: MEDICARE

## 2022-11-22 VITALS
RESPIRATION RATE: 16 BRPM | OXYGEN SATURATION: 97 % | TEMPERATURE: 98.1 F | HEART RATE: 75 BPM | SYSTOLIC BLOOD PRESSURE: 148 MMHG | BODY MASS INDEX: 22.63 KG/M2 | WEIGHT: 136 LBS | DIASTOLIC BLOOD PRESSURE: 67 MMHG

## 2022-11-22 DIAGNOSIS — R22.31 AXILLARY MASS, RIGHT: ICD-10-CM

## 2022-11-22 PROCEDURE — 2500000003 HC RX 250 WO HCPCS: Performed by: PHYSICIAN ASSISTANT

## 2022-11-22 PROCEDURE — 88305 TISSUE EXAM BY PATHOLOGIST: CPT

## 2022-11-22 PROCEDURE — 38505 NEEDLE BIOPSY LYMPH NODES: CPT

## 2022-11-22 RX ORDER — LIDOCAINE HYDROCHLORIDE 10 MG/ML
INJECTION, SOLUTION INFILTRATION; PERINEURAL
Status: COMPLETED | OUTPATIENT
Start: 2022-11-22 | End: 2022-11-22

## 2022-11-22 RX ADMIN — Medication 5 ML: at 12:13

## 2022-11-22 ASSESSMENT — PAIN - FUNCTIONAL ASSESSMENT: PAIN_FUNCTIONAL_ASSESSMENT: NONE - DENIES PAIN

## 2022-11-22 NOTE — DISCHARGE INSTRUCTIONS
If you have any questions about your procedure, please call the Interventional Radiology department at [phone]. After business hours (5pm) and weekends, call the answering service at [phone] and ask for the Radiologist on call to be paged. Si tiene Preguntas acerca del procedimiento, por favor llame al departamento de Radiología Intervencional al [phone]. Después de horas de oficina (5 pm) y los fines de [place], llamar al [place] al [phone] y pregunte por el Radiologo de [facility]. Interventional Radiology General Nurse Discharge    After general anesthesia or intravenous sedation, for 24 hours or while taking prescription Narcotics:  Limit your activities  Do not drive and operate hazardous machinery  Do not make important personal or business decisions  Do  not drink alcoholic beverages  If you have not urinated within 8 hours after discharge, please contact your surgeon on call. * Please give a list of your current medications to your Primary Care Provider. * Please update this list whenever your medications are discontinued, doses are     changed, or new medications (including over-the-counter products) are added. * Please carry medication information at all times in case of emergency situations. These are general instructions for a healthy lifestyle:    No smoking/ No tobacco products/ Avoid exposure to second hand smoke  Surgeon General's Warning:  Quitting smoking now greatly reduces serious risk to your health.     Obesity, smoking, and sedentary lifestyle greatly increases your risk for illness  A healthy diet, regular physical exercise & weight monitoring are important for maintaining a healthy lifestyle    You may be retaining fluid if you have a history of heart failure or if you experience any of the following symptoms:  Weight gain of 3 pounds or more overnight or 5 pounds in a week, increased swelling in our hands or feet or shortness of breath while lying flat in bed. Please call your doctor as soon as you notice any of these symptoms; do not wait until your next office visit. Recognize signs and symptoms of STROKE:  F-face looks uneven    A-arms unable to move or move unevenly    S-speech slurred or non-existent    T-time-call 911 as soon as signs and symptoms begin-DO NOT go       Back to bed or wait to see if you get better-TIME IS BRAIN.

## 2022-12-02 ENCOUNTER — OFFICE VISIT (OUTPATIENT)
Dept: ONCOLOGY | Age: 82
End: 2022-12-02
Payer: MEDICARE

## 2022-12-02 VITALS
RESPIRATION RATE: 20 BRPM | HEART RATE: 84 BPM | HEIGHT: 65 IN | SYSTOLIC BLOOD PRESSURE: 136 MMHG | TEMPERATURE: 98 F | OXYGEN SATURATION: 97 % | BODY MASS INDEX: 22.91 KG/M2 | DIASTOLIC BLOOD PRESSURE: 79 MMHG | WEIGHT: 137.5 LBS

## 2022-12-02 DIAGNOSIS — R47.89 WORD FINDING DIFFICULTY: ICD-10-CM

## 2022-12-02 DIAGNOSIS — C76.1 MALIGNANT NEOPLASM OF THORAX (HCC): ICD-10-CM

## 2022-12-02 DIAGNOSIS — C34.91 MALIGNANT NEOPLASM OF UNSPECIFIED PART OF RIGHT BRONCHUS OR LUNG (HCC): ICD-10-CM

## 2022-12-02 DIAGNOSIS — R22.31 AXILLARY MASS, RIGHT: Primary | ICD-10-CM

## 2022-12-02 PROCEDURE — G8400 PT W/DXA NO RESULTS DOC: HCPCS | Performed by: INTERNAL MEDICINE

## 2022-12-02 PROCEDURE — G8420 CALC BMI NORM PARAMETERS: HCPCS | Performed by: INTERNAL MEDICINE

## 2022-12-02 PROCEDURE — 1036F TOBACCO NON-USER: CPT | Performed by: INTERNAL MEDICINE

## 2022-12-02 PROCEDURE — 99214 OFFICE O/P EST MOD 30 MIN: CPT | Performed by: INTERNAL MEDICINE

## 2022-12-02 PROCEDURE — G8484 FLU IMMUNIZE NO ADMIN: HCPCS | Performed by: INTERNAL MEDICINE

## 2022-12-02 PROCEDURE — G8427 DOCREV CUR MEDS BY ELIG CLIN: HCPCS | Performed by: INTERNAL MEDICINE

## 2022-12-02 PROCEDURE — 1123F ACP DISCUSS/DSCN MKR DOCD: CPT | Performed by: INTERNAL MEDICINE

## 2022-12-02 PROCEDURE — 1090F PRES/ABSN URINE INCON ASSESS: CPT | Performed by: INTERNAL MEDICINE

## 2022-12-02 ASSESSMENT — PATIENT HEALTH QUESTIONNAIRE - PHQ9
SUM OF ALL RESPONSES TO PHQ QUESTIONS 1-9: 0
1. LITTLE INTEREST OR PLEASURE IN DOING THINGS: 0
SUM OF ALL RESPONSES TO PHQ9 QUESTIONS 1 & 2: 0
SUM OF ALL RESPONSES TO PHQ QUESTIONS 1-9: 0
2. FEELING DOWN, DEPRESSED OR HOPELESS: 0

## 2022-12-02 NOTE — PROGRESS NOTES
Navigation (tommee) aware of case and will reestablish - follow up next week  Tommee to coordinate Tempus testing from tissue (Nov 2022).

## 2022-12-02 NOTE — PROGRESS NOTES
HEMATOLOGY/ONCOLOGY FOLLOWUP  VISIT      Patient Name: Sam Avalos             Date of Visit: 2022  : 1940  Age:82 y.o. Presenting Complaint:  Sam Avalos  is seen in follow-up for lung cancer. History of Present Illness:   Ms. Bobby Carreon was seen for the first time in our office in 2020. She was a woman of generally good health prior to recent events. She had some chronic medical issues none of which intruded into  her day-to-day function. Her presenting illness happened in the context of the death of her  in 2020. She was his primary caregiver and had become somewhat depleted during that process attributing her symptoms to the stress involved with  his care. Beginning in mid October, she developed what she felt was an evolving bronchitis with cough and general malaise. Over the subsequent week she was treated with antibiotics and inhalers without benefit ultimately getting to the point where she  felt she could not take in a deep breath and presented to the emergency department on 2020. At that time her resting oxygen saturation was 87%. A pleural effusion on the right side was seen on chest x-ray and confirmed by CT scan with collapse  of the right lower lobe as well as a majority of the right middle lobe with mass-effect on the mediastinum. She underwent a thoracentesis removing initially 2800 cc of fluid and then on a subsequent day 1400 cc of fluid with significant improvement in  her sense of wellbeing. The fluid was described by the patient as bloody and subsequent pathology demonstrated the presence of an adenocarcinoma which stained positively for cytokeratin 7, Napsin, and TTF-1. It was negative for cytokeratin 20. CT scanning  of the neck abdomen and pelvis demonstrated no clear site of origin for this malignancy.   There was a suggestion of right-sided pleural nodularity although there remains some atelectatic change within the lung making a determination of primary lung lesion  on the right side difficult. She has not smoked since 1986 and had been an approximately 1 pack/day smoker prior to that. Her major complaint at the time of her initial presentation was that of extreme fatigue. A PET scan was performed with no definitively  obvious primary pulmonary malignancy. There is the right-sided pleural effusion as well as evidence of pleural nodularity which is somewhat FDG avid. There is no evidence of dissemination of disease elsewhere. A peripheral blood was sent for cell free  DNA analysis and disclosed a TP53 mutation but nothing else that was actionable. Began treatment with pembrolizumab paclitaxel and carboplatin in mid December 2020. Following 4 cycles of treatment, she underwent a CT scan. The study showed complete  disappearance of the right-sided pleural effusion. In addition, there was some pleural nodularity that seemed somewhat better on the scan. There were scattered pulmonary nodules which seemed stable as best one can compared to the prior study when there  was so much fluid present. She then began treatment with pembrolizumab alone. She received this medication for 6 months through September 2021. At that time, she began to have severe arthralgias which point the pembrolizumab was discontinued and she  was placed on prednisone with relief. She returns today for follow-up. As prior notes and test, we have been evaluating a right-sided axillary lesion for a few months. At her last visit, the sammy mass had once again shown some growth and she ultimately agreed to have having this lesion biopsied. 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 She otherwise feels completely well. She has no fever, night sweats or other constitutional symptoms. Her appetite has not been optimal and she has lost a few pounds.   She denies pain.      Current Outpatient Medications   Medication Sig Dispense Refill    Hyoscyamine Sulfate SL 0.125 MG SUBL Take 0.125 mg by mouth every 4 hours as needed (cramping or diarrhea) (Patient taking differently: Take 0.125 mg by mouth every 4 hours as needed (cramping or diarrhea) PRN) 270 each 1    Pramoxine-HC (HYDROCORTISONE-PRAMOXINE) 2.5-1 % CREA cream Place rectally 3 times daily (Patient taking differently: Place rectally 3 times daily PRN) 30 g 4    esomeprazole (NEXIUM) 20 MG delayed release capsule Take 20 mg by mouth      estradiol (ESTRACE) 0.1 MG/GM vaginal cream INSERT 1 GRAM TWICE A WEEK VAGINALLY AS DIRECTED      latanoprost (XALATAN) 0.005 % ophthalmic solution Apply 1 drop to eye      timolol (TIMOPTIC) 0.5 % ophthalmic solution PLACE 1 DROP IN BOTH EYES IN THE MORNING      triamcinolone (NASACORT) 55 MCG/ACT nasal inhaler 2 sprays daily      indomethacin (INDOCIN) 25 MG capsule Take 1 capsule by mouth 3 times daily (with meals) 90 capsule 0    acetaminophen (TYLENOL) 325 MG tablet Take 650 mg by mouth every 6 hours as needed (Patient not taking: No sig reported)      cetirizine (ZYRTEC) 10 MG tablet Take by mouth every evening (Patient not taking: No sig reported)       No current facility-administered medications for this visit. Facility-Administered Medications Ordered in Other Visits   Medication Dose Route Frequency Provider Last Rate Last Admin    lactated ringers infusion   IntraVENous Continuous PRN EDUARD Johnson - CRNA   New Bag at 11/22/22 1122       Allergies: Allergies   Allergen Reactions    Levonorgestrel-Ethinyl Estrad Other (See Comments)     Patient denies ever taking medication    Ciprofibrate Diarrhea    Ciprofloxacin Diarrhea, Other (See Comments) and Nausea And Vomiting    Penicillins Rash       Review of Systems: The Review of Systems is documented in full in the internal medical record. All systems are negative other than for those noted above.      Past Medical History:  Documented in electronic medical record    Past Surgical History:  Documented in electronic medical record    Social History:  Documented in electronic medical record    Family History:  Documented in electronic medical record      Physical Examination:  General Appearance: Healthy appearing patient in no acute distress. Vital signs: /79 Comment: standing  Pulse 84   Temp 98 °F (36.7 °C) (Oral)   Resp 20   Ht 5' 5\" (1.651 m)   Wt 137 lb 8 oz (62.4 kg)   SpO2 97%   BMI 22.88 kg/m²     Performance status: ECOG Level: 1  Distress  Screening Score: PHQ-9 Total Score: 0 (12/2/2022 11:04 AM)    Pain score: Pain Score:   5 (fatigue-7)  HEENT: No oral exam performed. Neck: Supple. There is no thyromegaly. Lymph nodes: There is definitely an approximately 3.5 cm lymph node in the low axilla on the right. Breasts: Not examined. Lungs: The lungs are clear to auscultation and percussion. There is no egophony. There is no chest wall tenderness and no  use of accessory respiratory musculature. Heart: There is no jugular venous distention. The rate is normal and rhythm regular. The S1 and S2 are normal and there are no murmurs or rubs. Abdomen: Soft, non-tender, bowel sounds present and normal, no appreciated hepatosplenomegaly. No palpable masses. Skin: No rash, petechiae or ecchymoses. No evidence of malignancy. Extremities: No cyanosis, clubbing or edema.         Labs/Imaging:  Lab Results   Component Value Date/Time    WBC 7.3 11/03/2022 02:07 PM    HGB 13.2 11/03/2022 02:07 PM    HCT 40.1 11/03/2022 02:07 PM     11/03/2022 02:07 PM    MCV 96.2 11/03/2022 02:07 PM       Lab Results   Component Value Date/Time     11/03/2022 02:07 PM    K 4.0 11/03/2022 02:07 PM     11/03/2022 02:07 PM    CO2 30 11/03/2022 02:07 PM    BUN 10 11/03/2022 02:07 PM    GFRAA >60 09/01/2022 01:50 PM    GLOB 2.5 11/03/2022 02:07 PM    ALT 21 11/03/2022 02:07 PM       Above results reviewed with patient. ASSESSMENT:  Ms. Mague Mackenzie initially presented with a metastatic process that immunohistochemically typed as being consistent with a primary pulmonary malignancy although no clear pulmonary lesion was ever identified. She was however treated successfully with pembrolizumab pemetrexed and carboplatin. She then went on to receive single agent maintenance pembrolizumab which she continued through September 2021. Ultimately, this resulted in rather significant arthralgias and was discontinued at that time. She now has evidence of a solitary 6 mm pulmonary nodule as well as a large right axillary mass which is biopsy positive for malignancy similar in description to her initial tumor. PLAN:  At this point, she agrees to undergo PET scanning and an MRI of the brain. Following that staging she will return to to see me to discuss next steps. In addition, although immunohistochemically this looks similar to her prior tumor, we will have it sent for additional molecular testing and PD-L1 analysis.         RESUSCITATION DIRECTIVES/HOSPICE CARE;  Full Support           [facility]    Oncology and Hematology   [place]  [address] [place] [address]  P [phone]  F [phone]  [email or web address]

## 2022-12-02 NOTE — PATIENT INSTRUCTIONS
Patient Instructions from Today's Visit    Reason for Visit:  Follow up visit lung       Plan: Your biopsy did show cancerous cells. We will send a test called Ilia off the new tissue sample from November 2022. Next steps:   -PET scan next week to see if there are other sites of disease we can see. -Tempus tumor testing     Follow Up: After PET scan       Recent Lab Results:      Treatment Summary has been discussed and given to patient: NA        -------------------------------------------------------------------------------------------------------------------  Please call our office at [phone] if you have any  of the following symptoms:   Fever of 100.5 or greater  Chills  Shortness of breath  Swelling or pain in one leg    After office hours an answering service is available and will contact a provider for emergencies or if you are experiencing any of the above symptoms. Patient has My Chart. My Chart log in information explained on the after visit summary printout at the [facility] desk.

## 2022-12-06 ENCOUNTER — HOSPITAL ENCOUNTER (OUTPATIENT)
Dept: PET IMAGING | Age: 82
Discharge: HOME | End: 2022-12-09
Payer: MEDICARE

## 2022-12-06 DIAGNOSIS — C34.91 MALIGNANT NEOPLASM OF UNSPECIFIED PART OF RIGHT BRONCHUS OR LUNG (HCC): ICD-10-CM

## 2022-12-06 DIAGNOSIS — R22.31 AXILLARY MASS, RIGHT: ICD-10-CM

## 2022-12-06 DIAGNOSIS — R47.89 WORD FINDING DIFFICULTY: ICD-10-CM

## 2022-12-06 DIAGNOSIS — C76.1 MALIGNANT NEOPLASM OF THORAX (HCC): ICD-10-CM

## 2022-12-06 LAB
GLUCOSE BLD STRIP.AUTO-MCNC: 104 MG/DL (ref 65–100)
SERVICE CMNT-IMP: ABNORMAL

## 2022-12-06 PROCEDURE — 3430000000 HC RX DIAGNOSTIC RADIOPHARMACEUTICAL: Performed by: INTERNAL MEDICINE

## 2022-12-06 PROCEDURE — 82962 GLUCOSE BLOOD TEST: CPT

## 2022-12-06 PROCEDURE — 2580000003 HC RX 258: Performed by: INTERNAL MEDICINE

## 2022-12-06 PROCEDURE — A9552 F18 FDG: HCPCS | Performed by: INTERNAL MEDICINE

## 2022-12-06 PROCEDURE — 78815 PET IMAGE W/CT SKULL-THIGH: CPT

## 2022-12-06 PROCEDURE — 6360000004 HC RX CONTRAST MEDICATION: Performed by: INTERNAL MEDICINE

## 2022-12-06 RX ORDER — SODIUM CHLORIDE 0.9 % (FLUSH) 0.9 %
10 SYRINGE (ML) INJECTION ONCE AS NEEDED
Status: COMPLETED | OUTPATIENT
Start: 2022-12-06 | End: 2022-12-06

## 2022-12-06 RX ORDER — FLUDEOXYGLUCOSE F 18 200 MCI/ML
12.45 INJECTION, SOLUTION INTRAVENOUS
Status: COMPLETED | OUTPATIENT
Start: 2022-12-06 | End: 2022-12-06

## 2022-12-06 RX ADMIN — DIATRIZOATE MEGLUMINE AND DIATRIZOATE SODIUM 10 ML: 660; 100 LIQUID ORAL; RECTAL at 07:55

## 2022-12-06 RX ADMIN — FLUDEOXYGLUCOSE F 18 12.45 MILLICURIE: 200 INJECTION, SOLUTION INTRAVENOUS at 07:55

## 2022-12-06 RX ADMIN — SODIUM CHLORIDE, PRESERVATIVE FREE 10 ML: 5 INJECTION INTRAVENOUS at 07:55

## 2022-12-08 ENCOUNTER — OFFICE VISIT (OUTPATIENT)
Dept: ONCOLOGY | Age: 82
End: 2022-12-08
Payer: MEDICARE

## 2022-12-08 VITALS
WEIGHT: 137.6 LBS | SYSTOLIC BLOOD PRESSURE: 129 MMHG | DIASTOLIC BLOOD PRESSURE: 79 MMHG | OXYGEN SATURATION: 94 % | HEIGHT: 65 IN | HEART RATE: 101 BPM | TEMPERATURE: 98.9 F | RESPIRATION RATE: 16 BRPM | BODY MASS INDEX: 22.92 KG/M2

## 2022-12-08 DIAGNOSIS — C34.81 MALIGNANT NEOPLASM OF OVERLAPPING SITES OF RIGHT LUNG (HCC): ICD-10-CM

## 2022-12-08 DIAGNOSIS — R68.89 FORGETFULNESS: ICD-10-CM

## 2022-12-08 DIAGNOSIS — R59.0 LOCALIZED ENLARGED LYMPH NODES: Primary | ICD-10-CM

## 2022-12-08 DIAGNOSIS — C77.3 MALIGNANT NEOPLASM METASTATIC TO LYMPH NODE OF AXILLA (HCC): ICD-10-CM

## 2022-12-08 PROCEDURE — 1090F PRES/ABSN URINE INCON ASSESS: CPT | Performed by: INTERNAL MEDICINE

## 2022-12-08 PROCEDURE — 99215 OFFICE O/P EST HI 40 MIN: CPT | Performed by: INTERNAL MEDICINE

## 2022-12-08 PROCEDURE — G8484 FLU IMMUNIZE NO ADMIN: HCPCS | Performed by: INTERNAL MEDICINE

## 2022-12-08 PROCEDURE — 1123F ACP DISCUSS/DSCN MKR DOCD: CPT | Performed by: INTERNAL MEDICINE

## 2022-12-08 PROCEDURE — G8400 PT W/DXA NO RESULTS DOC: HCPCS | Performed by: INTERNAL MEDICINE

## 2022-12-08 PROCEDURE — 1036F TOBACCO NON-USER: CPT | Performed by: INTERNAL MEDICINE

## 2022-12-08 PROCEDURE — G8427 DOCREV CUR MEDS BY ELIG CLIN: HCPCS | Performed by: INTERNAL MEDICINE

## 2022-12-08 PROCEDURE — G8420 CALC BMI NORM PARAMETERS: HCPCS | Performed by: INTERNAL MEDICINE

## 2022-12-08 NOTE — PATIENT INSTRUCTIONS
Patient Instructions from Today's Visit    Reason for Visit:  Follow up-Lung    Diagnosis Information:  [email or web address]-us/[email or web address]-answers-patient-education-[email or web address]-[email or web address]-hbyo-sheets    Plan:  The PET scan mentions a few areas of concern in your lymph nodes, but it seems to be very localized. We could refer you to Radiation Oncology for your treatment options. We will repeat a Brain MRI. Follow Up:  Office visit with Dr Jake Montgomery the end of the month. Recent Lab Results:  Hospital Outpatient Visit on 12/06/2022   Component Date Value Ref Range Status    POC Glucose 12/06/2022 104 (A)  65 - 100 mg/dL Final    Comment: 47 - 60 mg/dl Consistent with, but not fully diagnostic of hypoglycemia. 101 - 125 mg/dl Impaired fasting glucose/consistent with pre-diabetes mellitus  > 126 mg/dl Fasting glucose consistent with overt diabetes mellitus      Performed by: 12/06/2022 GilliamEmmaGraceNucMed/Pet   Final         Treatment Summary has been discussed and given to patient: n/a        -------------------------------------------------------------------------------------------------------------------  Please call our office at [phone] if you have any  of the following symptoms:   Fever of 100.5 or greater  Chills  Shortness of breath  Swelling or pain in one leg    After office hours an answering service is available and will contact a provider for emergencies or if you are experiencing any of the above symptoms. Patient does express an interest in My Chart. My Chart log in information explained on the after visit summary printout at the [facility] desk.     Gila Leonardo MA

## 2022-12-09 NOTE — PROGRESS NOTES
HEMATOLOGY/ONCOLOGY FOLLOWUP  VISIT      Patient Name: Joseph Banda             Date of Visit: 2022  : 1940  Age:82 y.o. Presenting Complaint:  Joseph Banda  is seen in follow-up for lung cancer. History of Present Illness:   Ms. Cesar López was seen for the first time in our office in 2020. She was a woman of generally good health prior to recent events. She had some chronic medical issues none of which intruded into  her day-to-day function. Her presenting illness happened in the context of the death of her  in 2020. She was his primary caregiver and had become somewhat depleted during that process attributing her symptoms to the stress involved with  his care. Beginning in mid October, she developed what she felt was an evolving bronchitis with cough and general malaise. Over the subsequent week she was treated with antibiotics and inhalers without benefit ultimately getting to the point where she  felt she could not take in a deep breath and presented to the emergency department on 2020. At that time her resting oxygen saturation was 87%. A pleural effusion on the right side was seen on chest x-ray and confirmed by CT scan with collapse  of the right lower lobe as well as a majority of the right middle lobe with mass-effect on the mediastinum. She underwent a thoracentesis removing initially 2800 cc of fluid and then on a subsequent day 1400 cc of fluid with significant improvement in  her sense of wellbeing. The fluid was described by the patient as bloody and subsequent pathology demonstrated the presence of an adenocarcinoma which stained positively for cytokeratin 7, Napsin, and TTF-1. It was negative for cytokeratin 20. CT scanning  of the neck abdomen and pelvis demonstrated no clear site of origin for this malignancy.   There was a suggestion of right-sided pleural nodularity although there remains some atelectatic change within the lung making a determination of primary lung lesion  on the right side difficult. She has not smoked since 1986 and had been an approximately 1 pack/day smoker prior to that. Her major complaint at the time of her initial presentation was that of extreme fatigue. A PET scan was performed with no definitively  obvious primary pulmonary malignancy. There is the right-sided pleural effusion as well as evidence of pleural nodularity which is somewhat FDG avid. There is no evidence of dissemination of disease elsewhere. A peripheral blood was sent for cell free  DNA analysis and disclosed a TP53 mutation but nothing else that was actionable. Began treatment with pembrolizumab paclitaxel and carboplatin in mid December 2020. Following 4 cycles of treatment, she underwent a CT scan. The study showed complete  disappearance of the right-sided pleural effusion. In addition, there was some pleural nodularity that seemed somewhat better on the scan. There were scattered pulmonary nodules which seemed stable as best one can compared to the prior study when there  was so much fluid present. She then began treatment with pembrolizumab alone. She received this medication for 6 months through September 2021. At that time, she began to have severe arthralgias which point the pembrolizumab was discontinued and she  was placed on prednisone with relief. She returns today for follow-up. At her last visit, we discussed the results of the biopsy of her right axillary lesion. This was consistent with an adenocarcinoma of lung origin and similar to pathology documented previously. To better assess the extent of her disease, we had her undergo a PET scan. This study shows no real dissemination of her malignancy. The epicenter of this process seems to be in the right axilla with some extension into the supraclavicular region on the right. There was no other visceral disease.   She has no focal neurologic complaints but just states that she feels foggy headed and wonders whether or not there may be cancer in her head as well. Current Outpatient Medications   Medication Sig Dispense Refill    Hyoscyamine Sulfate SL 0.125 MG SUBL Take 0.125 mg by mouth every 4 hours as needed (cramping or diarrhea) (Patient taking differently: Take 0.125 mg by mouth every 4 hours as needed (cramping or diarrhea) PRN) 270 each 1    Pramoxine-HC (HYDROCORTISONE-PRAMOXINE) 2.5-1 % CREA cream Place rectally 3 times daily (Patient taking differently: Place rectally 3 times daily PRN) 30 g 4    esomeprazole (NEXIUM) 20 MG delayed release capsule Take 20 mg by mouth      estradiol (ESTRACE) 0.1 MG/GM vaginal cream INSERT 1 GRAM TWICE A WEEK VAGINALLY AS DIRECTED      latanoprost (XALATAN) 0.005 % ophthalmic solution Apply 1 drop to eye      timolol (TIMOPTIC) 0.5 % ophthalmic solution PLACE 1 DROP IN BOTH EYES IN THE MORNING      triamcinolone (NASACORT) 55 MCG/ACT nasal inhaler 2 sprays daily      indomethacin (INDOCIN) 25 MG capsule Take 1 capsule by mouth 3 times daily (with meals) 90 capsule 0    acetaminophen (TYLENOL) 325 MG tablet Take 650 mg by mouth every 6 hours as needed (Patient not taking: No sig reported)      cetirizine (ZYRTEC) 10 MG tablet Take by mouth every evening (Patient not taking: No sig reported)       No current facility-administered medications for this visit. Facility-Administered Medications Ordered in Other Visits   Medication Dose Route Frequency Provider Last Rate Last Admin    diatrizoate meglumine-sodium (GASTROGRAFIN) 66-10 % solution 10 mL  10 mL Oral ONCE PRN Kushal Almeida MD   10 mL at 12/06/22 0755    lactated ringers infusion   IntraVENous Continuous PRN EDUARD Simon - CRNA   New Bag at 11/22/22 1122       Allergies:   Allergies   Allergen Reactions    Levonorgestrel-Ethinyl Estrad Other (See Comments)     Patient denies ever taking medication    Ciprofibrate Diarrhea Ciprofloxacin Diarrhea, Other (See Comments) and Nausea And Vomiting    Penicillins Rash       Review of Systems: The Review of Systems is documented in full in the internal medical record. All systems are negative other than for those noted above. Past Medical History:  Documented in electronic medical record    Past Surgical History:  Documented in electronic medical record    Social History:  Documented in electronic medical record    Family History:  Documented in electronic medical record      Physical Examination:  General Appearance: Healthy appearing patient in no acute distress. Vital signs: /79 Comment: standing  Pulse (!) 101   Temp 98.9 °F (37.2 °C) (Oral)   Resp 16   Ht 5' 5\" (1.651 m)   Wt 137 lb 9.6 oz (62.4 kg)   SpO2 94%   BMI 22.90 kg/m²     Performance status: ECOG Level: 1  Distress  Screening Score: PHQ-9 Total Score: 0 (12/8/2022  3:29 PM)    Pain score: Pain Score:   0 - No pain (fatigue-9)  HEENT: No oral exam performed. Neck: Supple. There is no thyromegaly. Lymph nodes: There is definitely an approximately 3.5 cm lymph node in the low axilla on the right. Breasts: Not examined. Lungs: The lungs are clear to auscultation and percussion. There is no egophony. There is no chest wall tenderness and no  use of accessory respiratory musculature. Heart: There is no jugular venous distention. The rate is normal and rhythm regular. The S1 and S2 are normal and there are no murmurs or rubs. Abdomen: Soft, non-tender, bowel sounds present and normal, no appreciated hepatosplenomegaly. No palpable masses. Skin: No rash, petechiae or ecchymoses. No evidence of malignancy. Extremities: No cyanosis, clubbing or edema.         Labs/Imaging:  Lab Results   Component Value Date/Time    WBC 7.3 11/03/2022 02:07 PM    HGB 13.2 11/03/2022 02:07 PM    HCT 40.1 11/03/2022 02:07 PM     11/03/2022 02:07 PM    MCV 96.2 11/03/2022 02:07 PM       Lab Results   Component Value Date/Time     11/03/2022 02:07 PM    K 4.0 11/03/2022 02:07 PM     11/03/2022 02:07 PM    CO2 30 11/03/2022 02:07 PM    BUN 10 11/03/2022 02:07 PM    GFRAA >60 09/01/2022 01:50 PM    GLOB 2.5 11/03/2022 02:07 PM    ALT 21 11/03/2022 02:07 PM       Above results reviewed with patient. ASSESSMENT:  Ms. Susi Knight initially presented with a metastatic process that immunohistochemically typed as being consistent with a primary pulmonary malignancy although no clear pulmonary lesion was ever identified. She was however treated successfully with pembrolizumab pemetrexed and carboplatin. She then went on to receive single agent maintenance pembrolizumab which she continued through September 2021. Ultimately, this resulted in rather significant arthralgias and was discontinued at that time. She now has evidence of a right axillary mass that has been biopsied and consistent with her prior malignancy. CT scanning had shown the presence of this axillary mass as well as a 6 mm pulmonary nodule. PET scanning confirms the metabolic activity of the axillary mass and shows the sammy extent to the up to the supraclavicular region on the right. The pulmonary nodule was not prominent in the PET scanning but it is small. We reviewed all of the images together. PLAN:  She will undergo an MRI of the brain. She is concerned about changes in her cognition. Results of that study may further influence her decision making regarding next steps. She is clearly averse to the notion of systemic therapy again. 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 She has some underlying autoimmune disorder and therefore is concerned about the possibility of immune therapy is impacting her sight. I did suggest a least a consultation with radiation oncology. Intervene if she chooses not to pursue systemic therapy.   I do have some concerns that this mass in the axilla can result in rather significant discomfort and lymphedema of that arm. She when I will have a conversation after being seen by radiation oncology.         RESUSCITATION DIRECTIVES/HOSPICE CARE;  Full Support           [facility]    Oncology and Hematology   [place]  [address] [place] [address]  P [phone]  F [phone]  [email or web address]

## 2022-12-13 ENCOUNTER — HOSPITAL ENCOUNTER (OUTPATIENT)
Dept: MRI IMAGING | Age: 82
Discharge: HOME | End: 2022-12-16

## 2022-12-13 DIAGNOSIS — R68.89 FORGETFULNESS: ICD-10-CM

## 2022-12-22 ENCOUNTER — HOSPITAL ENCOUNTER (OUTPATIENT)
Dept: RADIATION ONCOLOGY | Age: 82
Setting detail: RECURRING SERIES
Discharge: HOME | End: 2022-12-25
Payer: MEDICARE

## 2022-12-22 VITALS
TEMPERATURE: 97.7 F | BODY MASS INDEX: 22.8 KG/M2 | WEIGHT: 137 LBS | DIASTOLIC BLOOD PRESSURE: 82 MMHG | OXYGEN SATURATION: 96 % | SYSTOLIC BLOOD PRESSURE: 142 MMHG | HEART RATE: 83 BPM

## 2022-12-22 PROCEDURE — 99211 OFF/OP EST MAY X REQ PHY/QHP: CPT

## 2022-12-22 RX ORDER — OMEGA-3 FATTY ACIDS/FISH OIL 300-1000MG
1 CAPSULE ORAL PRN
COMMUNITY

## 2022-12-22 NOTE — PROGRESS NOTES
Consult right axillary lesion. Pt alone for consult. No previous RT history. Radiation teaching completed. CONSENTS SIGNED FOR RT.  CT SIM SCHEDULED. APT GIVEN TO PT.     Jan Johnson.  [address]

## 2022-12-22 NOTE — CONSULTS
RADIATION ONCOLOGY INITIAL VISIT  12/22/22    Referring Provider: Jane Blackmon MD    Chief Complaint: Metastatic lung adenocarcinoma    History of Present Illness:  Diagnosis/Treatment   81 y.o. female who presented with a large right pleural effusion in October 2020. Thoracentesis was positive for adenocarcinoma. PET showed right pleural nodularity but no obvious primary was identified. No evidence of disease elsewhere. She received pembrolizumab paclitaxel and carboplatin x4 cycles with complete resolution of right pleural effusion and improvement in pleural nodularity on subsequent imaging. Then received pembrolizumab alone x6 months, discontinued Sept 2021 due to side effects. Surveillance scans showed right axillary lymphadenopathy which subsequently grew, as well as an enlarging 6 mm left lung nodule. Biopsy of a right axillary LN 11/22/22 showed metastatic carcinoma, consistent with adenocarcinoma of lung origin. PET/CT 12/6/22: Multiple hypermetabolic right axillary lymph nodes, largest 3.3 cm, as well as a right posterior supraclavicular LN and right neck base LN. No other definite evidence of disease. MRI brain 12/13/22: No evidence of intracranial metastases. She does not want to pursue additional systemic therapy and was sent for consideration of RT to the right axilla. Current symptoms/ROS:   She reports a palpable mass in the right axilla, tender to palpation but not painful otherwise. Mobility is full in the RUE. Pregnant: no  Smoker: former   Prior radiation: no  Implanted cardiac devices: no  History of lupus or scleroderma: no  Family history of cancer: yes - mother had stomach cancer  Genetic testing: no  Meds: Reviewed. Pertinent - none.   Can continue all current meds during RT    Physical Examination:  Vitals:    12/22/22 0839   BP: (!) 142/82   Pulse: 83   Temp: 97.7 °F (36.5 °C)   TempSrc: Oral   SpO2: 96%   Weight: 137 lb (62.1 kg)     Pain 0/10  ECOG 1 - Strenuous physical activity restricted; fully ambulatory and able to carry out light work. Well nourished, in NAD. Alert and oriented x3. Lungs CTAB. RRR. Palpable right axillary lymphadenopathy, fixed. No palpable lymph nodes in the neck/SCV region. Assessment:  Metastatic lung adenocarcinoma with right axillary/neck lymph node metastases         We reviewed the diagnosis and recent imaging findings. She has fairly extensive right axillary lymphadenopathy, as well as two small low right neck lymph nodes, and no evidence of disease elsewhere on the PET. There was also a 6 mm left lung nodule on CT, too small to characterize by PET. She has declined additional systemic therapy and is here to review the possible role of radiation therapy as a treatment option. She may consider targeted therapy, but testing for that is pending. She has stage IV disease, but all of her disease is confined to the same region on the PET. If we are going to treat the right axilla for local control, it would make sense to encompass all of the active disease in that area, although she is at a high risk for failure elsewhere without additional systemic therapy. We reviewed a short court of RT vs a more protracted/aggressive palliative regimen that would potentially result in more durable disease control, albeit at the expense of more expected side effects. We discussed the logistics, benefits, risks, and alternatives to external beam radiation therapy. Prior to the initiation of treatment, a CT simulation is performed for planning purposes. Potential side effects of EBRT to the right axilla/low neck include but are not limited to fatigue, dermatitis, pain, pneumonitis, lymphedema (esophagitis risk is low due to lateral location of low neck lymph nodes). Treatment would be daily Monday-Friday for 2-3 weeks. Patient will be assessed weekly while under treatment for toxicity management.   The goal of treatment is palliative to improve disease control. Patient had the opportunity to ask questions, all were answered to her satisfaction. She understood the discussion and wanted to proceed with radiation treatment. Informed consent was obtained. She may reconsider pending discussion with Dr. Aaron Dawson on 12/30 to discuss any targeted therapy options, but wanted to move forward with scheduling of CT sim. She will cancel it if she opts not to proceed with RT. Plan:  -Follow up with Dr. Aaron Dawson 12/30/22  -CT simulation tentatively 1/3/23  -RT plan: 45 Gy in 15 fx IMRT/VMAT right axilla/low right neck LNs (will see how she tolerates and stop at 30/10 if side effects are too severe)   -Informed pt of my upcoming departure from this clinic and that her care will be transferred to another physician upon my departure.   -The patient has a documented plan of care to address pain. Pain is not present.       Ruchi Goins MD  12/22/22     I spent a total of 65 minutes today performing the following care related activities for Carson Esposito:  Face to face exam/evaluation, /Educate Patient/Caregivers, Pre-Charting/Documenting after the visit, and Interpreting/Ordering Meds, Tests, Procedures

## 2022-12-30 ENCOUNTER — HOSPITAL ENCOUNTER (OUTPATIENT)
Dept: LAB | Age: 82
Discharge: HOME | End: 2022-12-30
Payer: MEDICARE

## 2022-12-30 ENCOUNTER — OFFICE VISIT (OUTPATIENT)
Dept: ONCOLOGY | Age: 82
End: 2022-12-30
Payer: MEDICARE

## 2022-12-30 VITALS
SYSTOLIC BLOOD PRESSURE: 144 MMHG | BODY MASS INDEX: 22.63 KG/M2 | TEMPERATURE: 97.7 F | RESPIRATION RATE: 16 BRPM | DIASTOLIC BLOOD PRESSURE: 72 MMHG | WEIGHT: 136 LBS | OXYGEN SATURATION: 98 % | HEART RATE: 80 BPM

## 2022-12-30 DIAGNOSIS — C77.3 MALIGNANT NEOPLASM METASTATIC TO LYMPH NODE OF AXILLA (HCC): ICD-10-CM

## 2022-12-30 DIAGNOSIS — C34.91 MALIGNANT NEOPLASM OF RIGHT LUNG, UNSPECIFIED PART OF LUNG (HCC): Primary | ICD-10-CM

## 2022-12-30 DIAGNOSIS — C34.91 MALIGNANT NEOPLASM OF RIGHT LUNG, UNSPECIFIED PART OF LUNG (HCC): ICD-10-CM

## 2022-12-30 LAB
ALBUMIN SERPL-MCNC: 3.9 G/DL (ref 3.2–4.6)
ALBUMIN/GLOB SERPL: 1.3 {RATIO} (ref 0.4–1.6)
ALP SERPL-CCNC: 84 U/L (ref 50–136)
ALT SERPL-CCNC: 25 U/L (ref 12–65)
ANION GAP SERPL CALC-SCNC: 5 MMOL/L (ref 2–11)
AST SERPL-CCNC: 20 U/L (ref 15–37)
BASOPHILS # BLD: 0 K/UL (ref 0–0.2)
BASOPHILS NFR BLD: 1 % (ref 0–2)
BILIRUB SERPL-MCNC: 0.5 MG/DL (ref 0.2–1.1)
BUN SERPL-MCNC: 10 MG/DL (ref 8–23)
CALCIUM SERPL-MCNC: 10 MG/DL (ref 8.3–10.4)
CHLORIDE SERPL-SCNC: 103 MMOL/L (ref 101–110)
CO2 SERPL-SCNC: 31 MMOL/L (ref 21–32)
CREAT SERPL-MCNC: 0.7 MG/DL (ref 0.6–1)
DIFFERENTIAL METHOD BLD: NORMAL
EOSINOPHIL # BLD: 0.3 K/UL (ref 0–0.8)
EOSINOPHIL NFR BLD: 4 % (ref 0.5–7.8)
ERYTHROCYTE [DISTWIDTH] IN BLOOD BY AUTOMATED COUNT: 13.7 % (ref 11.9–14.6)
GLOBULIN SER CALC-MCNC: 3 G/DL (ref 2.8–4.5)
GLUCOSE SERPL-MCNC: 92 MG/DL (ref 65–100)
HCT VFR BLD AUTO: 39.5 % (ref 35.8–46.3)
HGB BLD-MCNC: 13.2 G/DL (ref 11.7–15.4)
IMM GRANULOCYTES # BLD AUTO: 0 K/UL (ref 0–0.5)
IMM GRANULOCYTES NFR BLD AUTO: 1 % (ref 0–5)
LYMPHOCYTES # BLD: 1.6 K/UL (ref 0.5–4.6)
LYMPHOCYTES NFR BLD: 25 % (ref 13–44)
MCH RBC QN AUTO: 31.6 PG (ref 26.1–32.9)
MCHC RBC AUTO-ENTMCNC: 33.4 G/DL (ref 31.4–35)
MCV RBC AUTO: 94.5 FL (ref 82–102)
MONOCYTES # BLD: 0.6 K/UL (ref 0.1–1.3)
MONOCYTES NFR BLD: 9 % (ref 4–12)
NEUTS SEG # BLD: 4 K/UL (ref 1.7–8.2)
NEUTS SEG NFR BLD: 62 % (ref 43–78)
NRBC # BLD: 0 K/UL (ref 0–0.2)
PLATELET # BLD AUTO: 175 K/UL (ref 150–450)
PMV BLD AUTO: 10 FL (ref 9.4–12.3)
POTASSIUM SERPL-SCNC: 4.2 MMOL/L (ref 3.5–5.1)
PROT SERPL-MCNC: 6.9 G/DL (ref 6.3–8.2)
RBC # BLD AUTO: 4.18 M/UL (ref 4.05–5.2)
SODIUM SERPL-SCNC: 139 MMOL/L (ref 133–143)
WBC # BLD AUTO: 6.5 K/UL (ref 4.3–11.1)

## 2022-12-30 PROCEDURE — 1123F ACP DISCUSS/DSCN MKR DOCD: CPT | Performed by: INTERNAL MEDICINE

## 2022-12-30 PROCEDURE — 1090F PRES/ABSN URINE INCON ASSESS: CPT | Performed by: INTERNAL MEDICINE

## 2022-12-30 PROCEDURE — 99214 OFFICE O/P EST MOD 30 MIN: CPT | Performed by: INTERNAL MEDICINE

## 2022-12-30 PROCEDURE — 85025 COMPLETE CBC W/AUTO DIFF WBC: CPT

## 2022-12-30 PROCEDURE — G8427 DOCREV CUR MEDS BY ELIG CLIN: HCPCS | Performed by: INTERNAL MEDICINE

## 2022-12-30 PROCEDURE — 1036F TOBACCO NON-USER: CPT | Performed by: INTERNAL MEDICINE

## 2022-12-30 PROCEDURE — 36415 COLL VENOUS BLD VENIPUNCTURE: CPT

## 2022-12-30 PROCEDURE — 80053 COMPREHEN METABOLIC PANEL: CPT

## 2022-12-30 PROCEDURE — G8484 FLU IMMUNIZE NO ADMIN: HCPCS | Performed by: INTERNAL MEDICINE

## 2022-12-30 PROCEDURE — G8400 PT W/DXA NO RESULTS DOC: HCPCS | Performed by: INTERNAL MEDICINE

## 2022-12-30 PROCEDURE — G8420 CALC BMI NORM PARAMETERS: HCPCS | Performed by: INTERNAL MEDICINE

## 2022-12-30 NOTE — PATIENT INSTRUCTIONS
Patient Instructions from Today's Visit    Reason for Visit:  Follow up    Diagnosis Information:  https://www.[employer]/. net/about-us/asco-answers-patient-education-materials/rxpc-wlvdkcn-fgao-sheets  Patient was educated and given handouts published by [employer] entitled [employer] Answers Fact Sheets about their diagnosis of  during todays office visit. Plan:      Follow Up:   Follow up in 2 months with Dr. Dean Swartz Results:  Hospital Outpatient Visit on 2022   Component Date Value Ref Range Status    WBC 2022 6.5  4.3 - 11.1 K/uL Final    RBC 2022 4.18  4.05 - 5.2 M/uL Final    Hemoglobin 2022 13.2  11.7 - 15.4 g/dL Final    Hematocrit 2022 39.5  35.8 - 46.3 % Final    MCV 2022 94.5  82.0 - 102.0 FL Final    MCH 2022 31.6  26.1 - 32.9 PG Final    MCHC 2022 33.4  31.4 - 35.0 g/dL Final    RDW 2022 13.7  11.9 - 14.6 % Final    Platelets  175  150 - 450 K/uL Final    MPV 2022 10.0  9.4 - 12.3 FL Final    nRBC 2022 0.00  0.0 - 0.2 K/uL Final    **Note: Absolute NRBC parameter is now reported with Hemogram**    Differential Type 2022 AUTOMATED    Final    Seg Neutrophils 2022 62  43 - 78 % Final    Lymphocytes 2022 25  13 - 44 % Final    Monocytes 2022 9  4.0 - 12.0 % Final    Eosinophils % 2022 4  0.5 - 7.8 % Final    Basophils 2022 1  0.0 - 2.0 % Final    Immature Granulocytes 2022 1  0.0 - 5.0 % Final    Segs Absolute 2022 4.0  1.7 - 8.2 K/UL Final    Absolute Lymph # 2022 1.6  0.5 - 4.6 K/UL Final    Absolute Mono # 2022 0.6  0.1 - 1.3 K/UL Final    Absolute Eos # 2022 0.3  0.0 - 0.8 K/UL Final    Basophils Absolute 2022 0.0  0.0 - 0.2 K/UL Final    Absolute Immature Granulocyte 2022 0.0  0.0 - 0.5 K/UL Final    Sodium 2022 139  133 - 143 mmol/L Final    Potassium 2022 4.2  3.5 - 5.1 mmol/L Final    Chloride 2022 103  101 - 110 mmol/L Final CO2 12/30/2022 31  21 - 32 mmol/L Final    Anion Gap 12/30/2022 5  2 - 11 mmol/L Final    Glucose 12/30/2022 92  65 - 100 mg/dL Final    BUN 12/30/2022 10  8 - 23 MG/DL Final    Creatinine 12/30/2022 0.70  0.6 - 1.0 MG/DL Final    Est, Glom Filt Rate 12/30/2022 >60  >60 ml/min/1.73m2 Final    Comment:      Pediatric calculator link: [employer].at. org/professionals/kdoqi/gfr_calculatorped       These results are not intended for use in patients <25years of age. eGFR results are calculated without a race factor using  the 2021 CKD-EPI equation. Careful clinical correlation is recommended, particularly when comparing to results calculated using previous equations. The CKD-EPI equation is less accurate in patients with extremes of muscle mass, extra-renal metabolism of creatinine, excessive creatine ingestion, or following therapy that affects renal tubular secretion. Calcium 12/30/2022 10.0  8.3 - 10.4 MG/DL Final    Total Bilirubin 12/30/2022 0.5  0.2 - 1.1 MG/DL Final    ALT 12/30/2022 25  12 - 65 U/L Final    AST 12/30/2022 20  15 - 37 U/L Final    Alk Phosphatase 12/30/2022 84  50 - 136 U/L Final    Total Protein 12/30/2022 6.9  6.3 - 8.2 g/dL Final    Albumin 12/30/2022 3.9  3.2 - 4.6 g/dL Final    Globulin 12/30/2022 3.0  2.8 - 4.5 g/dL Final    Albumin/Globulin Ratio 12/30/2022 1.3  0.4 - 1.6   Final        Treatment Summary has been discussed and given to patient: n/a        -------------------------------------------------------------------------------------------------------------------  Please call our office at [phone] if you have any  of the following symptoms:   Fever of 100.5 or greater  Chills  Shortness of breath  Swelling or pain in one leg    After office hours an answering service is available and will contact a provider for emergencies or if you are experiencing any of the above symptoms. Patient did express an interest in My Chart.   My Chart log in information explained on the after visit summary printout at the [facility] Shana Coleman [address] desk.     Carito Vázquez RN

## 2022-12-30 NOTE — PROGRESS NOTES
HEMATOLOGY/ONCOLOGY FOLLOWUP  VISIT      Patient Name: Angelique Lorenzo             Date of Visit: 2022  : 1940  Age:82 y.o. Presenting Complaint:  Angelique Lorenzo  is seen in follow-up for lung cancer. History of Present Illness:   Ms. Pinkie Barthel was seen for the first time in our office in 2020. She was a woman of generally good health prior to recent events. She had some chronic medical issues none of which intruded into  her day-to-day function. Her presenting illness happened in the context of the death of her  in 2020. She was his primary caregiver and had become somewhat depleted during that process attributing her symptoms to the stress involved with  his care. Beginning in mid October, she developed what she felt was an evolving bronchitis with cough and general malaise. Over the subsequent week she was treated with antibiotics and inhalers without benefit ultimately getting to the point where she  felt she could not take in a deep breath and presented to the emergency department on 2020. At that time her resting oxygen saturation was 87%. A pleural effusion on the right side was seen on chest x-ray and confirmed by CT scan with collapse  of the right lower lobe as well as a majority of the right middle lobe with mass-effect on the mediastinum. She underwent a thoracentesis removing initially 2800 cc of fluid and then on a subsequent day 1400 cc of fluid with significant improvement in  her sense of wellbeing. The fluid was described by the patient as bloody and subsequent pathology demonstrated the presence of an adenocarcinoma which stained positively for cytokeratin 7, Napsin, and TTF-1. It was negative for cytokeratin 20. CT scanning  of the neck abdomen and pelvis demonstrated no clear site of origin for this malignancy.   There was a suggestion of right-sided pleural nodularity although there remains some atelectatic change within the lung making a determination of primary lung lesion  on the right side difficult. She has not smoked since 1986 and had been an approximately 1 pack/day smoker prior to that. Her major complaint at the time of her initial presentation was that of extreme fatigue. A PET scan was performed with no definitively  obvious primary pulmonary malignancy. There is the right-sided pleural effusion as well as evidence of pleural nodularity which is somewhat FDG avid. There is no evidence of dissemination of disease elsewhere. A peripheral blood was sent for cell free  DNA analysis and disclosed a TP53 mutation but nothing else that was actionable. Began treatment with pembrolizumab paclitaxel and carboplatin in mid December 2020. Following 4 cycles of treatment, she underwent a CT scan. The study showed complete  disappearance of the right-sided pleural effusion. In addition, there was some pleural nodularity that seemed somewhat better on the scan. There were scattered pulmonary nodules which seemed stable as best one can compared to the prior study when there  was so much fluid present. She then began treatment with pembrolizumab alone. She received this medication for 6 months through September 2021. At that time, she began to have severe arthralgias which point the pembrolizumab was discontinued and she  was placed on prednisone with relief. In the fall 2022, she developed evidence of a right axillary swelling. Biopsy was performed and consistent with an adenocarcinoma of lung origin similar to the previous pathology. She then underwent a PET scan which showed no significant dissemination of her malignancy however there was evidence of FDG avidity in multiple enlarged nodes in the right axilla. There was no evidence of visceral disease. MRI of the brain showed no evidence of malignancy. She returns today for follow-up.   As noted above, recent MRI was negative although she continues to feel \"foggy headed\". In addition, she was seen by Dr. Genevieve Sanders in radiation oncology. There is a plan for mapping of the right axilla with palliative radiation to follow totaling 4500 cGy over 15 fractions. Ms. Bossman Rivera also wanted to review the recent [employer] analysis that was performed. The study again shows a high PD-L1 score of 80%. There were no significant actionable mutations with the exception of [address] for which 1 might consider a PARP inhibitor although in lung cancer this would clearly be a non-FDA approved use of the medication. Current Outpatient Medications   Medication Sig Dispense Refill    ibuprofen (ADVIL;MOTRIN) 200 MG CAPS Take 1 capsule by mouth as needed for Fever      esomeprazole (NEXIUM) 20 MG delayed release capsule Take 20 mg by mouth every morning (before breakfast)      estradiol (ESTRACE) 0.1 MG/GM vaginal cream INSERT 1 GRAM TWICE A WEEK VAGINALLY AS DIRECTED      latanoprost (XALATAN) 0.005 % ophthalmic solution Place 1 drop into both eyes nightly      timolol (TIMOPTIC) 0.5 % ophthalmic solution PLACE 1 DROP IN BOTH EYES IN THE MORNING      triamcinolone (NASACORT) 55 MCG/ACT nasal inhaler 2 sprays by Nasal route daily as needed      Hyoscyamine Sulfate SL 0.125 MG SUBL Take 0.125 mg by mouth every 4 hours as needed (cramping or diarrhea) (Patient taking differently: Take 0.125 mg by mouth every 4 hours as needed (cramping or diarrhea) PRN) 270 each 1    Pramoxine-HC (HYDROCORTISONE-PRAMOXINE) 2.5-1 % CREA cream Place rectally 3 times daily (Patient taking differently: Place rectally 3 times daily PRN) 30 g 4    cetirizine (ZYRTEC) 10 MG tablet Take by mouth every evening (Patient not taking: No sig reported)       No current facility-administered medications for this visit.      Facility-Administered Medications Ordered in Other Visits   Medication Dose Route Frequency Provider Last Rate Last Admin    lactated ringers infusion   IntraVENous Continuous PRN Quiana Brush Zuleima Lemon, [facility] at 11/22/22 1122       Allergies: Allergies   Allergen Reactions    Levonorgestrel-Ethinyl Estrad Other (See Comments)     Patient denies ever taking medication    Ciprofibrate Diarrhea    Ciprofloxacin Diarrhea, Other (See Comments) and Nausea And Vomiting    Penicillins Rash       Review of Systems: The Review of Systems is documented in full in the internal medical record. All systems are negative other than for those noted above. Past Medical History:  Documented in electronic medical record    Past Surgical History:  Documented in electronic medical record    Social History:  Documented in electronic medical record    Family History:  Documented in electronic medical record      Physical Examination:  General Appearance: Healthy appearing patient in no acute distress. Vital signs: BP (!) 144/72   Pulse 80   Temp 97.7 °F (36.5 °C)   Resp 16   Wt 136 lb (61.7 kg)   SpO2 98%   BMI 22.63 kg/m²     Performance status: ECOG Level: 1  Distress  Screening Score: No data recorded    Pain score: Pain Score:  10 - Worst pain ever (fatigue)  HEENT: No oral exam performed. Neck: Supple. There is no thyromegaly. Lymph nodes: There is definitely an approximately 3.5 cm lymph node in the low axilla on the right. Breasts: Not examined. Lungs: The lungs are clear to auscultation and percussion. There is no egophony. There is no chest wall tenderness and no  use of accessory respiratory musculature. Heart: There is no jugular venous distention. The rate is normal and rhythm regular. The S1 and S2 are normal and there are no murmurs or rubs. Abdomen: Soft, non-tender, bowel sounds present and normal, no appreciated hepatosplenomegaly. No palpable masses. Skin: No rash, petechiae or ecchymoses. No evidence of malignancy. Extremities: No cyanosis, clubbing or edema.         Labs/Imaging:  Lab Results   Component Value Date/Time    WBC 6.5 12/30/2022 01:32 PM    HGB 13.2 12/30/2022 01:32 PM    HCT 39.5 12/30/2022 01:32 PM     12/30/2022 01:32 PM    MCV 94.5 12/30/2022 01:32 PM       Lab Results   Component Value Date/Time     12/30/2022 01:32 PM    K 4.2 12/30/2022 01:32 PM     12/30/2022 01:32 PM    CO2 31 12/30/2022 01:32 PM    BUN 10 12/30/2022 01:32 PM    GFRAA >60 09/01/2022 01:50 PM    GLOB 3.0 12/30/2022 01:32 PM    ALT 25 12/30/2022 01:32 PM       Above results reviewed with patient. ASSESSMENT:  Ms. Stephanie Washington initially presented with a metastatic process that immunohistochemically typed as being consistent with a primary pulmonary malignancy although no clear pulmonary lesion was ever identified. She was however treated successfully with pembrolizumab pemetrexed and carboplatin. She then went on to receive single agent maintenance pembrolizumab which she continued through September 2021. Ultimately, this resulted in rather significant arthralgias and uveitis resulting in it being discontinued at that time. She now has evidence of a right axillary mass that has been biopsied and consistent with her prior malignancy. CT scanning had shown the presence of this axillary mass as well as a 6 mm pulmonary nodule. PET scanning confirms the metabolic activity of the axillary mass and shows the sammy extent to the up to the supraclavicular region on the right. The pulmonary nodule was not prominent in the PET scanning but it is small. She is uninterested in additional systemic therapy but is willing to consider palliative radiation therapy. Mapping will take place next week. Given the [facility] [place] ETOWAH 1 mutation, there is at least a theoretical rationale for the use of PARP inhibitors. PLAN:  She will undergo the palliative radiation therapy and then follow-up here. She will be set up with Dr. Robyn Alegria in 2 months. As noted, she is not interested in systemic therapy and had toxicity with immunotherapy, specifically pembrolizumab.   I defer to Dr. Robyn Alegria and the patient as to whether or not she should get scheduled scans or just diagnostic studies associated with new symptoms. At some point she may want to retry immunotherapy with a different agent with the possibility that there may not be cross-reactive side effects. PARP inhibitors like olaparib could be considered given the above but would certainly be non-FDA approved.         RESUSCITATION DIRECTIVES/HOSPICE CARE;  Full Support           [employer]    Oncology and Hematology   [place]  [address] [place] [address]  P [phone]  F [phone]  [email or web address]

## 2023-01-04 ENCOUNTER — HOSPITAL ENCOUNTER (OUTPATIENT)
Dept: RADIATION ONCOLOGY | Age: 83
Setting detail: RECURRING SERIES
Discharge: HOME | End: 2023-01-07

## 2023-01-10 ENCOUNTER — HOSPITAL ENCOUNTER (OUTPATIENT)
Dept: RADIATION ONCOLOGY | Age: 83
Setting detail: RECURRING SERIES
Discharge: HOME | End: 2023-01-13
Payer: MEDICARE

## 2023-01-10 PROCEDURE — 77338 DESIGN MLC DEVICE FOR IMRT: CPT

## 2023-01-12 ENCOUNTER — HOSPITAL ENCOUNTER (OUTPATIENT)
Dept: RADIATION ONCOLOGY | Age: 83
Setting detail: RECURRING SERIES
Discharge: HOME | End: 2023-01-15
Payer: MEDICARE

## 2023-01-12 PROCEDURE — 77386 HC NTSTY MODUL RAD TX DLVR CPLX: CPT

## 2023-01-13 ENCOUNTER — HOSPITAL ENCOUNTER (OUTPATIENT)
Dept: RADIATION ONCOLOGY | Age: 83
Setting detail: RECURRING SERIES
Discharge: HOME | End: 2023-01-16
Payer: MEDICARE

## 2023-01-13 PROCEDURE — 77386 HC NTSTY MODUL RAD TX DLVR CPLX: CPT

## 2023-01-14 DIAGNOSIS — C34.81 MALIGNANT NEOPLASM OF OVERLAPPING SITES OF RIGHT LUNG (HCC): Primary | ICD-10-CM

## 2023-01-14 NOTE — PROGRESS NOTES
RADIATION ONCOLOGY ON-TREATMENT VISIT  1/13/2023     Diagnosis:  Metastatic lung adenocarcinoma with right axillary/neck lymph node metastases     This is a 81 y.o. female who is currently receiving palliative RT to right axilla/low neck. Current RT dose: 6/45 Gy in 2/15 fractions. No concurrent systemic therapy    Subjective: Today, the patient reports doing well with RT thus far. No skin changes. Objective: There were no vitals filed for this visit. Pain 0/10    General: Alert and conversant, in NAD    Assessment:  Patient is tolerating radiation therapy well. Plan:  -Continue RT as planned. -Treatment images reviewed. -Pt will stop treatment at 10 fx (30 Gy) if side effects are too much.  -Informed pt of my upcoming departure from this clinic and that her care will be transferred to another physician upon my departure.   -The patient has a documented plan of care to address pain. Pain is not present.       Manjit Dillon MD  1/13/2023

## 2023-01-16 ENCOUNTER — HOSPITAL ENCOUNTER (OUTPATIENT)
Dept: RADIATION ONCOLOGY | Age: 83
Setting detail: RECURRING SERIES
Discharge: HOME | End: 2023-01-19
Payer: MEDICARE

## 2023-01-16 PROCEDURE — 77386 HC NTSTY MODUL RAD TX DLVR CPLX: CPT

## 2023-01-17 ENCOUNTER — HOSPITAL ENCOUNTER (OUTPATIENT)
Dept: RADIATION ONCOLOGY | Age: 83
Setting detail: RECURRING SERIES
Discharge: HOME | End: 2023-01-20
Payer: MEDICARE

## 2023-01-17 PROCEDURE — 77386 HC NTSTY MODUL RAD TX DLVR CPLX: CPT

## 2023-01-18 ENCOUNTER — HOSPITAL ENCOUNTER (OUTPATIENT)
Dept: RADIATION ONCOLOGY | Age: 83
Setting detail: RECURRING SERIES
Discharge: HOME | End: 2023-01-21
Payer: MEDICARE

## 2023-01-18 PROCEDURE — 77386 HC NTSTY MODUL RAD TX DLVR CPLX: CPT

## 2023-01-19 ENCOUNTER — HOSPITAL ENCOUNTER (OUTPATIENT)
Dept: RADIATION ONCOLOGY | Age: 83
Setting detail: RECURRING SERIES
End: 2023-01-19
Payer: MEDICARE

## 2023-01-19 PROCEDURE — 77336 RADIATION PHYSICS CONSULT: CPT

## 2023-01-19 PROCEDURE — 77386 HC NTSTY MODUL RAD TX DLVR CPLX: CPT

## 2023-01-20 ENCOUNTER — HOSPITAL ENCOUNTER (OUTPATIENT)
Dept: RADIATION ONCOLOGY | Age: 83
Setting detail: RECURRING SERIES
End: 2023-01-20
Payer: MEDICARE

## 2023-01-20 PROCEDURE — 77386 HC NTSTY MODUL RAD TX DLVR CPLX: CPT

## 2023-01-20 NOTE — ONCOLOGY
RADIATION ONCOLOGY ON-TREATMENT VISIT  01/20/23    Diagnosis:  Cancer Staging  Malignant neoplasm of right lung [facility])  Staging form: Lung, AJCC 8th Edition  - Clinical stage from 11/20/2020: Stage LATA (cTX, cN0, cM1a) - Signed by Maury Cardoza MD on 9/1/2022      This is a 81 y.o. female who is currently receiving salvage radiation to an axillary and neck sammy metastasis from lung cancer. Current RT dose: 21/45 Gy in 7/15 fractions. Concurrent systemic therapy:     Subjective: Today, the patient reports no acute complaints from treatment. Feels that the axillary sammy mass has decreased in size. .     Objective: There were no vitals filed for this visit. Pain 2/10    General: Alert and conversant, in NAD  Skin: Minimal erythema. Assessment:  Patient is tolerating radiation therapy well with no major treatment related complaints. Plan:  -Continue RT as planned. -Treatment images reviewed. -The patient has a documented plan of care to address pain. Pain is controlled on current regimen.       Jaydon Hall MD  01/20/23

## 2023-01-23 ENCOUNTER — APPOINTMENT (OUTPATIENT)
Dept: RADIATION ONCOLOGY | Age: 83
End: 2023-01-23
Payer: MEDICARE

## 2023-01-23 PROCEDURE — 77386 HC NTSTY MODUL RAD TX DLVR CPLX: CPT

## 2023-01-24 ENCOUNTER — APPOINTMENT (OUTPATIENT)
Dept: RADIATION ONCOLOGY | Age: 83
End: 2023-01-24
Payer: MEDICARE

## 2023-01-24 PROCEDURE — 77386 HC NTSTY MODUL RAD TX DLVR CPLX: CPT

## 2023-01-25 ENCOUNTER — APPOINTMENT (OUTPATIENT)
Dept: RADIATION ONCOLOGY | Age: 83
End: 2023-01-25
Payer: MEDICARE

## 2023-01-25 VITALS
SYSTOLIC BLOOD PRESSURE: 141 MMHG | WEIGHT: 140.4 LBS | DIASTOLIC BLOOD PRESSURE: 72 MMHG | OXYGEN SATURATION: 98 % | BODY MASS INDEX: 23.36 KG/M2 | HEART RATE: 76 BPM

## 2023-01-25 PROCEDURE — 77386 HC NTSTY MODUL RAD TX DLVR CPLX: CPT

## 2023-01-25 PROCEDURE — 77336 RADIATION PHYSICS CONSULT: CPT

## 2023-01-26 ENCOUNTER — APPOINTMENT (OUTPATIENT)
Dept: RADIATION ONCOLOGY | Age: 83
End: 2023-01-26
Payer: MEDICARE

## 2023-01-26 PROCEDURE — 77386 HC NTSTY MODUL RAD TX DLVR CPLX: CPT

## 2023-01-27 ENCOUNTER — TELEPHONE (OUTPATIENT)
Dept: INTERNAL MEDICINE CLINIC | Facility: CLINIC | Age: 83
End: 2023-01-27

## 2023-01-27 ENCOUNTER — APPOINTMENT (OUTPATIENT)
Dept: RADIATION ONCOLOGY | Age: 83
End: 2023-01-27
Payer: MEDICARE

## 2023-01-27 ENCOUNTER — TELEPHONE (OUTPATIENT)
Dept: RADIATION ONCOLOGY | Age: 83
End: 2023-01-27

## 2023-01-27 DIAGNOSIS — C34.81 MALIGNANT NEOPLASM OF OVERLAPPING SITES OF RIGHT LUNG (HCC): Primary | ICD-10-CM

## 2023-01-27 NOTE — TELEPHONE ENCOUNTER
Pt called requesting same day appt for cold like symptoms paired with wheezing. Informed the pt that the first available appt would be 1/30/23. Pt stated she they could not wait that long. Informed the pt to go to urgent care.

## 2023-01-27 NOTE — TELEPHONE ENCOUNTER
Patient called to say she would not be making RT tx today due to chest cold. While on the phone states that she had swelling right upper arm down to hand. Talked with Dr. Channing Vu, instruct patient to elevate arm through weekend. If swelling or pain increase over weekend go to ER. Ordered US of right arm to r/o blood clot scheduled for Monday. Patient is aware. Patient instructed to reach out to her primary MD concerning chest cold, acknowledges understanding.

## 2023-01-30 ENCOUNTER — TELEPHONE (OUTPATIENT)
Dept: RADIATION ONCOLOGY | Age: 83
End: 2023-01-30

## 2023-01-30 ENCOUNTER — APPOINTMENT (OUTPATIENT)
Dept: RADIATION ONCOLOGY | Age: 83
End: 2023-01-30
Payer: MEDICARE

## 2023-01-30 NOTE — TELEPHONE ENCOUNTER
Called to say that cold worse not coming in for RT TX today, she also cancelled US scheduled for today.

## 2023-01-31 ENCOUNTER — APPOINTMENT (OUTPATIENT)
Dept: RADIATION ONCOLOGY | Age: 83
End: 2023-01-31
Payer: MEDICARE

## 2023-02-01 ENCOUNTER — TELEPHONE (OUTPATIENT)
Dept: INTERNAL MEDICINE CLINIC | Facility: CLINIC | Age: 83
End: 2023-02-01

## 2023-02-01 NOTE — TELEPHONE ENCOUNTER
Pt called on nurse triage about right arm swelling. She wanted opinion and evaluation of where the swelling is coming from. Pt declined to see any other provider in the office so she will talk about the swelling on her upcoming appointment.     Thank you    Robinson Kuhn

## 2023-02-06 ENCOUNTER — HOSPITAL ENCOUNTER (OUTPATIENT)
Dept: ULTRASOUND IMAGING | Age: 83
Discharge: HOME | End: 2023-02-09

## 2023-02-06 DIAGNOSIS — C34.81 MALIGNANT NEOPLASM OF OVERLAPPING SITES OF RIGHT LUNG (HCC): ICD-10-CM

## 2023-02-14 ENCOUNTER — OFFICE VISIT (OUTPATIENT)
Dept: INTERNAL MEDICINE CLINIC | Facility: CLINIC | Age: 83
End: 2023-02-14
Payer: MEDICARE

## 2023-02-14 VITALS
SYSTOLIC BLOOD PRESSURE: 126 MMHG | BODY MASS INDEX: 22.66 KG/M2 | HEIGHT: 65 IN | OXYGEN SATURATION: 98 % | WEIGHT: 136 LBS | DIASTOLIC BLOOD PRESSURE: 64 MMHG | TEMPERATURE: 97.4 F | HEART RATE: 90 BPM

## 2023-02-14 DIAGNOSIS — C34.81 MALIGNANT NEOPLASM OF OVERLAPPING SITES OF RIGHT LUNG (HCC): ICD-10-CM

## 2023-02-14 DIAGNOSIS — K58.0 IRRITABLE BOWEL SYNDROME WITH DIARRHEA: ICD-10-CM

## 2023-02-14 DIAGNOSIS — F51.02 INSOMNIA DUE TO STRESS: ICD-10-CM

## 2023-02-14 DIAGNOSIS — I89.0 LYMPHEDEMA OF RIGHT ARM: Primary | ICD-10-CM

## 2023-02-14 DIAGNOSIS — L29.0 RECTAL ITCHING: ICD-10-CM

## 2023-02-14 PROCEDURE — 1123F ACP DISCUSS/DSCN MKR DOCD: CPT | Performed by: INTERNAL MEDICINE

## 2023-02-14 PROCEDURE — G8400 PT W/DXA NO RESULTS DOC: HCPCS | Performed by: INTERNAL MEDICINE

## 2023-02-14 PROCEDURE — G8420 CALC BMI NORM PARAMETERS: HCPCS | Performed by: INTERNAL MEDICINE

## 2023-02-14 PROCEDURE — 1090F PRES/ABSN URINE INCON ASSESS: CPT | Performed by: INTERNAL MEDICINE

## 2023-02-14 PROCEDURE — 1036F TOBACCO NON-USER: CPT | Performed by: INTERNAL MEDICINE

## 2023-02-14 PROCEDURE — G8484 FLU IMMUNIZE NO ADMIN: HCPCS | Performed by: INTERNAL MEDICINE

## 2023-02-14 PROCEDURE — G8427 DOCREV CUR MEDS BY ELIG CLIN: HCPCS | Performed by: INTERNAL MEDICINE

## 2023-02-14 PROCEDURE — 99214 OFFICE O/P EST MOD 30 MIN: CPT | Performed by: INTERNAL MEDICINE

## 2023-02-14 RX ORDER — HYOSCYAMINE SULFATE 0.12 MG/1
0.12 TABLET SUBLINGUAL EVERY 4 HOURS PRN
Qty: 270 EACH | Refills: 5 | Status: SHIPPED | OUTPATIENT
Start: 2023-02-14

## 2023-02-14 RX ORDER — HYDROCORTISONE ACETATE, PRAMOXINE HCL 2.5; 1 G/100G; G/100G
CREAM TOPICAL 3 TIMES DAILY
Qty: 57 G | Refills: 5 | Status: SHIPPED | OUTPATIENT
Start: 2023-02-14

## 2023-02-14 RX ORDER — TEMAZEPAM 15 MG/1
15 CAPSULE ORAL NIGHTLY
Qty: 30 CAPSULE | Refills: 5 | Status: SHIPPED | OUTPATIENT
Start: 2023-02-14 | End: 2023-08-13

## 2023-02-14 RX ORDER — AZITHROMYCIN 250 MG/1
250 TABLET, FILM COATED ORAL SEE ADMIN INSTRUCTIONS
Qty: 1 PACKET | Refills: 0 | Status: SHIPPED | OUTPATIENT
Start: 2023-02-14 | End: 2023-02-19

## 2023-02-14 SDOH — ECONOMIC STABILITY: HOUSING INSECURITY
IN THE LAST 12 MONTHS, WAS THERE A TIME WHEN YOU DID NOT HAVE A STEADY PLACE TO SLEEP OR SLEPT IN A SHELTER (INCLUDING NOW)?: NO

## 2023-02-14 SDOH — ECONOMIC STABILITY: FOOD INSECURITY: WITHIN THE PAST 12 MONTHS, THE FOOD YOU BOUGHT JUST DIDN'T LAST AND YOU DIDN'T HAVE MONEY TO GET MORE.: NEVER TRUE

## 2023-02-14 SDOH — ECONOMIC STABILITY: INCOME INSECURITY: HOW HARD IS IT FOR YOU TO PAY FOR THE VERY BASICS LIKE FOOD, HOUSING, MEDICAL CARE, AND HEATING?: NOT HARD AT ALL

## 2023-02-14 SDOH — ECONOMIC STABILITY: FOOD INSECURITY: WITHIN THE PAST 12 MONTHS, YOU WORRIED THAT YOUR FOOD WOULD RUN OUT BEFORE YOU GOT MONEY TO BUY MORE.: NEVER TRUE

## 2023-02-14 ASSESSMENT — PATIENT HEALTH QUESTIONNAIRE - PHQ9
SUM OF ALL RESPONSES TO PHQ9 QUESTIONS 1 & 2: 0
SUM OF ALL RESPONSES TO PHQ QUESTIONS 1-9: 0
2. FEELING DOWN, DEPRESSED OR HOPELESS: 0
SUM OF ALL RESPONSES TO PHQ QUESTIONS 1-9: 0
1. LITTLE INTEREST OR PLEASURE IN DOING THINGS: 0
SUM OF ALL RESPONSES TO PHQ QUESTIONS 1-9: 0
SUM OF ALL RESPONSES TO PHQ QUESTIONS 1-9: 0

## 2023-02-14 ASSESSMENT — ENCOUNTER SYMPTOMS
ABDOMINAL PAIN: 0
WHEEZING: 0
COUGH: 0
SHORTNESS OF BREATH: 0

## 2023-02-14 NOTE — PATIENT INSTRUCTIONS
Follow up with me in 3 months. Restoril 15 mg at night for sleep as needed. Refer to PT for lymphedema. May need compression therapy/ massage therapy/ and or compression sleeve? Vascular ultrasound neg for DVT. CT PET images reviewed. Completed XRT. Follow up PET with Dr. Anila Gilliland in April 2023. Oncology and XRT notes reviewed.

## 2023-02-14 NOTE — PROGRESS NOTES
ASSESSMENT/PLAN:    Patient Instructions   Follow up with me in 3 months. Restoril 15 mg at night for sleep as needed. Refer to PT for lymphedema. May need compression therapy/ massage therapy/ and or compression sleeve? Vascular ultrasound neg for DVT. CT PET images reviewed. Completed XRT. Follow up PET with Dr. Erin Gooden in April 2023. Oncology and XRT notes reviewed. Evaluation and management of the chronic condition(s) delineated. No negative side effects reported. I have reviewed all the lab results. There are some abnormalities that are either expected or not critical to the patient's health, and are discussed in the office today and are addressed. Please refer to the above assessement and plan narrative and orders and follow up plan. Medication discussed and refilled as needed. Physical exam findings are stable unless otherwise indicated and this is addressed. The most recent lab work and imaging and consultant/urgent care visits and imaging are reviewed and discussed and considered during this visit encounter. Donnette Aase was seen today for irritable bowel syndrome, fatigue, gastroesophageal reflux and medication refill. Diagnoses and all orders for this visit:    Lymphedema of right arm  -     [facility] - Physical [facility]    Irritable bowel syndrome with diarrhea  -     Hyoscyamine Sulfate SL 0.125 MG SUBL; Take 0.125 mg by mouth every 4 hours as needed (cramping or diarrhea)    Rectal itching  -     Pramoxine-HC (HYDROCORTISONE-PRAMOXINE) 2.5-1 % CREA cream; Place rectally 3 times daily PRN    Malignant neoplasm of overlapping sites of right lung ([facility] [address]  -     [facility] - [facility]    Insomnia due to stress  -     temazepam (RESTORIL) 15 MG capsule; Take 1 capsule by mouth at bedtime for 180 days. Max Daily Amount: 15 mg    Other orders  -     azithromycin (ZITHROMAX) 250 MG tablet;  Take 1 tablet by mouth See Admin Instructions for 5 days 500mg on day 1 followed by 250mg on days 2 - 5          On this date, 23, I have spent 31 minutes reviewing previous notes, test results and face to face with the patient discussing the diagnosis and importance of compliance with the treatment plan as well as documenting on the day of the visit. An electronic signature was used to authenticate this note. -- Reji Coelho MD     Return in about 3 months (around 2023) for re check. No labs needed prior to visit with me in 3 months. SUBJECTIVE/OBJECTIVE:    HPI:   Jaskaran Cartagena (: 1940 is a 81 y.o. female, here for evaluation of the following chief complaint(s):   Chief Complaint   Patient presents with    Irritable Bowel Syndrome     6 month recheck    Fatigue    Gastroesophageal Reflux    Medication Refill       Patient is here for follow-up and management of chronic medical conditions, review of recent labs, review of any imaging completed since our last office visit and discuss any consultants opinions or management changes. Labs reviewed. Medication refilled    Oncology notes from Dr. Dheeraj Gutierrez reviewed. Carbo/Alimta/Keytruda. Presumed metastatic adenocarcinoma of lung. Recent PET imaging reviewed. maintenance pembrolizumab       CHF stable. Volume status stable. EF40-45%         Labs reviewed and discussed and medication refilled as needed for chronic medications during ov or adjusted based on lab results and/or our discussion as appropriate. See discussion. The patient's available records and electronic chart records are reviewed. The PMH, PSH, medications, allergies, medications, FH, health maintenance and vaccination status are all reviewed and updated as appropriate. Records from outside providers have been reviewed, summarized, and considered as noted in the history of present illness, past medical history, and objective data of this note and encounter.           Health Maintenance Topic Date Due    COVID-19 Vaccine (1) Never done    Pneumococcal 65+ years Vaccine (1 - PCV) Never done    Shingles vaccine (1 of 2) Never done    DEXA (modify frequency per FRAX score)  Never done    Flu vaccine (1) Never done    Annual Wellness Visit (AWV)  02/16/2023    Depression Screen  12/08/2023    DTaP/Tdap/Td vaccine (2 - Td or Tdap) 06/27/2028    Hepatitis A vaccine  Aged Out    Hib vaccine  Aged Out    Meningococcal (ACWY) vaccine  Aged Out     Patient Active Problem List   Diagnosis    Gastroesophageal reflux disease without esophagitis    Hypoxemia    Rectal itching    Rectocele    Refused influenza vaccine    Refused pneumococcal vaccine    Refused varicella vaccine    Pelvic prolapse    Systolic CHF, chronic (HCC)    Insomnia due to stress    Malignant neoplasm of right lung (HCC)    Right thyroid nodule    Severe anemia    Fatigue    Postmenopausal atrophic vaginitis    Pleural effusion    Irritable bowel syndrome with diarrhea       Review of Systems   Constitutional:  Positive for fatigue. HENT:  Positive for congestion. Respiratory:  Negative for cough, shortness of breath and wheezing. Gastrointestinal:  Negative for abdominal pain. Musculoskeletal:         Upper Arm lymphedema, right   Skin: Negative.       Lab Results   Component Value Date/Time    WBC 6.5 12/30/2022 01:32 PM    HGB 13.2 12/30/2022 01:32 PM    HCT 39.5 12/30/2022 01:32 PM    MCV 94.5 12/30/2022 01:32 PM    RDW 13.7 12/30/2022 01:32 PM     12/30/2022 01:32 PM    NEUTOPHILPCT 68 04/28/2022 01:19 PM    LYMPHOPCT 25 12/30/2022 01:32 PM    LYMPHOPCT 19 04/28/2022 01:19 PM    MONOPCT 9 12/30/2022 01:32 PM    MONOPCT 8 04/28/2022 01:19 PM    EOSRELPCT 4 12/30/2022 01:32 PM    BASOPCT 1 12/30/2022 01:32 PM    BASOPCT 0 04/28/2022 01:19 PM    LYMPHSABS 1.6 12/30/2022 01:32 PM    LYMPHSABS 1.3 04/28/2022 01:19 PM    MONOSABS 0.6 12/30/2022 01:32 PM    MONOSABS 0.5 04/28/2022 01:19 PM    EOSABS 0.3 12/30/2022 01:32 PM EOSABS 0.3 04/28/2022 01:19 PM    BASOSABS 0.0 12/30/2022 01:32 PM    IMMGRAN 1 12/30/2022 01:32 PM    GRANULOCYTEABSOLUTECOUNT 0.0 04/28/2022 01:19 PM       Lab Results   Component Value Date/Time     12/30/2022 01:32 PM    K 4.2 12/30/2022 01:32 PM     12/30/2022 01:32 PM    CO2 31 12/30/2022 01:32 PM    ANIONGAP 5 12/30/2022 01:32 PM    GLUCOSE 92 12/30/2022 01:32 PM    BUN 10 12/30/2022 01:32 PM    CREATININE 0.70 12/30/2022 01:32 PM    GFRAA >60 09/01/2022 01:50 PM    LABGLOM >60 12/30/2022 01:32 PM    CALCIUM 10.0 12/30/2022 01:32 PM    BILITOT 0.5 12/30/2022 01:32 PM    ALT 25 12/30/2022 01:32 PM    AST 20 12/30/2022 01:32 PM    ALKPHOS 84 12/30/2022 01:32 PM    ALKPHOS 78 04/28/2022 01:19 PM    PROT 6.9 12/30/2022 01:32 PM    LABALBU 3.9 12/30/2022 01:32 PM    GLOB 3.0 12/30/2022 01:32 PM    ALBUMIN 1.3 12/30/2022 01:32 PM       Lab Results   Component Value Date/Time    CHOL 191 08/09/2022 02:51 PM    HDL 59 08/09/2022 02:51 PM    TRIG 81 08/09/2022 02:51 PM    LDLCALC 115.8 08/09/2022 02:51 PM    VLDL 13 02/08/2022 10:34 AM           Lab Results   Component Value Date/Time    TSH 0.738 04/28/2022 01:19 PM    TSH 3.640 10/22/2021 09:34 AM    TSH 1.450 09/09/2021 09:14 AM           Lab Results   Component Value Date/Time    SPECGRAV 1.008 08/16/2022 05:04 PM    PHUR 6.0 02/15/2022 12:09 PM    COLORU YELLOW/STRAW 08/16/2022 05:04 PM    CLARITYU Cloudy 02/15/2022 12:09 PM    LEUKOCYTESUR Negative 08/16/2022 05:04 PM    PROTEINU Negative 08/16/2022 05:04 PM    GLUCOSEU Negative 08/16/2022 05:04 PM    KETUA Negative 08/16/2022 05:04 PM    BLOODU Negative 08/16/2022 05:04 PM    BILIRUBINUR Negative 08/16/2022 05:04 PM    BILIRUBINUR Negative 02/15/2022 12:09 PM    UROBILINOGEN 0.2 08/16/2022 05:04 PM    NITRU Negative 08/16/2022 05:04 PM    LABMICR See additional order 02/15/2022 12:09 PM         Results for orders placed or performed during the hospital encounter of 02/06/23 (from the past 2160 hour(s))   Vascular duplex upper extremity venous right    Impression    No ultrasound evidence of right upper extremity venous thrombosis.   Prominent lymph nodes seen in the right axillary space not significantly changed  compared with PET/CT scan December 6, 2022     Results for orders placed or performed during the hospital encounter of 12/30/22 (from the past 2160 hour(s))   CBC with Auto Differential   Result Value Ref Range    WBC 6.5 4.3 - 11.1 K/uL    RBC 4.18 4.05 - 5.2 M/uL    Hemoglobin 13.2 11.7 - 15.4 g/dL    Hematocrit 39.5 35.8 - 46.3 %    MCV 94.5 82.0 - 102.0 FL    MCH 31.6 26.1 - 32.9 PG    MCHC 33.4 31.4 - 35.0 g/dL    RDW 13.7 11.9 - 14.6 %    Platelets 475 011 - 398 K/uL    MPV 10.0 9.4 - 12.3 FL    nRBC 0.00 0.0 - 0.2 K/uL    Differential Type AUTOMATED      Seg Neutrophils 62 43 - 78 %    Lymphocytes 25 13 - 44 %    Monocytes 9 4.0 - 12.0 %    Eosinophils % 4 0.5 - 7.8 %    Basophils 1 0.0 - 2.0 %    Immature Granulocytes 1 0.0 - 5.0 %    Segs Absolute 4.0 1.7 - 8.2 K/UL    Absolute Lymph # 1.6 0.5 - 4.6 K/UL    Absolute Mono # 0.6 0.1 - 1.3 K/UL    Absolute Eos # 0.3 0.0 - 0.8 K/UL    Basophils Absolute 0.0 0.0 - 0.2 K/UL    Absolute Immature Granulocyte 0.0 0.0 - 0.5 K/UL   Comprehensive Metabolic Panel   Result Value Ref Range    Sodium 139 133 - 143 mmol/L    Potassium 4.2 3.5 - 5.1 mmol/L    Chloride 103 101 - 110 mmol/L    CO2 31 21 - 32 mmol/L    Anion Gap 5 2 - 11 mmol/L    Glucose 92 65 - 100 mg/dL    BUN 10 8 - 23 MG/DL    Creatinine 0.70 0.6 - 1.0 MG/DL    Est, Glom Filt Rate >60 >60 ml/min/1.73m2    Calcium 10.0 8.3 - 10.4 MG/DL    Total Bilirubin 0.5 0.2 - 1.1 MG/DL    ALT 25 12 - 65 U/L    AST 20 15 - 37 U/L    Alk Phosphatase 84 50 - 136 U/L    Total Protein 6.9 6.3 - 8.2 g/dL    Albumin 3.9 3.2 - 4.6 g/dL    Globulin 3.0 2.8 - 4.5 g/dL    Albumin/Globulin Ratio 1.3 0.4 - 1.6     Results for orders placed or performed during the hospital encounter of 12/13/22 (from the past 2160 hour(s))   MRI BRAIN W WO CONTRAST    Impression    No acute abnormality or evidence of intracranial metastatic disease. Mild global cerebral atrophy and mild chronic white matter microvascular  ischemic changes. Results for orders placed or performed during the hospital encounter of 12/06/22 (from the past 2160 hour(s))   PET CT SKULL BASE TO MID THIGH    Impression    FDG avid enlarged lymph nodes in the right neck base, posterior supra clavicular  space, and axilla. No FDG avid disease elsewhere. POCT Glucose   Result Value Ref Range    POC Glucose 104 (H) 65 - 100 mg/dL    Performed by: Judi/Pet    Results for orders placed or performed during the hospital encounter of 11/22/22 (from the past 2160 hour(s))   US BIOPSY LYMPH NODE    Impression    Technically successful core biopsy of a right axillary lymph node. Specimen: Sent to pathology    Plan: The patient is discharged to home in good and stable condition. Ultrasound Result (most recent):  US LYMPH NODE BIOPSY 11/22/2022    Narrative  Title: Ultrasound guided axillary lymph node. History: 26-year-old female with an enlarged right axillary lymph node and a  history of lung cancer. Biopsy requested. :  Rizwan Crabtree PA-C    Supervising Physician: Porter Albright M.D. Consent: Informed written and oral consent was obtained from the patient after  explanation of benefits and risks (including, but not limited to: Infection,  Hemorrhage, Nerve injury) of procedure. Procedure: Maximal sterile barrier technique was used. Following routine prep  and drape of the right axilla, a local field block with 1% lidocaine was  achieved. Ultrasound evaluation of the right axilla was performed. Using  real-time ultrasound guidance, 4 18-gauge, 2 cm core biopsies were obtained and  placed in formalin and RPMI. Following the biopsy, ultrasound examination demonstrates no evidence of  hematoma.     A dressing was applied. Complications: None. Medications: None. Findings: 3.4 cm right axillary lymph node. Impression  Technically successful core biopsy of a right axillary lymph node. Specimen: Sent to pathology    Plan: The patient is discharged to home in good and stable condition. 02/06/23    VAS DUP UPPER EXTREMITY VENOUS RIGHT 02/06/2023 12:38 PM (Final)    Narrative  TITLE: Upper extremity venous ultrasound examination. INDICATION: Right extremity pain and swelling. TECHNIQUE: Grayscale, Color, and Spectral Doppler interrogation performed. COMPARISON: None. FINDINGS: Normal compressibility and flow waveform of the right upper extremity  veins including forearm veins cephalic vein, basilic vein, axillary, subclavian  and innominate vein. Right internal jugular vein is compressible and patent    Impression  No ultrasound evidence of right upper extremity venous thrombosis.   Prominent lymph nodes seen in the right axillary space not significantly changed  compared with PET/CT scan December 6, 2022    Signed by: Josias Price MD on 2/6/2023 12:38 PM      Vitals:    02/14/23 1123   BP: 126/64   Site: Left Upper Arm   Position: Sitting   Cuff Size: Small Adult   Pulse: 90   Temp: 97.4 °F (36.3 °C)   TempSrc: Skin   SpO2: 98%   Weight: 136 lb (61.7 kg)   Height: 5' 5\" (1.651 m)     Wt Readings from Last 3 Encounters:   02/14/23 136 lb (61.7 kg)   01/25/23 140 lb 6.4 oz (63.7 kg)   12/30/22 136 lb (61.7 kg)     BP Readings from Last 3 Encounters:   02/14/23 126/64   01/25/23 (!) 141/72   12/30/22 (!) 144/72     Physical Exam

## 2023-02-21 ENCOUNTER — HOSPITAL ENCOUNTER (OUTPATIENT)
Dept: PHYSICAL THERAPY | Age: 83
Setting detail: RECURRING SERIES
Discharge: HOME | End: 2023-02-24
Payer: MEDICARE

## 2023-02-21 PROCEDURE — 97166 OT EVAL MOD COMPLEX 45 MIN: CPT

## 2023-02-21 PROCEDURE — 97535 SELF CARE MNGMENT TRAINING: CPT

## 2023-02-21 ASSESSMENT — PAIN SCALES - GENERAL: PAINLEVEL_OUTOF10: 2

## 2023-02-21 NOTE — THERAPY EVALUATION
Aidan Guidry  : 1940  Primary: Medicare Part A And B (Medicare)  Secondary: [facility] @ [address]  [address] [place] [address]  Phone: [phone]  Fax: [phone]    OT Visit Info:  Certification Period Expiration Date: 23  Total # of Visits to Date: 1   Visit Count: Visit count could not be calculated. Make sure you are using a visit which is associated with an episode. OUTPATIENT OCCUPATIONAL THERAPY:OP NOTE TYPE: Initial Assessment 2023  Episode: (lymphedema)   Appt Desk        Treatment Diagnosis:  Localized edema (R60.1)  Lymphedema, not elsewhere classified (I89.0)  N64.9, Disorder of breast, unspecified  Medical/Referring Diagnosis:  Lymphedema of right arm [I89.0]  Malignant neoplasm of overlapping sites of right lung [facility]) [C34.81]  Referring Physician:  Gerardo Lennon MD MD Orders:  OT Eval and Treat     Return MD Appt:  TBD  Date of Onset: 2023  Allergies:  Levonorgestrel-ethinyl estrad, Ciprofibrate, Ciprofloxacin, and Penicillins  Restrictions/Precautions:    No data recorded  No data recorded  Medications Last Reviewed:  2023     SUBJECTIVE   History of Injury/Illness (Reason for Referral):  Ms. Daria Gilford presents with R upper extremity, right breast, and right axillary upper quadrant edema. She was diagnosed with lung cancer in 2020. She received chemo and immunotherapy, ultimately going into remission. In 2023, she was diagnosed with cancer of the axillary lymph nodes. She received radiation x 11 treatments and then stopped the treatment due to development of lymphedema in the right upper quadrant.   She is here to manage lymphedema  Patient Stated Goal(s):  \"reduce the swelling\"  Initial:     2/10 Post Session:     2/10  Past Medical History/Comorbidities:   Ms. Daria Gilford  has a past medical history of Arthritis, Autoimmune disease ([address] Cancer ([address] Compression fracture of T12 vertebra ([address] Endometriosis, Fibrocystic breast, Fibromyalgia, GERD (gastroesophageal reflux disease), Glaucoma, Headache, Heart failure (HCC), IBS (irritable bowel syndrome), Ovarian cyst, Pleural effusion, Rectal itching, Severe anemia, Stress due to spouse with dementia, UTI (urinary tract infection), and Vaginitis, atrophic. Ms. Stephanie [place]  has a past surgical history that includes Rectocele repair (1990); ovarian cyst removal (1984); IR GUIDED THORACENTESIS PLEURAL (12/11/2020); IR PORT PLACEMENT > 5 YEARS (12/7/2020); IR REMOVE TUNNELED CVAD W SQ PORT/PUMP INSERT (5/9/2022); Fixation Kyphoplasty (N/A, 08/2014); Hysterectomy (Bilateral, 1986); Vaginal prolapse repair (2009); Hemorrhoid surgery (1970); Breast biopsy (Bilateral, 1980); IR PORT PLACEMENT > 5 YEARS (12/7/2020); and US BIOPSY LYMPH NODE (11/22/2022). Social History/Living Environment:   Lives alone independendently     Prior Level of Function/Work/Activity:   No data recorded  No data recorded  No data recorded     Learning:   Does the patient/guardian have any barriers to learning?: No barriers  Will there be a co-learner?: No  What is the preferred language of the patient/guardian?: English  Is an  required?: No  How does the patient/guardian prefer to learn new concepts?: Listening; Reading; Demonstration     Fall Risk Scale  Hwang Total Score: 15  Hwang Fall Risk: Low (0-24)           OBJECTIVE   Palpation  Soft, +1 pitting edema R UE.   Swelling into breast and axilla into back in upper right quadrant    ROM  Right shoulder stiffness with functional AROM    Strength  Generalized weakness    Special Tests  N/A    Skin Integrity  Skin dryness; no lymphostatic fibrosis or hyperpigmentation noted    Sensation  Grossly intact    Activities of Daily Living Independent    Edema/Girth   PRETREATMENT AFFECTED LIMB(s): right upper extremity      Date:  2.21.23       Right / Left         Axilla   [x]      [] 29 cm        3 inches   [x]      [] 25.5        Elbow   [x]      [] 25        6 inches   [x]      [] 24        3 inches   [x]      [] 21        Wrist   [x]      [] 17.5        Palm   [x]      [] 18      Measurements are taken in centimeters:  2.54 cm = 1 inch  Cumulative circumferential volumetric graph measurements  RUE limb size   160 cm             ASSESSMENT   Initial Assessment:  Ms. Agnieszka Womack presents with R upper extremity, right breast, and right axillary upper quadrant edema. She was diagnosed with lung cancer in September 2020. She received chemo and immunotherapy, ultimately going into remission. In January of 2023, she was diagnosed with cancer of the axillary lymph nodes. She received radiation x 11 treatments and then stopped the treatment due to development of lymphedema in the right upper quadrant. She is here to manage lymphedema  Problem List (Impacting functional limitations):    Decreased activity tolerance     Edema/girth    Decreased skin integrity/hygiene    Limb heaviness     Therapy Prognosis:   Prognosis: Good     Assessment Complexity:   Medium Complexity  PLAN   Effective Dates: 6/45/31  TO Certification Period Expiration Date: 05/22/23 . Frequency/Duration: Twice a week for up to 90 days  Interventions Planned: (Treatment may consist of any combination of the following)  Manual therapy, ADLs/self care, Therapeutic exercise, patient education       GOALS:  Short-Term Functional Goals: Time Frame: 45 days  1. The patient/caregiver will verbalize understanding of lymphedema precautions. 2. Patient will be independent with skin care regimen to decrease risk of cellulitis. 3. The patient/caregiver will be moderate assist at donning and doffing right upper extremity compression bandages. 4. The patient/caregiver will be independent with self-manual lymph drainage techniques and show decrease in limb volume. 5. Patient will be independent in lymphatic exercises. Discharge Goals: Time Frame: 90 days  1.  Patient's right lower extremity circumferential measurements will decrease on volumetric graph to maximize functional use in ADL's.    2. The patient/caregiver will be independent with home management of lymphedema. 3. Patient/caregiver will be independent donning and doffing right lower extremity compression garment. Outcome Measure: Tool Used: Lower Extremity Functional Scale (LEFS)  Score:  Initial: 14/80 Most Recent: X/80 (Date: -- )   Interpretation of Score: 20 questions each scored on a 5 point scale with 0 representing \"extreme difficulty or unable to perform\" and 4 representing \"no difficulty\". The lower the score, the greater the functional disability. 80/80 represents no disability. Minimal detectable change is 9 points. MEDICAL NECESSITY:   > Patient is expected to demonstrate progress in self management of R UE lymphedema to reduce risk of cellulitis. REASON FOR SERVICES/OTHER COMMENTS:  > Patient continues to require skilled intervention due to management of lymphedema sequelae. Total Duration:  Time In: 1100  Time Out: 1145    Regarding Glenda Ambrocio Cate's therapy, I certify that the treatment plan above will be carried out by a therapist or under their direction. Thank you for this referral,  Hilton Paget, MICHAEL     Referring Physician Signature:  Lisa Jackson,* _______________________________ Date _____________        Post Session Pain  Charge Capture   POC Link  Treatment Note Link  MD Guidelines  MyChart

## 2023-02-21 NOTE — PROGRESS NOTES
Billee Route  : 1940  Primary: Medicare Part A And B (Medicare)  Secondary: Candy @ [address]  9 [address] [place] [id number]  Phone: [phone]  Fax: [phone]    OT Visit Info:   Certification Period Expiration Date: 23  Total # of Visits to Date: 1     Visit Count: Visit count could not be calculated. Make sure you are using a visit which is associated with an episode. OUTPATIENT OCCUPATIONAL THERAPY: Treatment Note 2023  Episode: (lymphedema)   Appt Desk      Treatment Diagnosis:  Localized edema (R60.1)  Lymphedema, not elsewhere classified (I89.0)  N64.9, Disorder of the breast, unspecified  Medical/Referring Diagnosis:  Lymphedema of right arm [I89.0]  Malignant neoplasm of overlapping sites of right lung [facility]) [C34.81]  Referring Physician:  Cher Lam MD MD Orders:  OT Eval and Treat     Date of Onset:  Onset Date: 23     Allergies:  Levonorgestrel-ethinyl estrad, Ciprofibrate, Ciprofloxacin, and Penicillins  Restrictions/Precautions:    No data recorded  No data recorded  Medications Last Reviewed:  2023     Interventions Planned: (Treatment may consist of any combination of the following)  No data recorded   Subjective Comments:     Initial:     2/10 Post Session:     2/10  Medications Last Reviewed:  2023  Updated Objective Findings:  See evaluation note from today  Treatment   THERAPEUTIC EXERCISE: ( minutes): MANUAL THERAPY: ( minutes):   Manual lymphatic drainage was utilized and necessary because of the patient's  right upper quadrant secondary lymphedema .    Manual Lymph Drainage:   Lymph Nodes:    Cervical Supraclavicular Axillary Abdominal Inguinal Popliteal Antecubital   RIGHT []     []     []     []     []     []     []       LEFT []     []     []     []     []     []     []         Anastamoses:   Axillo-axillary Inguino-inguinal Axillo-inguinal Inguino-axillary   ANTERIOR [] []     []     []       POSTERIOR []     []     []     []       RIGHT []     []     []     []       LEFT []     []     []     []         Limbs:   []    RUE     []    LUE     []    RLE    []    LLE    SELF CARE/ADLs  (15 minutes)   Pt was educated on lymphatic pathophysiology, complete decongestive therapy, precautions and skin integrity management. Treatment/Session Summary:    Treatment Assessment:  Pt in agreement with POC and goals     Communication/Consultation:   Evaluation and POC sent to MD for signature  Equipment provided today:  Patient education materials  Recommendations/Intent for next treatment session: Next visit will focus on phase 1 complete decongestive therapy.     Total Treatment Billable Duration: 45 minutes  OT Individual Minutes  Time In: 1100  Time Out: 8607  Minutes: 45    Makeda Whitestone, OTR/L, CLT-AMIRA    Post Session Pain  Charge Capture   POC Link  Treatment Note Link  MD Guidelines  MyChart    Future Appointments   Date Time Provider Tod Jacquie   2/23/2023 10:00 AM Makeda Whitestone, OT [facility]   2/28/2023 11:00 AM Teresa [address] [place] [address]   2/28/2023 11:30 AM Saul Baker MD [facility] AM[facility]   2/28/2023  1:00 PM [facility], OT [facility]   3/3/2023 10:00 AM [facility] Whitestone, OT [facility]   3/7/2023 11:00 AM Makeda Whitestone, OT [facility]   3/10/2023 11:00 AM Makeda Whitestone, OT [facility]   3/14/2023 10:00 AM [facility], OT [facility]   3/17/2023 11:00 AM [facility], OT [facility]   5/22/2023 11:40 AM Leighton Blanco MD [facility] AMB

## 2023-02-23 ENCOUNTER — HOSPITAL ENCOUNTER (OUTPATIENT)
Dept: PHYSICAL THERAPY | Age: 83
Setting detail: RECURRING SERIES
Discharge: HOME | End: 2023-02-26
Payer: MEDICARE

## 2023-02-23 DIAGNOSIS — C34.91 MALIGNANT NEOPLASM OF RIGHT LUNG, UNSPECIFIED PART OF LUNG (HCC): Primary | ICD-10-CM

## 2023-02-23 PROCEDURE — 97535 SELF CARE MNGMENT TRAINING: CPT

## 2023-02-23 PROCEDURE — 97140 MANUAL THERAPY 1/> REGIONS: CPT

## 2023-02-23 ASSESSMENT — PAIN SCALES - GENERAL: PAINLEVEL_OUTOF10: 2

## 2023-02-23 NOTE — PROGRESS NOTES
Tera Massimo  : 1940  Primary: Medicare Part A And B (Medicare)  Secondary: Candy @ [address]  9 [address] [place] [address] Jose Alfredo Noble [address] [address]  Phone: [phone]  Fax: [phone]    OT Visit Info:   Certification Period Expiration Date: 23  Total # of Visits to Date: 2     Visit Count: Visit count could not be calculated. Make sure you are using a visit which is associated with an episode. OUTPATIENT OCCUPATIONAL THERAPY: Treatment Note 2023  Episode: (lymphedema)   Appt Desk      Treatment Diagnosis:  Localized edema (R60.1)  Lymphedema, not elsewhere classified (I89.0)  N64.9, Disorder of the breast, unspecified  Medical/Referring Diagnosis:  No admission diagnoses are documented for this encounter. Referring Physician:  Dorene Jean MD MD Orders:  OT Eval and Treat     Date of Onset:  Onset Date: 23     Allergies:  Levonorgestrel-ethinyl estrad, Ciprofibrate, Ciprofloxacin, and Penicillins  Restrictions/Precautions:    No data recorded  No data recorded  Medications Last Reviewed:  2023     Interventions Planned: (Treatment may consist of any combination of the following)  No data recorded   Subjective Comments:     Initial:     2/10 Post Session:     2/10  Medications Last Reviewed:  2023  Updated Objective Findings:  See evaluation note from today  Treatment   THERAPEUTIC EXERCISE: ( minutes): MANUAL THERAPY: (30 minutes):   Manual lymphatic drainage was utilized and necessary because of the patient's  right upper quadrant secondary lymphedema .   Skin care followed by application of Eucerin lotion  Manual Lymph Drainage:   Lymph Nodes:    Cervical Supraclavicular Axillary Abdominal Inguinal Popliteal Antecubital   RIGHT [x]     [x]     [x]     [x]     []     []     []       LEFT [x]     [x]     [x]     [x]     [x]     []     []         Anastamoses:   Axillo-axillary Inguino-inguinal Axillo-inguinal Inguino-axillary   ANTERIOR [x]     []     [x]     []       POSTERIOR [x]     []     []     []       RIGHT [x]     []     [x]     []       LEFT []     []     []     []         Limbs:   [x]    RUE     []    LUE     []    RLE    []    LLE    SELF CARE/ADLs  (30 minutes)  R UE bandaging using Surgigrip, Komprex II foam pads on forearm and upper arm and Artilex padding at anterior elbow crease. Utilized 3 short stretch bandages from hand to axilla. Pt instructed in general ROM exercises, elevation, and opening and closing hand during the day to facilitate lymphatic pumping with bandages on. Provided laundering instructions. Treatment/Session Summary:    Treatment Assessment:  Tolerated treatment without complication. Communication/Consultation:  None today  Equipment provided today:  Bandaging supplies  Recommendations/Intent for next treatment session: Next visit will focus on phase 1 complete decongestive therapy.     Total Treatment Billable Duration:   OT Individual Minutes  Time In: 1000  Time Out: 1100  Minutes: 61    Venessa Jain OTR/L, CLT-AMIRA    Post Session Pain  Charge Capture   POC Link  Treatment Note Link  MD Guidelines  MyChart    Future Appointments   Date Time Provider [facility]   2/28/2023 11:00 AM Teresa [address] [place] [address]   2/28/2023 11:30 AM Go Ayala MD [facility] AMB   2/28/2023  1:00 PM Venessa Jain OT [facility]   3/3/2023 10:00 AM Venessa Jain, OT [facility]   3/7/2023 11:00 AM Venessa Jain OT [facility]   3/10/2023 11:00 AM Venessaalberto Jain, OT [facility]   3/14/2023 10:00 AM Venessaalberto Jain, OT [facility]   3/17/2023 11:00 AM Venessaalberto Jain, OT [facility]   5/22/2023 11:40 AM Alexus Madden MD [facility] AMB

## 2023-02-26 NOTE — PROGRESS NOTES
[facility] Hematology and Oncology: Office Visit Established Patient    Reason for follow up:    Dev Rader  is seen in follow-up for lung cancer. Overview: (copied from prior)   Ms. Grover Cortes was seen for the first time in our office in November 2020. She was a woman of generally good health prior to recent events. She had some chronic medical issues none of which intruded into  her day-to-day function. Her presenting illness happened in the context of the death of her  in July 2020. She was his primary caregiver and had become somewhat depleted during that process attributing her symptoms to the stress involved with  his care. Beginning in mid October, she developed what she felt was an evolving bronchitis with cough and general malaise. Over the subsequent week she was treated with antibiotics and inhalers without benefit ultimately getting to the point where she  felt she could not take in a deep breath and presented to the emergency department on October 26, 2020. At that time her resting oxygen saturation was 87%. A pleural effusion on the right side was seen on chest x-ray and confirmed by CT scan with collapse  of the right lower lobe as well as a majority of the right middle lobe with mass-effect on the mediastinum. She underwent a thoracentesis removing initially 2800 cc of fluid and then on a subsequent day 1400 cc of fluid with significant improvement in  her sense of wellbeing. The fluid was described by the patient as bloody and subsequent pathology demonstrated the presence of an adenocarcinoma which stained positively for cytokeratin 7, Napsin, and TTF-1. It was negative for cytokeratin 20. CT scanning  of the neck abdomen and pelvis demonstrated no clear site of origin for this malignancy.   There was a suggestion of right-sided pleural nodularity although there remains some atelectatic change within the lung making a determination of primary lung lesion  on the right side difficult. She has not smoked since 1986 and had been an approximately 1 pack/day smoker prior to that. Her major complaint at the time of her initial presentation was that of extreme fatigue. A PET scan was performed with no definitively  obvious primary pulmonary malignancy. There is the right-sided pleural effusion as well as evidence of pleural nodularity which is somewhat FDG avid. There is no evidence of dissemination of disease elsewhere. A peripheral blood was sent for cell free  DNA analysis and disclosed a TP53 mutation but nothing else that was actionable. Began treatment with pembrolizumab paclitaxel and carboplatin in mid December 2020. Following 4 cycles of treatment, she underwent a CT scan. The study showed complete  disappearance of the right-sided pleural effusion. In addition, there was some pleural nodularity that seemed somewhat better on the scan. There were scattered pulmonary nodules which seemed stable as best one can compared to the prior study when there  was so much fluid present. She then began treatment with pembrolizumab alone. She received this medication for 6 months through September 2021. At that time, she began to have severe arthralgias which point the pembrolizumab was discontinued and she  was placed on prednisone with relief. In the fall 2022, she developed evidence of a right axillary swelling. Biopsy was performed and consistent with an adenocarcinoma of lung origin similar to the previous pathology. She then underwent a PET scan which showed no significant dissemination of her malignancy however there was evidence of FDG avidity in multiple enlarged nodes in the right axilla. There was no evidence of visceral disease. MRI of the brain showed no evidence of malignancy. She returns today for follow-up. As noted above, recent MRI was negative although she continues to feel \"foggy headed\".   In addition, she was seen by Dr. Berny Landers in radiation oncology. There is a plan for mapping of the right axilla with palliative radiation to follow totaling 4500 cGy over 15 fractions. Ms. Kristy Tucker also wanted to review the recent [employer] analysis that was performed. The study again shows a high PD-L1 score of 80%. There were no significant actionable mutations with the exception of CHEK 2 for which 1 might consider a PARP inhibitor although in lung cancer this would clearly be a non-FDA approved use of the medication. Interval history:  Patient returns for follow-up. Moderate fatigue has remained unchanged. She denies new cough or unusual shortness of breath. She denies chest pain, nausea or vomiting. Appetite has been low. She has lost about 4 pounds in last 1 month. She stopped receiving palliative radiation to right axilla after 11 fractions (1/31/2023) because of development of lymphedema in the right arm. She has been seen at lymphedema clinic and notes improvement with application of compression sleeve. She has a follow-up later today  Review of Systems:  14 point ROS was negative except as per HPI      ECOG PERFORMANCE STATUS - 1- Restricted in physically strenuous activity but ambulatory and able to carry out work of a light or sedentary nature such as light house work, office work. Pain - /10. Mild to moderate pain, requiring medication - see MAR     Fatigue - No flowsheet data found. Distress - No flowsheet data found. Reviewed and updated this visit by provider:  Tobacco  Allergies  Meds  Problems  Med Hx  Surg Hx  Fam Hx          Current Outpatient Medications   Medication Sig Dispense Refill    Hyoscyamine Sulfate SL 0.125 MG SUBL Take 0.125 mg by mouth every 4 hours as needed (cramping or diarrhea) 270 each 5    Pramoxine-HC (HYDROCORTISONE-PRAMOXINE) 2.5-1 % CREA cream Place rectally 3 times daily PRN 57 g 5    temazepam (RESTORIL) 15 MG capsule Take 1 capsule by mouth at bedtime for 180 days.  Max Daily Amount: 15 mg 30 capsule 5    ibuprofen (ADVIL;MOTRIN) 200 MG CAPS Take 1 capsule by mouth as needed for Fever      cetirizine (ZYRTEC) 10 MG tablet Take by mouth every evening      estradiol (ESTRACE) 0.1 MG/GM vaginal cream INSERT 1 GRAM TWICE A WEEK VAGINALLY AS DIRECTED      latanoprost (XALATAN) 0.005 % ophthalmic solution Place 1 drop into both eyes nightly      timolol (TIMOPTIC) 0.5 % ophthalmic solution PLACE 1 DROP IN BOTH EYES IN THE MORNING      triamcinolone (NASACORT) 55 MCG/ACT nasal inhaler 2 sprays by Nasal route daily as needed      esomeprazole (NEXIUM) 20 MG delayed release capsule Take 20 mg by mouth every morning (before breakfast) (Patient not taking: No sig reported)       No current facility-administered medications for this visit.      Facility-Administered Medications Ordered in Other Visits   Medication Dose Route Frequency Provider Last Rate Last Admin    lactated ringers infusion   IntraVENous Continuous PRN EDUARD Manning - CRNA   New Bag at 11/22/22 1122        OBJECTIVE:  /65 (Site: Left Upper Arm, Position: Sitting, Cuff Size: Medium Adult)   Pulse 77   Temp 97.5 °F (36.4 °C) (Oral)   Resp 18   Ht 5' 5\" (1.651 m)   Wt 137 lb (62.1 kg)   SpO2 95%   BMI 22.80 kg/m²   Wt Readings from Last 3 Encounters:   02/28/23 137 lb (62.1 kg)   02/14/23 136 lb (61.7 kg)   01/25/23 140 lb 6.4 oz (63.7 kg)       Physical Exam:  Patient alert and oriented x 3, in no acute distress  Integumentary: No Pallor, no icterus  HEENT: moist mucous membranes, normal oropharynx  Lymph nodes: no cervical or axillary LAD  Cardiovascular:RRR, S1, S2 present, no m/r/g   Lungs: Clear to auscultation, no rales or wheezing, no accessory muscle use  Abdomen: Soft, and non-tender on palpation, no organomegaly, bowel sounds audible  Extremities: No peripheral edema, no joint swelling  Neurological: patient can follow commands and move all extremities    Labs:  Lab Results   Component Value Date    WBC 6.5 02/28/2023 HGB 13.3 02/28/2023    HCT 39.4 02/28/2023    MCV 94.5 02/28/2023     02/28/2023     Lab Results   Component Value Date    NEUTROABS 4.6 04/28/2022    LYMPHOPCT 14 02/28/2023    LYMPHSABS 0.9 02/28/2023    MONOPCT 8 02/28/2023    MONOSABS 0.5 02/28/2023    EOSABS 0.3 02/28/2023    BASOPCT 0 02/28/2023     Lab Results   Component Value Date     02/28/2023    K 4.2 02/28/2023     02/28/2023    CO2 30 02/28/2023    BUN 12 02/28/2023    CREATININE 0.60 02/28/2023    GLUCOSE 96 02/28/2023    CALCIUM 9.6 02/28/2023    PROT 6.8 02/28/2023    LABALBU 3.6 02/28/2023    BILITOT 0.5 02/28/2023    ALKPHOS 80 02/28/2023    AST 20 02/28/2023    ALT 23 02/28/2023    LABGLOM >60 02/28/2023    GFRAA >60 09/01/2022    AGRATIO 1.4 04/28/2022    GLOB 3.2 02/28/2023               Imaging:  Vascular duplex upper extremity venous right    Result Date: 2/6/2023  No ultrasound evidence of right upper extremity venous thrombosis. Prominent lymph nodes seen in the right axillary space not significantly changed compared with PET/CT scan December 6, 2022 12/06/22:     Impression   FDG avid enlarged lymph nodes in the right neck base, posterior supra clavicular   space, and axilla. No FDG avid disease elsewhere. ASSESSMENT:  Ms. Davion Zepeda initially presented with a metastatic process that immunohistochemically typed as being consistent with a primary pulmonary malignancy although no clear pulmonary lesion was ever identified. She was however treated successfully with pembrolizumab pemetrexed and carboplatin. She then went on to receive single agent maintenance pembrolizumab which she continued through September 2021. Ultimately, this resulted in rather significant arthralgias and uveitis resulting in it being discontinued at that time. She now has evidence of a right axillary mass that has been biopsied and consistent with her prior malignancy.   CT scanning had shown the presence of this axillary mass as well as a 6 mm pulmonary nodule. PET scanning confirms the metabolic activity of the axillary mass and shows the sammy extent to the up to the supraclavicular region on the right. The pulmonary nodule was not prominent in the PET scanning but it is small. She is uninterested in additional systemic therapy but is willing to consider palliative radiation therapy. Mapping will take place next week. Given the [facility] ETOWAH 1 mutation, there is at least a theoretical rationale for the use of PARP inhibitors. PLAN:  She will undergo the palliative radiation therapy and then follow-up here. She will be set up with Dr. Waqar Dunlap in 2 months. As noted, she is not interested in systemic therapy and had toxicity with immunotherapy, specifically pembrolizumab. I defer to Dr. Waqar Dunlap and the patient as to whether or not she should get scheduled scans or just diagnostic studies associated with new symptoms. At some point she may want to retry immunotherapy with a different agent with the possibility that there may not be cross-reactive side effects. PARP inhibitors like olaparib could be considered given the above but would certainly be non-FDA approved. 2/28: She received #11 out of 15 planned radiation fractions 1/30/2023. She continues to follow with lymphedema clinic. Her pain is controlled on current analgesic regimen. She has lost a few pounds since last visit and I have encouraged her to maximize nutrition. We shall monitor her weight if this downward trend continues, she will be prescribed appetite stimulant medication. She will be scheduled for follow-up PET/CT in about 6 weeks. Ms. John Quinones is clearly uninterested in receiving chemotherapy. She is aware that immunotherapy with an agent other than Jose Angles may be a consideration. However, she is reluctant to do so based on previously experienced side effects of arthralgias and uveitis.   As no active anticancer treatment is planned at this time, I shall plan to see her after her PET/CT in April. All questions were answered to the best of my abilities. Mili Martinez MD  [facility] Hematology and Oncology  [address] [place] [address]  Office : [phone]  Fax : [phone]    Elements of this note have been dictated using speech recognition software. As a result, errors of speech recognition may have occurred.

## 2023-02-28 ENCOUNTER — HOSPITAL ENCOUNTER (OUTPATIENT)
Dept: PHYSICAL THERAPY | Age: 83
Setting detail: RECURRING SERIES
Discharge: HOME | End: 2023-03-03
Payer: MEDICARE

## 2023-02-28 ENCOUNTER — HOSPITAL ENCOUNTER (OUTPATIENT)
Dept: LAB | Age: 83
Discharge: HOME | End: 2023-03-03
Payer: MEDICARE

## 2023-02-28 ENCOUNTER — OFFICE VISIT (OUTPATIENT)
Dept: ONCOLOGY | Age: 83
End: 2023-02-28
Payer: MEDICARE

## 2023-02-28 VITALS
HEART RATE: 77 BPM | BODY MASS INDEX: 22.82 KG/M2 | DIASTOLIC BLOOD PRESSURE: 65 MMHG | RESPIRATION RATE: 18 BRPM | TEMPERATURE: 97.5 F | OXYGEN SATURATION: 95 % | HEIGHT: 65 IN | WEIGHT: 137 LBS | SYSTOLIC BLOOD PRESSURE: 127 MMHG

## 2023-02-28 DIAGNOSIS — C77.3 MALIGNANT NEOPLASM METASTATIC TO LYMPH NODE OF AXILLA (HCC): ICD-10-CM

## 2023-02-28 DIAGNOSIS — C34.91 MALIGNANT NEOPLASM OF RIGHT LUNG, UNSPECIFIED PART OF LUNG (HCC): ICD-10-CM

## 2023-02-28 DIAGNOSIS — C34.91 MALIGNANT NEOPLASM OF RIGHT LUNG, UNSPECIFIED PART OF LUNG (HCC): Primary | ICD-10-CM

## 2023-02-28 LAB
ALBUMIN SERPL-MCNC: 3.6 G/DL (ref 3.2–4.6)
ALBUMIN/GLOB SERPL: 1.1 (ref 0.4–1.6)
ALP SERPL-CCNC: 80 U/L (ref 50–136)
ALT SERPL-CCNC: 23 U/L (ref 12–65)
ANION GAP SERPL CALC-SCNC: 3 MMOL/L (ref 2–11)
AST SERPL-CCNC: 20 U/L (ref 15–37)
BASOPHILS # BLD: 0 K/UL (ref 0–0.2)
BASOPHILS NFR BLD: 0 % (ref 0–2)
BILIRUB SERPL-MCNC: 0.5 MG/DL (ref 0.2–1.1)
BUN SERPL-MCNC: 12 MG/DL (ref 8–23)
CALCIUM SERPL-MCNC: 9.6 MG/DL (ref 8.3–10.4)
CHLORIDE SERPL-SCNC: 107 MMOL/L (ref 101–110)
CO2 SERPL-SCNC: 30 MMOL/L (ref 21–32)
CREAT SERPL-MCNC: 0.6 MG/DL (ref 0.6–1)
DIFFERENTIAL METHOD BLD: NORMAL
EOSINOPHIL # BLD: 0.3 K/UL (ref 0–0.8)
EOSINOPHIL NFR BLD: 5 % (ref 0.5–7.8)
ERYTHROCYTE [DISTWIDTH] IN BLOOD BY AUTOMATED COUNT: 13.9 % (ref 11.9–14.6)
GLOBULIN SER CALC-MCNC: 3.2 G/DL (ref 2.8–4.5)
GLUCOSE SERPL-MCNC: 96 MG/DL (ref 65–100)
HCT VFR BLD AUTO: 39.4 % (ref 35.8–46.3)
HGB BLD-MCNC: 13.3 G/DL (ref 11.7–15.4)
IMM GRANULOCYTES # BLD AUTO: 0 K/UL (ref 0–0.5)
IMM GRANULOCYTES NFR BLD AUTO: 1 % (ref 0–5)
LYMPHOCYTES # BLD: 0.9 K/UL (ref 0.5–4.6)
LYMPHOCYTES NFR BLD: 14 % (ref 13–44)
MCH RBC QN AUTO: 31.9 PG (ref 26.1–32.9)
MCHC RBC AUTO-ENTMCNC: 33.8 G/DL (ref 31.4–35)
MCV RBC AUTO: 94.5 FL (ref 82–102)
MONOCYTES # BLD: 0.5 K/UL (ref 0.1–1.3)
MONOCYTES NFR BLD: 8 % (ref 4–12)
NEUTS SEG # BLD: 4.7 K/UL (ref 1.7–8.2)
NEUTS SEG NFR BLD: 72 % (ref 43–78)
NRBC # BLD: 0 K/UL (ref 0–0.2)
PLATELET # BLD AUTO: 152 K/UL (ref 150–450)
PMV BLD AUTO: 9.9 FL (ref 9.4–12.3)
POTASSIUM SERPL-SCNC: 4.2 MMOL/L (ref 3.5–5.1)
PROT SERPL-MCNC: 6.8 G/DL (ref 6.3–8.2)
RBC # BLD AUTO: 4.17 M/UL (ref 4.05–5.2)
SODIUM SERPL-SCNC: 140 MMOL/L (ref 133–143)
WBC # BLD AUTO: 6.5 K/UL (ref 4.3–11.1)

## 2023-02-28 PROCEDURE — 1090F PRES/ABSN URINE INCON ASSESS: CPT | Performed by: INTERNAL MEDICINE

## 2023-02-28 PROCEDURE — G8484 FLU IMMUNIZE NO ADMIN: HCPCS | Performed by: INTERNAL MEDICINE

## 2023-02-28 PROCEDURE — 80053 COMPREHEN METABOLIC PANEL: CPT

## 2023-02-28 PROCEDURE — 97140 MANUAL THERAPY 1/> REGIONS: CPT

## 2023-02-28 PROCEDURE — G8427 DOCREV CUR MEDS BY ELIG CLIN: HCPCS | Performed by: INTERNAL MEDICINE

## 2023-02-28 PROCEDURE — 36415 COLL VENOUS BLD VENIPUNCTURE: CPT

## 2023-02-28 PROCEDURE — G8420 CALC BMI NORM PARAMETERS: HCPCS | Performed by: INTERNAL MEDICINE

## 2023-02-28 PROCEDURE — 97535 SELF CARE MNGMENT TRAINING: CPT

## 2023-02-28 PROCEDURE — G8400 PT W/DXA NO RESULTS DOC: HCPCS | Performed by: INTERNAL MEDICINE

## 2023-02-28 PROCEDURE — 1123F ACP DISCUSS/DSCN MKR DOCD: CPT | Performed by: INTERNAL MEDICINE

## 2023-02-28 PROCEDURE — 85025 COMPLETE CBC W/AUTO DIFF WBC: CPT

## 2023-02-28 PROCEDURE — 1036F TOBACCO NON-USER: CPT | Performed by: INTERNAL MEDICINE

## 2023-02-28 PROCEDURE — 99214 OFFICE O/P EST MOD 30 MIN: CPT | Performed by: INTERNAL MEDICINE

## 2023-02-28 ASSESSMENT — PATIENT HEALTH QUESTIONNAIRE - PHQ9
SUM OF ALL RESPONSES TO PHQ QUESTIONS 1-9: 0
SUM OF ALL RESPONSES TO PHQ QUESTIONS 1-9: 0
2. FEELING DOWN, DEPRESSED OR HOPELESS: 0
SUM OF ALL RESPONSES TO PHQ QUESTIONS 1-9: 0
1. LITTLE INTEREST OR PLEASURE IN DOING THINGS: 0
SUM OF ALL RESPONSES TO PHQ9 QUESTIONS 1 & 2: 0
SUM OF ALL RESPONSES TO PHQ QUESTIONS 1-9: 0

## 2023-02-28 ASSESSMENT — PAIN SCALES - GENERAL: PAINLEVEL_OUTOF10: 2

## 2023-02-28 NOTE — PATIENT INSTRUCTIONS
Patient Instructions from Today's Visit    Reason for Visit:  New to provider - Lung     Diagnosis Information:  [email or web address]    Plan:  Proceed with PET scan as scheduled in April. You do not want chemotherapy and immunotherapy was discontinue due to toxicity. We will continue to monitor things. Please call us if you have any questions or concerns before your next visit. Follow Up: Follow up after PET scan with Dr Waqar Dunlap, with labs prior.      Recent Lab Results:  Hospital Outpatient Visit on 02/28/2023   Component Date Value Ref Range Status    WBC 02/28/2023 6.5  4.3 - 11.1 K/uL Final    RBC 02/28/2023 4.17  4.05 - 5.2 M/uL Final    Hemoglobin 02/28/2023 13.3  11.7 - 15.4 g/dL Final    Hematocrit 02/28/2023 39.4  35.8 - 46.3 % Final    MCV 02/28/2023 94.5  82.0 - 102.0 FL Final    MCH 02/28/2023 31.9  26.1 - 32.9 PG Final    MCHC 02/28/2023 33.8  31.4 - 35.0 g/dL Final    RDW 02/28/2023 13.9  11.9 - 14.6 % Final    Platelets 80/90/3610 152  150 - 450 K/uL Final    MPV 02/28/2023 9.9  9.4 - 12.3 FL Final    nRBC 02/28/2023 0.00  0.0 - 0.2 K/uL Final    **Note: Absolute NRBC parameter is now reported with Hemogram**    Seg Neutrophils 02/28/2023 72  43 - 78 % Final    Lymphocytes 02/28/2023 14  13 - 44 % Final    Monocytes 02/28/2023 8  4.0 - 12.0 % Final    Eosinophils % 02/28/2023 5  0.5 - 7.8 % Final    Basophils 02/28/2023 0  0.0 - 2.0 % Final    Immature Granulocytes 02/28/2023 1  0.0 - 5.0 % Final    Segs Absolute 02/28/2023 4.7  1.7 - 8.2 K/UL Final    Absolute Lymph # 02/28/2023 0.9  0.5 - 4.6 K/UL Final    Absolute Mono # 02/28/2023 0.5  0.1 - 1.3 K/UL Final    Absolute Eos # 02/28/2023 0.3  0.0 - 0.8 K/UL Final    Basophils Absolute 02/28/2023 0.0  0.0 - 0.2 K/UL Final    Absolute Immature Granulocyte 02/28/2023 0.0  0.0 - 0.5 K/UL Final    Differential Type 02/28/2023 AUTOMATED    Final         Treatment Summary has been discussed and given to patient: N/A        -------------------------------------------------------------------------------------------------------------------  Please call our office at [phone] if you have any  of the following symptoms:   Fever of 100.5 or greater  Chills  Shortness of breath  Swelling or pain in one leg    After office hours an answering service is available and will contact a provider for emergencies or if you are experiencing any of the above symptoms. Patient does express an interest in My Chart. My Chart log in information explained on the after visit summary printout at the Caity Coleman [facility] desk.     Fred Spicer RN

## 2023-02-28 NOTE — PROGRESS NOTES
Janelle Desai  : 1940  Primary: Medicare Part A And B (Medicare)  Secondary: Candy @ [address]  [address] [place] [id number]  Phone: [phone]  Fax: [phone]    OT Visit Info:   Certification Period Expiration Date: 23  Total # of Visits to Date: 3     Visit Count: Visit count could not be calculated. Make sure you are using a visit which is associated with an episode. OUTPATIENT OCCUPATIONAL THERAPY: Treatment Note 2023  Episode: (lymphedema)   Appt Desk      Treatment Diagnosis:  Localized edema (R60.1)  Lymphedema, not elsewhere classified (I89.0)  N64.9, Disorder of the breast, unspecified  Medical/Referring Diagnosis:  No admission diagnoses are documented for this encounter. Referring Physician:  Ramu Gracia MD MD Orders:  OT Eval and Treat     Date of Onset:  Onset Date: 23     Allergies:  Levonorgestrel-ethinyl estrad, Ciprofibrate, Ciprofloxacin, and Penicillins  Restrictions/Precautions:    No data recorded  No data recorded  Medications Last Reviewed:  2023     Interventions Planned: (Treatment may consist of any combination of the following)  No data recorded   Subjective Comments:  \"I had to take off the bandages; it was painful and I couldn't bend my elbow to feed myself or brush my teeth\"     Initial:     2/10 Post Session:     2/10  Medications Last Reviewed:  2023  Updated Objective Findings:  See evaluation note from today  Treatment   THERAPEUTIC EXERCISE: ( minutes): MANUAL THERAPY: (45 minutes):   Manual lymphatic drainage was utilized and necessary because of the patient's  right upper quadrant secondary lymphedema .   Skin care followed by application of Eucerin lotion  Manual Lymph Drainage:   Lymph Nodes:    Cervical Supraclavicular Axillary Abdominal Inguinal Popliteal Antecubital   RIGHT [x]     [x]     [x]     [x]     []     []     []       LEFT [x]     [x]     [x] [x]     [x]     []     []         Anastamoses:   Axillo-axillary Inguino-inguinal Axillo-inguinal Inguino-axillary   ANTERIOR [x]     []     [x]     []       POSTERIOR [x]     []     []     []       RIGHT [x]     []     [x]     []       LEFT []     []     []     []         Limbs:   [x]    RUE     []    LUE     []    RLE    []    LLE    SELF CARE/ADLs  (15 minutes)  R UE compression with Surgigrip bandaging deferred due to discomfort. Measured and ordered R UE small long Farrow armpiece for daily compression management. Referral sent to [facility] for UE/chest compression pump for home use. Treatment/Session Summary:    Treatment Assessment:  Tolerated treatment without complication. Communication/Consultation:  [facility] referral sent for home pump  Equipment provided today:  None today  Recommendations/Intent for next treatment session: Next visit will focus on phase 1 complete decongestive therapy.     Total Treatment Billable Duration:   OT Individual Minutes  Time In: 1300  Time Out: 1400  Minutes: 61    Servando Bobby, OTR/L, CLT-AMIRA    Post Session Pain  Charge Capture   POC Link  Treatment Note Link  MD Guidelines  MyChart    Future Appointments   Date Time Provider Tod Dhillon   3/3/2023 10:00 AM Servando Bobby, OT [facility]   3/7/2023 11:00 AM Servando Bobby, OT [facility]   3/10/2023 11:00 AM Servando Bobby, OT [facility]   3/14/2023 10:00 AM Servando Bobby, OT [facility]   3/17/2023 11:00 AM Servando Bobby, OT [facility]   4/18/2023 11:00 AM [facility] NM PET/CT INJ RM GCCRMPETG [facility]   4/19/2023 10:50 AM [facility] OUTREACH INSURANCE [facility]   4/19/2023 11:30 AM Michelle Allen MD [facility]   5/22/2023 11:40 AM Aldo Mast MD [facility]

## 2023-03-03 ENCOUNTER — HOSPITAL ENCOUNTER (OUTPATIENT)
Dept: PHYSICAL THERAPY | Age: 83
Setting detail: RECURRING SERIES
Discharge: HOME | End: 2023-03-06
Payer: MEDICARE

## 2023-03-03 PROCEDURE — 97140 MANUAL THERAPY 1/> REGIONS: CPT

## 2023-03-03 ASSESSMENT — PAIN SCALES - GENERAL: PAINLEVEL_OUTOF10: 2

## 2023-03-03 NOTE — PROGRESS NOTES
Cesilia Bangura  : 1940  Primary: Medicare Part A And B (Medicare)  Secondary: Candy @ [address]  [address] [place] [id number]  Phone: [phone]  Fax: [phone]    OT Visit Info:   Certification Period Expiration Date: 23  Total # of Visits to Date: 4     Visit Count: Visit count could not be calculated. Make sure you are using a visit which is associated with an episode. OUTPATIENT OCCUPATIONAL THERAPY: Treatment Note 3/3/2023  Episode: (lymphedema)   Appt Desk      Treatment Diagnosis:  Localized edema (R60.1)  Lymphedema, not elsewhere classified (I89.0)  N64.9, Disorder of the breast, unspecified  Medical/Referring Diagnosis:  No admission diagnoses are documented for this encounter. Referring Physician:  Noel Irizarry MD MD Orders:  OT Eval and Treat     Date of Onset:  Onset Date: 23     Allergies:  Levonorgestrel-ethinyl estrad, Ciprofibrate, Ciprofloxacin, and Penicillins  Restrictions/Precautions:    No data recorded  No data recorded  Medications Last Reviewed:  3/3/2023     Interventions Planned: (Treatment may consist of any combination of the following)  No data recorded   Subjective Comments:  \"I don't feel well today; I would like a shorter treatment session\"     Initial:     2/10 Post Session:     2/10  Medications Last Reviewed:  3/3/2023  Updated Objective Findings:  See evaluation note from today  Treatment   THERAPEUTIC EXERCISE: ( minutes): MANUAL THERAPY: (30 minutes):   Manual lymphatic drainage was utilized and necessary because of the patient's  right upper quadrant secondary lymphedema .   Skin care followed by application of Eucerin lotion  Manual Lymph Drainage:   Lymph Nodes:    Cervical Supraclavicular Axillary Abdominal Inguinal Popliteal Antecubital   RIGHT [x]     [x]     [x]     [x]     []     []     []       LEFT [x]     [x]     [x]     [x]     [x]     []     [] Anastamoses:   Axillo-axillary Inguino-inguinal Axillo-inguinal Inguino-axillary   ANTERIOR [x]     []     [x]     []       POSTERIOR [x]     []     []     []       RIGHT [x]     []     [x]     []       LEFT []     []     []     []         Limbs:   [x]    RUE     []    LUE     []    RLE    []    LLE    SELF CARE/ADLs  ( minutes)  R UE compression with Surgigrip. Measured and ordered R UE small long Farrow armpiece for daily compression management. Referral sent to [facility] for UE/chest compression pump for home use. Treatment/Session Summary:    Treatment Assessment:  Tolerated treatment without complication. Communication/Consultation:  [facility] referral sent for home pump  Equipment provided today:  None today  Recommendations/Intent for next treatment session: Next visit will focus on phase 1 complete decongestive therapy.     Total Treatment Billable Duration:   OT Individual Minutes  Time In: 0358  Time Out: 9237  Minutes: 30    Luster Elton, OTR/L, CLT-AMIRA    Post Session Pain  Charge Capture   POC Link  Treatment Note Link  MD Guidelines  MyChart    Future Appointments   Date Time Provider Tod Dhillon   3/7/2023 11:00 AM Luster Elton, OT [facility]   3/10/2023 11:00 AM Luster Elton, OT [facility]   3/14/2023 10:00 AM Luster Elton, OT [facility]   3/17/2023 11:00 AM Luster Elton, OT [facility]   4/18/2023 11:00 AM [facility] NM PET/CT INJ RM [facility]   4/19/2023 10:50 AM [facility]   4/19/2023 11:30 AM Marion Ley MD [facility]   5/22/2023 11:40 AM Delaney Chahal MD [facility]

## 2023-03-07 ENCOUNTER — HOSPITAL ENCOUNTER (OUTPATIENT)
Dept: PHYSICAL THERAPY | Age: 83
Setting detail: RECURRING SERIES
Discharge: HOME | End: 2023-03-10
Payer: MEDICARE

## 2023-03-07 PROCEDURE — 97535 SELF CARE MNGMENT TRAINING: CPT

## 2023-03-07 PROCEDURE — 97140 MANUAL THERAPY 1/> REGIONS: CPT

## 2023-03-07 ASSESSMENT — PAIN SCALES - GENERAL: PAINLEVEL_OUTOF10: 2

## 2023-03-07 NOTE — PROGRESS NOTES
Francheska Dong  : 1940  Primary: Medicare Part A And B (Medicare)  Secondary: Candy @ [address]  [address] [place] [address]  Phone: [phone]  Fax: [phone]    OT Visit Info:   Certification Period Expiration Date: 23  Total # of Visits to Date: 5     Visit Count: Visit count could not be calculated. Make sure you are using a visit which is associated with an episode. OUTPATIENT OCCUPATIONAL THERAPY: Treatment Note 3/7/2023  Episode: (lymphedema)   Appt Desk      Treatment Diagnosis:  Localized edema (R60.1)  Lymphedema, not elsewhere classified (I89.0)  N64.9, Disorder of the breast, unspecified  Medical/Referring Diagnosis:  No admission diagnoses are documented for this encounter. Referring Physician:  Taco Evangelista MD MD Orders:  OT Eval and Treat     Date of Onset:  Onset Date: 23     Allergies:  Levonorgestrel-ethinyl estrad, Ciprofibrate, Ciprofloxacin, and Penicillins  Restrictions/Precautions:    No data recorded  No data recorded  Medications Last Reviewed:  3/7/2023     Interventions Planned: (Treatment may consist of any combination of the following)  Manual therapy, ADLs/self care, Therapeutic exercise, patient education    Subjective Comments:  \"I don't feel well today; I would like a shorter treatment session\"     Initial:     2/10 Post Session:     2/10  Medications Last Reviewed:  3/7/2023  Updated Objective Findings:  See evaluation note from today  Treatment   THERAPEUTIC EXERCISE: ( minutes): MANUAL THERAPY: (45 minutes):   Manual lymphatic drainage was utilized and necessary because of the patient's  right upper quadrant secondary lymphedema .   Skin care followed by application of Eucerin lotion  Manual Lymph Drainage:   Lymph Nodes:    Cervical Supraclavicular Axillary Abdominal Inguinal Popliteal Antecubital   RIGHT [x]     [x]     [x]     [x]     []     []     []       LEFT [x]     [x] [x]     [x]     [x]     []     []         Anastamoses:   Axillo-axillary Inguino-inguinal Axillo-inguinal Inguino-axillary   ANTERIOR [x]     []     [x]     []       POSTERIOR [x]     []     []     []       RIGHT [x]     []     [x]     []       LEFT []     []     []     []         Limbs:   [x]    RUE     []    LUE     []    RLE    []    LLE    SELF CARE/ADLs  ( 15 minutes)  Patient fitted with Farrow arm wrap. Fits well. She will wear daily. Treatment/Session Summary:    Treatment Assessment:  Tolerated treatment without complication. Communication/Consultation:  [employer] referral sent for home pump  Equipment provided today:  Farrow arm piece  Recommendations/Intent for next treatment session: Next visit will focus on phase 1-2  complete decongestive therapy.     Total Treatment Billable Duration:   OT Individual Minutes  Time In: 1100  Time Out: 1200  Minutes: 61    Sol Crook, OTR/L, CLT-AMIRA    Post Session Pain  Charge Capture   POC Link  Treatment Note Link  MD Guidelines  MyChart    Future Appointments   Date Time Provider Tod Dhillon   3/10/2023 11:00 AM Sol Crook OT [facility]   3/17/2023 11:00 AM Sol Crook OT [facility]   3/21/2023 11:00 AM Sol Crook OT [facility]   4/18/2023 11:00 AM [facility] NM PET/CT INJ RM GCCRMPETG [facility]   4/19/2023 10:50 AM [facility] OUTREACH INSURANCE [facility]   4/19/2023 11:30 AM Arnold Billy MD [facility]   5/22/2023 11:40 AM Loretta Gamble MD [facility] AMB

## 2023-03-10 ENCOUNTER — HOSPITAL ENCOUNTER (OUTPATIENT)
Dept: PHYSICAL THERAPY | Age: 83
Setting detail: RECURRING SERIES
Discharge: HOME | End: 2023-03-13
Payer: MEDICARE

## 2023-03-10 PROCEDURE — 97535 SELF CARE MNGMENT TRAINING: CPT

## 2023-03-10 PROCEDURE — 97140 MANUAL THERAPY 1/> REGIONS: CPT

## 2023-03-10 ASSESSMENT — PAIN SCALES - GENERAL: PAINLEVEL_OUTOF10: 2

## 2023-03-10 NOTE — PROGRESS NOTES
Gwendolyn Toro  : 1940  Primary: Medicare Part A And B (Medicare)  Secondary: Candy @ [address]  [address] [place] [id number]  Phone: [phone]  Fax: [phone]    OT Visit Info:   Certification Period Expiration Date: 23  Total # of Visits to Date: 6     Visit Count: Visit count could not be calculated. Make sure you are using a visit which is associated with an episode. OUTPATIENT OCCUPATIONAL THERAPY: Treatment Note 3/10/2023  Episode: (lymphedema)   Appt Desk      Treatment Diagnosis:  Localized edema (R60.1)  Lymphedema, not elsewhere classified (I89.0)  N64.9, Disorder of the breast, unspecified  Medical/Referring Diagnosis:  No admission diagnoses are documented for this encounter. Referring Physician:  Seema Kearney MD MD Orders:  OT Eval and Treat     Date of Onset:  Onset Date: 23     Allergies:  Levonorgestrel-ethinyl estrad, Ciprofibrate, Ciprofloxacin, and Penicillins  Restrictions/Precautions:    No data recorded  No data recorded  Medications Last Reviewed:  3/10/2023     Interventions Planned: (Treatment may consist of any combination of the following)  Manual therapy, ADLs/self care, Therapeutic exercise, patient education    Subjective Comments:  \"I don't feel well today; I would like a shorter treatment session\"     Initial:     2/10 Post Session:     2/10  Medications Last Reviewed:  3/10/2023  Updated Objective Findings: Measured with 12 cm reduction in cumulative circumferential volumetric graph measurements  Treatment   THERAPEUTIC EXERCISE: ( minutes): MANUAL THERAPY: (45 minutes):   Manual lymphatic drainage was utilized and necessary because of the patient's  right upper quadrant secondary lymphedema .   Skin care followed by application of Eucerin lotion  Manual Lymph Drainage:   Lymph Nodes:    Cervical Supraclavicular Axillary Abdominal Inguinal Popliteal Antecubital   RIGHT [x] [x]     [x]     [x]     []     []     []       LEFT [x]     [x]     [x]     [x]     [x]     []     []         Anastamoses:   Axillo-axillary Inguino-inguinal Axillo-inguinal Inguino-axillary   ANTERIOR [x]     []     [x]     []       POSTERIOR [x]     []     []     []       RIGHT [x]     []     [x]     []       LEFT []     []     []     []         Limbs:   [x]    RUE     []    LUE     []    RLE    []    LLE    SELF CARE/ADLs  ( 10 minutes)  Applied Surgigrip sleeve to alternate with Hurshel Golder wrap     Treatment/Session Summary:    Treatment Assessment:  Tolerated treatment without complication. Communication/Consultation:  [employer] referral sent for home pump  Equipment provided today:  Surgigrip  Recommendations/Intent for next treatment session: Next visit will focus on phase 1-2  complete decongestive therapy.     Total Treatment Billable Duration:   OT Individual Minutes  Time In: 6371  Time Out: 1200  Minutes: 54    Terryl Handsome, OTR/L, CLT-AMIRA    Post Session Pain  Charge Capture   POC Link  Treatment Note Link  MD Guidelines  MyChart    Future Appointments   Date Time Provider Tod Dhillon   3/17/2023 11:00 AM Terryl Handstom, OT [facility]   3/21/2023 11:00 AM Magalieyl Handstom, OT [facility]   4/18/2023 11:00 AM [facility] [address] [facility] NM PET/CT INJ RM GCCRMPETG [facility] [address] [facility]   4/19/2023 10:50 AM [facility] [address] [facility]   4/19/2023 11:30 AM Fran Duran MD [facility]   5/22/2023 11:40 AM Dana Li MD [facility] AMB

## 2023-03-14 ENCOUNTER — APPOINTMENT (OUTPATIENT)
Dept: PHYSICAL THERAPY | Age: 83
End: 2023-03-14
Payer: MEDICARE

## 2023-03-17 ENCOUNTER — HOSPITAL ENCOUNTER (OUTPATIENT)
Dept: PHYSICAL THERAPY | Age: 83
Setting detail: RECURRING SERIES
Discharge: HOME | End: 2023-03-20
Payer: MEDICARE

## 2023-03-17 PROCEDURE — 97140 MANUAL THERAPY 1/> REGIONS: CPT

## 2023-03-17 ASSESSMENT — PAIN SCALES - GENERAL: PAINLEVEL_OUTOF10: 2

## 2023-03-17 NOTE — PROGRESS NOTES
[x]     [x]     [x]     []     []     []       LEFT [x]     [x]     [x]     [x]     [x]     []     []         Anastamoses:   Axillo-axillary Inguino-inguinal Axillo-inguinal Inguino-axillary   ANTERIOR [x]     []     [x]     []       POSTERIOR [x]     []     []     []       RIGHT [x]     []     [x]     []       LEFT []     []     []     []         Limbs:   [x]    RUE     []    LUE     []    RLE    []    LLE    SELF CARE/ADLs  (  minutes)  Applied Surgigrip sleeve to alternate with Luna Rede wrap     Treatment/Session Summary:    Treatment Assessment:  Tolerated treatment without complication. Communication/Consultation:  [employer] referral sent for home pump  Equipment provided today:  Surgigrip  Recommendations/Intent for next treatment session: Next visit will focus on phase 1-2  complete decongestive therapy.     Total Treatment Billable Duration:   OT Individual Minutes  Time In: 1100  Time Out: 1200  Minutes: 61    Larissa Cruz OTR/L, CLT-AMIRA    Post Session Pain  Charge Capture   POC Link  Treatment Note Link  MD Guidelines  MyChart    Future Appointments   Date Time Provider Tod Jacquie   3/21/2023 11:00 AM Larissa Cruz OT [facility]   4/18/2023 11:00 AM [facility] NM PET/CT INJ RM GCCRMPETG [facility]   4/19/2023 10:50 AM [facility] OUTREACH INSURANCE [facility]   4/19/2023 11:30 AM Sarah Moran MD [facility]   5/22/2023 11:40 AM Mis Perez MD [facility] AMB

## 2023-03-21 ENCOUNTER — HOSPITAL ENCOUNTER (OUTPATIENT)
Dept: PHYSICAL THERAPY | Age: 83
Setting detail: RECURRING SERIES
Discharge: HOME | End: 2023-03-24
Payer: MEDICARE

## 2023-03-21 PROCEDURE — 97140 MANUAL THERAPY 1/> REGIONS: CPT

## 2023-03-21 ASSESSMENT — PAIN SCALES - GENERAL: PAINLEVEL_OUTOF10: 2

## 2023-03-21 NOTE — THERAPY DISCHARGE
[x]      [] 25 22       6 inches   [x]      [] 24 22       3 inches   [x]      [] 21 19.5       Wrist   [x]      [] 17.5 15       Palm   [x]      [] 18 17     Measurements are taken in centimeters:  2.54 cm = 1 inch  Cumulative circumferential volumetric graph measurements  RUE limb size   160 cm 147.5 cm            ASSESSMENT   DISCHARGE SUMMARY:  Ms. Kristy Tucker completed 8 lymphedema therapy treatment sessions from 2/21/23 through 3/21/23. Pt was educated on lymphatic pathophysiology, complete decongestive therapy, precautions and skin integrity management. She received manual lymphatic drainage, compression bandaging, exercises, patient education, skin care. She transitioned to a Farrow arm piece, alternating with Surgrip sleeve, for daily compression management. She demonstrated reduction and stabilization of 12.5 cm. She is independent with self management. She will be receiving a [employer] compression pump for home use as she requires daily use of pump to maintain reduction and stabilization, along with exercise, compression, elevation. Goals achieved. Patient discharged today. Initial Assessment:  Ms. Kristy Tucker presents with R upper extremity, right breast, and right axillary upper quadrant edema. She was diagnosed with lung cancer in September 2020. She received chemo and immunotherapy, ultimately going into remission. In January of 2023, she was diagnosed with cancer of the axillary lymph nodes. She received radiation x 11 treatments and then stopped the treatment due to development of lymphedema in the right upper quadrant. She is here to manage lymphedema  Problem List (Impacting functional limitations):    Decreased activity tolerance     Edema/girth    Decreased skin integrity/hygiene    Limb heaviness     Therapy Prognosis:   Prognosis: Good     Assessment Complexity:      PLAN   Effective Dates: 1/09/24  TO Certification Period Expiration Date: 05/22/23   .    Frequency/Duration: Twice

## 2023-03-21 NOTE — PROGRESS NOTES
[x]     [x]     []     []     []       LEFT [x]     [x]     [x]     [x]     [x]     []     []         Anastamoses:   Axillo-axillary Inguino-inguinal Axillo-inguinal Inguino-axillary   ANTERIOR [x]     []     [x]     []       POSTERIOR [x]     []     []     []       RIGHT [x]     []     [x]     []       LEFT []     []     []     []         Limbs:   [x]    RUE     []    LUE     []    RLE    []    LLE    SELF CARE/ADLs  (  minutes)  Applied Surgigrip sleeve to alternate with Therisa Kells wrap     Treatment/Session Summary:    Treatment Assessment:  Tolerated treatment without complication. Reviewed home program with MLD and compression management using Surgigrip or Farrow wrap.   Goals achieved     Communication/Consultation:  [employer] referral in process  Equipment provided today:  Surgigrip  Recommendations/Intent for next treatment session: Discharge today    Total Treatment Billable Duration:   OT Individual Minutes  Time In: 1110  Time Out: [address] [facility]  Minutes: [address], OTR/L, CLT-AMIRA    Post Session Pain  Charge Capture   POC Link  Treatment Note Link  MD Guidelines  MyChart    Future Appointments   Date Time Provider Tod Jacquie   4/18/2023 11:00 AM [facility] NM PET/CT INJ RM [address] [facility]   4/19/2023 10:50 AM Teresa Fragoso [facility]   4/19/2023 11:30 AM Yordan Echavarria MD [facility]   5/22/2023 11:40 AM Justina Marcelino MD [facility] G[facility] AMB

## 2023-04-18 ENCOUNTER — HOSPITAL ENCOUNTER (OUTPATIENT)
Dept: PET IMAGING | Age: 83
Discharge: HOME | End: 2023-04-21
Payer: MEDICARE

## 2023-04-18 DIAGNOSIS — C77.3 MALIGNANT NEOPLASM METASTATIC TO LYMPH NODE OF AXILLA (HCC): ICD-10-CM

## 2023-04-18 LAB
GLUCOSE BLD STRIP.AUTO-MCNC: 93 MG/DL (ref 65–100)
SERVICE CMNT-IMP: NORMAL

## 2023-04-18 PROCEDURE — 6360000004 HC RX CONTRAST MEDICATION: Performed by: INTERNAL MEDICINE

## 2023-04-18 PROCEDURE — 78815 PET IMAGE W/CT SKULL-THIGH: CPT

## 2023-04-18 PROCEDURE — 2580000003 HC RX 258: Performed by: INTERNAL MEDICINE

## 2023-04-18 PROCEDURE — 3430000000 HC RX DIAGNOSTIC RADIOPHARMACEUTICAL: Performed by: INTERNAL MEDICINE

## 2023-04-18 PROCEDURE — 82962 GLUCOSE BLOOD TEST: CPT

## 2023-04-18 PROCEDURE — A9552 F18 FDG: HCPCS | Performed by: INTERNAL MEDICINE

## 2023-04-18 RX ORDER — SODIUM CHLORIDE 0.9 % (FLUSH) 0.9 %
10 SYRINGE (ML) INJECTION AS NEEDED
Status: DISCONTINUED | OUTPATIENT
Start: 2023-04-18 | End: 2023-04-22 | Stop reason: HOSPADM

## 2023-04-18 RX ORDER — FLUDEOXYGLUCOSE F 18 200 MCI/ML
11.48 INJECTION, SOLUTION INTRAVENOUS
Status: COMPLETED | OUTPATIENT
Start: 2023-04-18 | End: 2023-04-18

## 2023-04-18 RX ADMIN — DIATRIZOATE MEGLUMINE AND DIATRIZOATE SODIUM 10 ML: 660; 100 LIQUID ORAL; RECTAL at 11:00

## 2023-04-18 RX ADMIN — FLUDEOXYGLUCOSE F 18 11.48 MILLICURIE: 200 INJECTION, SOLUTION INTRAVENOUS at 11:00

## 2023-04-18 RX ADMIN — SODIUM CHLORIDE, PRESERVATIVE FREE 10 ML: 5 INJECTION INTRAVENOUS at 11:00

## 2023-04-18 NOTE — PROGRESS NOTES
non-FDA approved and may incur significant cost to the patient). Patient declined any cancer directed treatment at this time. Further scans will therefore not be ordered. Discussed the option of comfort focused/hospice care. Patient is not ready for that yet. Palliative care consultation will be sought. Reviewed indications to seek emergency medical care. ROV as scheduled or sooner if needed. Adela Skelton MD  [facility] Hematology and Oncology  [address] [place] [address]  Office : [phone]  Fax : [phone]    Elements of this note have been dictated using speech recognition software. As a result, errors of speech recognition may have occurred.

## 2023-04-19 ENCOUNTER — OFFICE VISIT (OUTPATIENT)
Dept: ONCOLOGY | Age: 83
End: 2023-04-19
Payer: MEDICARE

## 2023-04-19 ENCOUNTER — HOSPITAL ENCOUNTER (OUTPATIENT)
Dept: LAB | Age: 83
Discharge: HOME | End: 2023-04-22
Payer: MEDICARE

## 2023-04-19 VITALS
BODY MASS INDEX: 22.66 KG/M2 | HEART RATE: 74 BPM | DIASTOLIC BLOOD PRESSURE: 59 MMHG | HEIGHT: 65 IN | RESPIRATION RATE: 18 BRPM | OXYGEN SATURATION: 96 % | TEMPERATURE: 97.7 F | SYSTOLIC BLOOD PRESSURE: 125 MMHG | WEIGHT: 136 LBS

## 2023-04-19 DIAGNOSIS — C34.91 MALIGNANT NEOPLASM OF RIGHT LUNG, UNSPECIFIED PART OF LUNG (HCC): ICD-10-CM

## 2023-04-19 DIAGNOSIS — C77.3 MALIGNANT NEOPLASM METASTATIC TO LYMPH NODE OF AXILLA (HCC): ICD-10-CM

## 2023-04-19 DIAGNOSIS — C34.91 MALIGNANT NEOPLASM OF RIGHT LUNG, UNSPECIFIED PART OF LUNG (HCC): Primary | ICD-10-CM

## 2023-04-19 LAB
ALBUMIN SERPL-MCNC: 3.5 G/DL (ref 3.2–4.6)
ALBUMIN/GLOB SERPL: 1.3 (ref 0.4–1.6)
ALP SERPL-CCNC: 87 U/L (ref 50–136)
ALT SERPL-CCNC: 23 U/L (ref 12–65)
ANION GAP SERPL CALC-SCNC: 3 MMOL/L (ref 2–11)
AST SERPL-CCNC: 20 U/L (ref 15–37)
BASOPHILS # BLD: 0 K/UL (ref 0–0.2)
BASOPHILS NFR BLD: 0 % (ref 0–2)
BILIRUB SERPL-MCNC: 0.6 MG/DL (ref 0.2–1.1)
BUN SERPL-MCNC: 9 MG/DL (ref 8–23)
CALCIUM SERPL-MCNC: 9.2 MG/DL (ref 8.3–10.4)
CHLORIDE SERPL-SCNC: 108 MMOL/L (ref 101–110)
CO2 SERPL-SCNC: 30 MMOL/L (ref 21–32)
CREAT SERPL-MCNC: 0.6 MG/DL (ref 0.6–1)
DIFFERENTIAL METHOD BLD: ABNORMAL
EOSINOPHIL # BLD: 0.2 K/UL (ref 0–0.8)
EOSINOPHIL NFR BLD: 4 % (ref 0.5–7.8)
ERYTHROCYTE [DISTWIDTH] IN BLOOD BY AUTOMATED COUNT: 13.9 % (ref 11.9–14.6)
GLOBULIN SER CALC-MCNC: 2.7 G/DL (ref 2.8–4.5)
GLUCOSE SERPL-MCNC: 88 MG/DL (ref 65–100)
HCT VFR BLD AUTO: 38.8 % (ref 35.8–46.3)
HGB BLD-MCNC: 12.8 G/DL (ref 11.7–15.4)
IMM GRANULOCYTES # BLD AUTO: 0 K/UL (ref 0–0.5)
IMM GRANULOCYTES NFR BLD AUTO: 0 % (ref 0–5)
LYMPHOCYTES # BLD: 0.8 K/UL (ref 0.5–4.6)
LYMPHOCYTES NFR BLD: 12 % (ref 13–44)
MCH RBC QN AUTO: 31.6 PG (ref 26.1–32.9)
MCHC RBC AUTO-ENTMCNC: 33 G/DL (ref 31.4–35)
MCV RBC AUTO: 95.8 FL (ref 82–102)
MONOCYTES # BLD: 0.5 K/UL (ref 0.1–1.3)
MONOCYTES NFR BLD: 8 % (ref 4–12)
NEUTS SEG # BLD: 4.6 K/UL (ref 1.7–8.2)
NEUTS SEG NFR BLD: 75 % (ref 43–78)
NRBC # BLD: 0 K/UL (ref 0–0.2)
PLATELET # BLD AUTO: 181 K/UL (ref 150–450)
PMV BLD AUTO: 9.6 FL (ref 9.4–12.3)
POTASSIUM SERPL-SCNC: 4.1 MMOL/L (ref 3.5–5.1)
PROT SERPL-MCNC: 6.2 G/DL (ref 6.3–8.2)
RBC # BLD AUTO: 4.05 M/UL (ref 4.05–5.2)
SODIUM SERPL-SCNC: 141 MMOL/L (ref 133–143)
WBC # BLD AUTO: 6.2 K/UL (ref 4.3–11.1)

## 2023-04-19 PROCEDURE — 80053 COMPREHEN METABOLIC PANEL: CPT

## 2023-04-19 PROCEDURE — 36415 COLL VENOUS BLD VENIPUNCTURE: CPT

## 2023-04-19 PROCEDURE — 1090F PRES/ABSN URINE INCON ASSESS: CPT | Performed by: INTERNAL MEDICINE

## 2023-04-19 PROCEDURE — G8427 DOCREV CUR MEDS BY ELIG CLIN: HCPCS | Performed by: INTERNAL MEDICINE

## 2023-04-19 PROCEDURE — 1036F TOBACCO NON-USER: CPT | Performed by: INTERNAL MEDICINE

## 2023-04-19 PROCEDURE — G8420 CALC BMI NORM PARAMETERS: HCPCS | Performed by: INTERNAL MEDICINE

## 2023-04-19 PROCEDURE — G8400 PT W/DXA NO RESULTS DOC: HCPCS | Performed by: INTERNAL MEDICINE

## 2023-04-19 PROCEDURE — 1123F ACP DISCUSS/DSCN MKR DOCD: CPT | Performed by: INTERNAL MEDICINE

## 2023-04-19 PROCEDURE — 99214 OFFICE O/P EST MOD 30 MIN: CPT | Performed by: INTERNAL MEDICINE

## 2023-04-19 PROCEDURE — 85025 COMPLETE CBC W/AUTO DIFF WBC: CPT

## 2023-04-19 ASSESSMENT — PATIENT HEALTH QUESTIONNAIRE - PHQ9
2. FEELING DOWN, DEPRESSED OR HOPELESS: 1
1. LITTLE INTEREST OR PLEASURE IN DOING THINGS: 0
SUM OF ALL RESPONSES TO PHQ QUESTIONS 1-9: 1
SUM OF ALL RESPONSES TO PHQ QUESTIONS 1-9: 1
SUM OF ALL RESPONSES TO PHQ9 QUESTIONS 1 & 2: 1
SUM OF ALL RESPONSES TO PHQ QUESTIONS 1-9: 1
SUM OF ALL RESPONSES TO PHQ QUESTIONS 1-9: 1

## 2023-04-19 NOTE — PATIENT INSTRUCTIONS
Patient Instructions from Today's Visit    Reason for Visit:  Follow up - Lung    Diagnosis Information:  [email or web address]    Plan:  PET scan reviewed. Should you experience any shortness of breath, our recommendation would be that you go to the ER  We will repeat a scan in 3 months. Discussed hospice - if we are not doing any treatment,transferring over to hospice care would be appropriate. Please let us know if you would like to meet with them to discuss their services. Referral to palliative care. Try using hydrocortisone cream on the rash that you have developed from radiation treatment. Please call us if you have any questions or concerns before your next visit. Follow Up: Follow up in 3 months with Dr. Cisse, with labs prior.      Recent Lab Results:  Hospital Outpatient Visit on 04/19/2023   Component Date Value Ref Range Status    WBC 04/19/2023 6.2  4.3 - 11.1 K/uL Final    RBC 04/19/2023 4.05  4.05 - 5.2 M/uL Final    Hemoglobin 04/19/2023 12.8  11.7 - 15.4 g/dL Final    Hematocrit 04/19/2023 38.8  35.8 - 46.3 % Final    MCV 04/19/2023 95.8  82.0 - 102.0 FL Final    MCH 04/19/2023 31.6  26.1 - 32.9 PG Final    MCHC 04/19/2023 33.0  31.4 - 35.0 g/dL Final    RDW 04/19/2023 13.9  11.9 - 14.6 % Final    Platelets 93/72/7030 181  150 - 450 K/uL Final    MPV 04/19/2023 9.6  9.4 - 12.3 FL Final    nRBC 04/19/2023 0.00  0.0 - 0.2 K/uL Final    **Note: Absolute NRBC parameter is now reported with Hemogram**    Differential Type 04/19/2023 AUTOMATED    Final    Seg Neutrophils 04/19/2023 75  43 - 78 % Final    Lymphocytes 04/19/2023 12 (L)  13 - 44 % Final    Monocytes 04/19/2023 8  4.0 - 12.0 % Final    Eosinophils % 04/19/2023 4  0.5 - 7.8 % Final    Basophils 04/19/2023 0  0.0 - 2.0 % Final    Immature Granulocytes 04/19/2023 0  0.0 - 5.0 % Final    Segs Absolute 04/19/2023 4.6  1.7 - 8.2 K/UL Final    Absolute Lymph #

## 2023-04-28 ENCOUNTER — OFFICE VISIT (OUTPATIENT)
Dept: PALLATIVE CARE | Age: 83
End: 2023-04-28

## 2023-04-28 VITALS
DIASTOLIC BLOOD PRESSURE: 66 MMHG | HEIGHT: 65 IN | WEIGHT: 138.4 LBS | RESPIRATION RATE: 14 BRPM | BODY MASS INDEX: 23.06 KG/M2 | HEART RATE: 78 BPM | TEMPERATURE: 98.8 F | SYSTOLIC BLOOD PRESSURE: 122 MMHG | OXYGEN SATURATION: 96 %

## 2023-04-28 DIAGNOSIS — C34.81 MALIGNANT NEOPLASM OF OVERLAPPING SITES OF RIGHT LUNG (HCC): ICD-10-CM

## 2023-04-28 DIAGNOSIS — Z51.5 ENCOUNTER FOR PALLIATIVE CARE: ICD-10-CM

## 2023-04-28 DIAGNOSIS — M79.2 NEUROPATHIC PAIN DUE TO RADIATION: Primary | ICD-10-CM

## 2023-04-28 DIAGNOSIS — R53.0 NEOPLASTIC MALIGNANT RELATED FATIGUE: ICD-10-CM

## 2023-04-28 DIAGNOSIS — Z71.89 ADVANCE CARE PLANNING: ICD-10-CM

## 2023-04-28 ASSESSMENT — PATIENT HEALTH QUESTIONNAIRE - PHQ9
2. FEELING DOWN, DEPRESSED OR HOPELESS: 0
SUM OF ALL RESPONSES TO PHQ QUESTIONS 1-9: 0
SUM OF ALL RESPONSES TO PHQ QUESTIONS 1-9: 0
1. LITTLE INTEREST OR PLEASURE IN DOING THINGS: 0
SUM OF ALL RESPONSES TO PHQ QUESTIONS 1-9: 0
SUM OF ALL RESPONSES TO PHQ9 QUESTIONS 1 & 2: 0
SUM OF ALL RESPONSES TO PHQ QUESTIONS 1-9: 0

## 2023-04-28 ASSESSMENT — ENCOUNTER SYMPTOMS: SHORTNESS OF BREATH: 0

## 2023-04-28 NOTE — PROGRESS NOTES
Outpatient Palliative Care at the  [facility], [place]: Office Visit New Patient H & P    Diagnosis: NSCLC    Treatment Plan: Keytruda/Taxol/Carbo--> Keytruda--> surveillance--> XRT to right axilla    Treatment Intent: Palliative    Medical Oncologist: Dr. Bridgette Lazo    Radiation Oncologist: Dr. Bettye Ahumada    Navigator: N/A      Chief Complaint:    Chief Complaint   Patient presents with    New Patient       History of Present Illness:  Ms. Ana French is a 81 y.o. female who presents today for evaluation regarding introduction to palliative care in the setting of metastatic NSCLC. Patient initially presented in the fall of 2020 with persistent cough and fatigue. After no improvement with bronchitis treatment, she presented to ER and had hypoxia. CT showed collapse of RLL and majority of RML with mass effect on mediastinum. A thoracentesis (4200cc over two days) confirmed adenocarcinoma. PET showed no definitive source of pulmonary malignancy, but showed right pleural effusion with pleural nodularity. She began Taxol/Carbo/Keytruda in December 2020, then completed Keytruda in September 2021. Jeral Furnish was stopped for severe arthralgias. In the fall of 2022, she developed right axillary swelling and bx confirmed lung adenocarcinoma. PET again with no source of malignancy other than right axilla. She had palliative radiation to right axilla in January 2023. A PET in April 2023 showed improvement in right axilla, but new disease in lung and mediastinum. She is not interested in cancer directed therapy. She has PMH of tobacco use (quit 1986), GERD, IBS, insomnia, glaucoma. Patient is . Her son and daughter both live in the [place]. Patient seen in clinic for initial palliative care visit. She comes to visit alone today. Patient has ongoing pain to her right breast, right axilla, and right arm. There is redness associated. Her breast is inflamed and heavy.   She describes the pain has burning in nature

## 2023-04-28 NOTE — ACP (ADVANCE CARE PLANNING)
Introduced advance care planning as part of the palliative care role. Patient has a HCPOA, which is scanned into EMR. This names her daughter Jennifer Rudolph as her agent, and her son Wendy Zuñiga" as the first alternate agent. Regarding heroic interventions, patient does not desire any trial of mechanical ventilation nor CPR. We reviewed all medical interventions that may still be pursued with a DNR order in place. She verbalizes understanding and desire to proceed with DNR DHEC order and order in her EMR. These were completed. Time also spent talking about the philosophy of palliative care, and the timing of when to engage hospice services. We will plan for a palliative care follow-up visit here in July, but patient is aware to call the [facility] or send a message for any palliative care needs.   If she declines, or desires for care to be provided in her home, will facilitate a home-based palliative care or hospice referral.

## 2023-05-08 NOTE — PROGRESS NOTES
OSWESTRY DISABILITY QUESTIONNAIRE  This questionnaire has been designed to give us information as to how your back or leg pain is affecting your ability to manage in everyday life. Please answer by checking one box in each section for the statement which best applies to you. We realize you may consider that tow or more statements in any one section apply but please just check the box that indicated the statement that most clearly describes your problem.       Section 1: Pain Intensity    [] 0. I have no pain at the moment    [] 1. The pain is very mild at the moment     [] 2. The pain is moderate at the moment     [] 3. The pain is fairly severe at the moment     [x] 4. The pain is very severe at the moment    [] 5. The pain is the worst imaginable at the moment  Section 6: Standing     [] 0. I can stand as long as I want without extra pain    [] 1. I can stand as long as I want but it gives me extra pain    [] 2. Pain prevents me from standing for more than 1 hour     [] 3. Pain prevents me from standing for more than 30    minutes      [x] 4. Pain prevents me from standing for more than 10   minutes     [] 5. Pain prevents me from standing at all        Section 2: Personal Care    [] 0. I can look after myself normally without causing extra pain  [] 1. I can look after myself normally but causes extra pain    [] 2. It is painful to look after myself and I am slow and careful     [] 3. I need some help but can manage most of my     personal care     [x] 4. I need help every day in most aspects of self-care     [] 5. I do not get dressed, wash with difficulty and stay in bed      Section 7: Sleeping    [] 0. My sleep is never disturbed by     [] 1. My sleep is occasionally disturbed by pain    [] 2. Because of pain I have less than 6 hours of sleep     [x] 3. Because of pain I have less than 4 hours of sleep     [] 4. Because of pain I have less than 2 hours of sleep    [] 5. Pain prevents me from sleeping at  Arrived to the [facility]. B12 injection completed. Provided education on B12    Patient instructed to report any side affects to ordering provider. Patient tolerated without problems. Any issues or concerns during appointment: no.  Patient aware of next infusion appointment on 12/17/20 (date) at 36 (time). Discharged ambulatory with family. all.      Section 3: Lifting      [] 0. I can lift heavy weights without extra pain    [] 1. I can lift heavy weights but it gives me extra pain     [] 2. Pain prevents me lifting heavy weights off the floor, but I can manage if they are conveniently placed. (e.g.On a table)    [] 3. Pain prevents me lifting heavy weights but I can   manage light to medium weights if they are                  conveniently positioned     [] 4. I can only lift very light weights     [x] 5. I cannot lift or nguyễn anything     Section 8: Sex Life (if applicable)    [] 0. My sex life is normaly and causes no extra pain     [] 1. My sex life is normal but causes some extra pain    [] 2. My sex life is nearly normal but is very painful     [] 3. My sex life is severely restricted by pain    [] 4. My sex life is nearly absent because of pain    [] 5. Pain prevents any sex life at all     Section 4: Walking    [] 0. Pain does not prevent me walking any distance     [] 1. Pain prevents me from walking more than 1.2 miles     [] 2. Pain prevents me from waking more than 0.6 miles     [x] 3. Pain prevents me from walking more than 0.3 miles     [] 4. I can only walk using a stick or crutches     [] 5. I am in bed most of the time   Section 9: Social Life     [] 0. My social life is normal and gives me no extra pain    [] 1. My social life is normal but increases the degree of pain    [] 2. Pain has no significant effect on my social life apart  from limiting my more energetic interests                          (e.g.Sports)    [x] 3. Pain has restricted my social life and I do not go out as often.    [] 4. Pain has restricted my social life to my home.    [] 5. I have no social life because of pain       Section 5: Sitting     [] 0. I can sit in any chair as long as I like     [] 1. I can only sit in my favorite chair as long as I like     [] 2. Pain prevents me from sitting more than one hour     [] 3. Pain prevents me from sitting more than 30  minutes     [x] 4. Pain prevents me from sitting more than 10 minutes     [] 5. Pain prevents me from sitting at all    Score Index 67%.    Section 10: Traveling    [x] 0. I can travel anywhere without pain    [] 1. I can travel anywhere but it gives me extra pain    [] 2. Pain is bad but I manage journeys over 2 hours     [] 3. Pain restricts me to journeys less than 1 hour    [] 4. Pain restricts me to short necessary journeys less than 20 minutes    [] 5. Pain prevents me from travelling except to receive treatment.

## 2023-05-11 ENCOUNTER — OFFICE VISIT (OUTPATIENT)
Dept: INTERNAL MEDICINE CLINIC | Facility: CLINIC | Age: 83
End: 2023-05-11
Payer: MEDICARE

## 2023-05-11 ENCOUNTER — TELEPHONE (OUTPATIENT)
Dept: INTERNAL MEDICINE CLINIC | Facility: CLINIC | Age: 83
End: 2023-05-11

## 2023-05-11 VITALS
DIASTOLIC BLOOD PRESSURE: 74 MMHG | SYSTOLIC BLOOD PRESSURE: 130 MMHG | HEIGHT: 65 IN | WEIGHT: 140 LBS | OXYGEN SATURATION: 96 % | TEMPERATURE: 98.6 F | HEART RATE: 92 BPM | BODY MASS INDEX: 23.32 KG/M2

## 2023-05-11 DIAGNOSIS — C34.81 MALIGNANT NEOPLASM OF OVERLAPPING SITES OF RIGHT LUNG (HCC): Primary | ICD-10-CM

## 2023-05-11 DIAGNOSIS — D89.89 OTHER SPECIFIED DISORDERS INVOLVING THE IMMUNE MECHANISM, NOT ELSEWHERE CLASSIFIED (HCC): ICD-10-CM

## 2023-05-11 DIAGNOSIS — M35.01 SJOGREN SYNDROME WITH KERATOCONJUNCTIVITIS (HCC): ICD-10-CM

## 2023-05-11 DIAGNOSIS — E88.89 OTHER SPECIFIED METABOLIC DISORDERS (HCC): ICD-10-CM

## 2023-05-11 DIAGNOSIS — C78.2 METASTASIS TO PLEURA (HCC): ICD-10-CM

## 2023-05-11 DIAGNOSIS — I50.22 CHRONIC SYSTOLIC (CONGESTIVE) HEART FAILURE (HCC): ICD-10-CM

## 2023-05-11 PROCEDURE — G8420 CALC BMI NORM PARAMETERS: HCPCS | Performed by: INTERNAL MEDICINE

## 2023-05-11 PROCEDURE — 1090F PRES/ABSN URINE INCON ASSESS: CPT | Performed by: INTERNAL MEDICINE

## 2023-05-11 PROCEDURE — 99213 OFFICE O/P EST LOW 20 MIN: CPT | Performed by: INTERNAL MEDICINE

## 2023-05-11 PROCEDURE — G8400 PT W/DXA NO RESULTS DOC: HCPCS | Performed by: INTERNAL MEDICINE

## 2023-05-11 PROCEDURE — G8427 DOCREV CUR MEDS BY ELIG CLIN: HCPCS | Performed by: INTERNAL MEDICINE

## 2023-05-11 PROCEDURE — 1036F TOBACCO NON-USER: CPT | Performed by: INTERNAL MEDICINE

## 2023-05-11 PROCEDURE — 1123F ACP DISCUSS/DSCN MKR DOCD: CPT | Performed by: INTERNAL MEDICINE

## 2023-05-11 RX ORDER — GABAPENTIN 100 MG/1
100 CAPSULE ORAL 3 TIMES DAILY
Qty: 90 CAPSULE | Refills: 0 | Status: SHIPPED | OUTPATIENT
Start: 2023-05-11 | End: 2023-06-10

## 2023-05-11 RX ORDER — VALACYCLOVIR HCL 1000 MG
1000 TABLET ORAL 2 TIMES DAILY
Qty: 28 TABLET | Refills: 0 | Status: SHIPPED | OUTPATIENT
Start: 2023-05-11 | End: 2023-05-25

## 2023-05-11 RX ORDER — LIDOCAINE 50 MG/G
OINTMENT TOPICAL
Qty: 50 G | Refills: 1 | Status: SHIPPED | OUTPATIENT
Start: 2023-05-11

## 2023-05-11 RX ORDER — HYDROCODONE BITARTRATE AND ACETAMINOPHEN 5; 325 MG/1; MG/1
1 TABLET ORAL EVERY 4 HOURS PRN
Qty: 30 TABLET | Refills: 0 | Status: SHIPPED | OUTPATIENT
Start: 2023-05-11 | End: 2023-05-16

## 2023-05-11 ASSESSMENT — ENCOUNTER SYMPTOMS
COUGH: 1
ABDOMINAL PAIN: 0
WHEEZING: 0
SHORTNESS OF BREATH: 0

## 2023-05-11 NOTE — PROGRESS NOTES
node.    Specimen: Sent to pathology    Plan: The patient is discharged to home in good and stable condition. 02/06/23    VAS DUP UPPER EXTREMITY VENOUS RIGHT 02/06/2023 12:38 PM (Final)    Narrative  TITLE: Upper extremity venous ultrasound examination. INDICATION: Right extremity pain and swelling. TECHNIQUE: Grayscale, Color, and Spectral Doppler interrogation performed. COMPARISON: None. FINDINGS: Normal compressibility and flow waveform of the right upper extremity  veins including forearm veins cephalic vein, basilic vein, axillary, subclavian  and innominate vein. Right internal jugular vein is compressible and patent    Impression  No ultrasound evidence of right upper extremity venous thrombosis.   Prominent lymph nodes seen in the right axillary space not significantly changed  compared with PET/CT scan December 6, 2022    Signed by: Francesco Tuttle MD on 2/6/2023 12:38 PM      Vitals:    05/11/23 0800   BP: 130/74   Site: Left Upper Arm   Position: Sitting   Cuff Size: Small Adult   Pulse: 92   Temp: 98.6 °F (37 °C)   TempSrc: Oral   SpO2: 96%   Weight: 140 lb (63.5 kg)   Height: 5' 5\" (1.651 m)     Wt Readings from Last 3 Encounters:   05/11/23 140 lb (63.5 kg)   04/28/23 138 lb 6.4 oz (62.8 kg)   04/19/23 136 lb (61.7 kg)     BP Readings from Last 3 Encounters:   05/11/23 130/74   04/28/23 122/66   04/19/23 (!) 125/59     Physical Exam

## 2023-05-19 ENCOUNTER — TELEPHONE (OUTPATIENT)
Dept: INTERNAL MEDICINE CLINIC | Facility: CLINIC | Age: 83
End: 2023-05-19

## 2023-05-19 ENCOUNTER — OFFICE VISIT (OUTPATIENT)
Dept: INTERNAL MEDICINE CLINIC | Facility: CLINIC | Age: 83
End: 2023-05-19
Payer: MEDICARE

## 2023-05-19 VITALS
WEIGHT: 144 LBS | OXYGEN SATURATION: 95 % | SYSTOLIC BLOOD PRESSURE: 128 MMHG | HEIGHT: 65 IN | DIASTOLIC BLOOD PRESSURE: 80 MMHG | TEMPERATURE: 97.2 F | HEART RATE: 95 BPM | BODY MASS INDEX: 23.99 KG/M2

## 2023-05-19 DIAGNOSIS — C34.81 MALIGNANT NEOPLASM OF OVERLAPPING SITES OF RIGHT LUNG (HCC): Primary | ICD-10-CM

## 2023-05-19 DIAGNOSIS — R21 SKIN RASH: ICD-10-CM

## 2023-05-19 PROCEDURE — G8427 DOCREV CUR MEDS BY ELIG CLIN: HCPCS | Performed by: NURSE PRACTITIONER

## 2023-05-19 PROCEDURE — G8420 CALC BMI NORM PARAMETERS: HCPCS | Performed by: NURSE PRACTITIONER

## 2023-05-19 PROCEDURE — 99214 OFFICE O/P EST MOD 30 MIN: CPT | Performed by: NURSE PRACTITIONER

## 2023-05-19 PROCEDURE — G8400 PT W/DXA NO RESULTS DOC: HCPCS | Performed by: NURSE PRACTITIONER

## 2023-05-19 PROCEDURE — 1123F ACP DISCUSS/DSCN MKR DOCD: CPT | Performed by: NURSE PRACTITIONER

## 2023-05-19 PROCEDURE — 1036F TOBACCO NON-USER: CPT | Performed by: NURSE PRACTITIONER

## 2023-05-19 PROCEDURE — 1090F PRES/ABSN URINE INCON ASSESS: CPT | Performed by: NURSE PRACTITIONER

## 2023-05-19 RX ORDER — CLINDAMYCIN HYDROCHLORIDE 150 MG/1
150 CAPSULE ORAL 3 TIMES DAILY
Qty: 30 CAPSULE | Refills: 0 | Status: SHIPPED | OUTPATIENT
Start: 2023-05-19 | End: 2023-05-29

## 2023-05-19 ASSESSMENT — PATIENT HEALTH QUESTIONNAIRE - PHQ9
SUM OF ALL RESPONSES TO PHQ QUESTIONS 1-9: 0
1. LITTLE INTEREST OR PLEASURE IN DOING THINGS: 0
SUM OF ALL RESPONSES TO PHQ QUESTIONS 1-9: 0
SUM OF ALL RESPONSES TO PHQ QUESTIONS 1-9: 0
SUM OF ALL RESPONSES TO PHQ9 QUESTIONS 1 & 2: 0
SUM OF ALL RESPONSES TO PHQ QUESTIONS 1-9: 0
2. FEELING DOWN, DEPRESSED OR HOPELESS: 0

## 2023-05-19 ASSESSMENT — ENCOUNTER SYMPTOMS
COLOR CHANGE: 1
DIARRHEA: 0
SHORTNESS OF BREATH: 0
VOMITING: 0
NAUSEA: 0
ABDOMINAL PAIN: 0

## 2023-05-19 NOTE — PROGRESS NOTES
vomiting. Skin:  Positive for color change and rash. Neurological:  Negative for dizziness and headaches. Objective:  /80 (Site: Left Upper Arm, Position: Sitting, Cuff Size: Large Adult)   Pulse 95   Temp 97.2 °F (36.2 °C) (Temporal)   Ht 5' 5\" (1.651 m)   Wt 144 lb (65.3 kg)   SpO2 95%   BMI 23.96 kg/m²     Examination:  Physical Exam  Vitals and nursing note reviewed. Constitutional:       General: She is not in acute distress. Appearance: Normal appearance. Cardiovascular:      Rate and Rhythm: Normal rate and regular rhythm. Pulmonary:      Effort: Pulmonary effort is normal. No respiratory distress. Breath sounds: Normal breath sounds. No wheezing or rales. Abdominal:      General: Bowel sounds are normal.      Palpations: Abdomen is soft. Musculoskeletal:      Right upper arm: Edema present. Right forearm: Edema present. Skin:     General: Skin is warm and dry. Findings: Erythema present. Comments: See photos below   Neurological:      Mental Status: She is alert and oriented to person, place, and time. Psychiatric:         Mood and Affect: Mood normal.                               Assessment/Plan:    Ban Ruiz was seen today for follow-up and referral - general.    Diagnoses and all orders for this visit:    Malignant neoplasm of overlapping sites of right lung (HCC)  -     clindamycin (CLEOCIN) 150 MG capsule; Take 1 capsule by mouth 3 times daily for 10 days  Continue Norco PRN, gabapentin as prescribed. Continue compounded cream as directed. Have reached out to oncology for recommendations. Awaiting response. Start clindamycin to cover for possible secondary infection. Start probiotic. Close follow-up on Monday, 5/22/23. Skin rash  -     clindamycin (CLEOCIN) 150 MG capsule; Take 1 capsule by mouth 3 times daily for 10 days          Follow-up and Dispositions    Return in 3 days (on 5/22/2023).          On this date, 5/19/23, I have

## 2023-05-20 RX ORDER — DESONIDE 0.5 MG/G
CREAM TOPICAL
Qty: 60 G | Refills: 1 | Status: SHIPPED | OUTPATIENT
Start: 2023-05-20

## 2023-05-22 ENCOUNTER — OFFICE VISIT (OUTPATIENT)
Dept: INTERNAL MEDICINE CLINIC | Facility: CLINIC | Age: 83
End: 2023-05-22
Payer: MEDICARE

## 2023-05-22 ENCOUNTER — HOSPITAL ENCOUNTER (OUTPATIENT)
Dept: CT IMAGING | Age: 83
Discharge: HOME | End: 2023-05-25
Payer: MEDICARE

## 2023-05-22 VITALS
BODY MASS INDEX: 23.99 KG/M2 | TEMPERATURE: 98.1 F | DIASTOLIC BLOOD PRESSURE: 74 MMHG | HEART RATE: 94 BPM | WEIGHT: 144 LBS | OXYGEN SATURATION: 96 % | HEIGHT: 65 IN | SYSTOLIC BLOOD PRESSURE: 126 MMHG

## 2023-05-22 DIAGNOSIS — R06.02 SOB (SHORTNESS OF BREATH): ICD-10-CM

## 2023-05-22 DIAGNOSIS — C34.81 MALIGNANT NEOPLASM OF OVERLAPPING SITES OF RIGHT LUNG (HCC): ICD-10-CM

## 2023-05-22 DIAGNOSIS — R21 SKIN RASH: Primary | ICD-10-CM

## 2023-05-22 DIAGNOSIS — C78.2 METASTASIS TO PLEURA (HCC): ICD-10-CM

## 2023-05-22 DIAGNOSIS — R21 SKIN RASH: ICD-10-CM

## 2023-05-22 LAB — CREAT BLD-MCNC: 0.8 MG/DL (ref 0.8–1.5)

## 2023-05-22 PROCEDURE — G8400 PT W/DXA NO RESULTS DOC: HCPCS | Performed by: INTERNAL MEDICINE

## 2023-05-22 PROCEDURE — 99215 OFFICE O/P EST HI 40 MIN: CPT | Performed by: INTERNAL MEDICINE

## 2023-05-22 PROCEDURE — 6360000004 HC RX CONTRAST MEDICATION: Performed by: INTERNAL MEDICINE

## 2023-05-22 PROCEDURE — 71260 CT THORAX DX C+: CPT

## 2023-05-22 PROCEDURE — 1036F TOBACCO NON-USER: CPT | Performed by: INTERNAL MEDICINE

## 2023-05-22 PROCEDURE — 82565 ASSAY OF CREATININE: CPT

## 2023-05-22 PROCEDURE — G8427 DOCREV CUR MEDS BY ELIG CLIN: HCPCS | Performed by: INTERNAL MEDICINE

## 2023-05-22 PROCEDURE — 1123F ACP DISCUSS/DSCN MKR DOCD: CPT | Performed by: INTERNAL MEDICINE

## 2023-05-22 PROCEDURE — 1090F PRES/ABSN URINE INCON ASSESS: CPT | Performed by: INTERNAL MEDICINE

## 2023-05-22 PROCEDURE — G8420 CALC BMI NORM PARAMETERS: HCPCS | Performed by: INTERNAL MEDICINE

## 2023-05-22 RX ORDER — FUROSEMIDE 20 MG/1
20 TABLET ORAL DAILY
Qty: 60 TABLET | Refills: 0 | Status: SHIPPED | OUTPATIENT
Start: 2023-05-22

## 2023-05-22 RX ORDER — TRIAMCINOLONE ACETONIDE 0.25 MG/G
CREAM TOPICAL
Qty: 80 G | Refills: 0 | Status: SHIPPED | OUTPATIENT
Start: 2023-05-22

## 2023-05-22 RX ADMIN — IOPAMIDOL 100 ML: 755 INJECTION, SOLUTION INTRAVENOUS at 14:36

## 2023-05-22 ASSESSMENT — ENCOUNTER SYMPTOMS
SHORTNESS OF BREATH: 1
ABDOMINAL PAIN: 0
COUGH: 1
BACK PAIN: 1
WHEEZING: 0

## 2023-05-22 NOTE — PROGRESS NOTES
pleural effusion, right. Metastatic CA. Symptom relief. See office note 5/22/23    CT images personally reviewed. Report reviewed. Start Lasix 20 mg BID  for 3 days and then daily thereafter. Follow up with me in 1 wk to re check. Darcie Joe MD    CAT Scan Result (most recent):  CT CHEST PULMONARY EMBOLISM W CONTRAST 05/22/2023    Narrative  CT OF THE CHEST WITH INTRAVENOUS CONTRAST. INDICATION: Right lung malignancy and worsening shortness of breath for 3 days  and back pain radiating to chest. Right arm lymphedema. COMPARISON: November 2020 2/20/2022, April 2022. TECHNIQUE:   2.5 mm axial scans from above the aortic arch to the lung bases  with 100 Isovue 370 nonionic intravenous contrast without acute complication. Intravenous contrast was given to evaluate for pulmonary embolism. Radiation  dose reduction techniques were used for this study. Our CT scanners use one or  more of the following:  Automated exposure control, adjustment of the mA and or  kV according to patient size, iterative reconstruction. FINDINGS:  PULMONARY ARTERY OPACIFICATION: Adequate. NO intraluminal filling defects within the pulmonary arterial tree to suggest  pulmonary embolism. LUNGS: 8 x 14 mm nodule lateral right upper lobe. Bilateral groundglass  opacities. Small amount of compressive atelectasis at the bases. AIRWAYS: Trachea and proximal bronchi grossly patent. PLEURA: Bilateral pleural effusions, right bilateral pleural effusions,  groundglass opacities and extensive adenopathy and right upper lobe nodule. Than  left. LYMPH NODES: There are enlarged prevascular AP window and bilateral hilar and  subcarinal and paratracheal lymph nodes. Jaimie Donate HEART: Normal size. CORONARIES: Extensive calcifications. AORTA is normal caliber with a uniform lumen without evidence of dissection. UPPER ABDOMEN: Normal size adrenal glands. Hypoattenuating foci in the liver,  probable cysts.   SKELETAL/CHEST
Using  real-time ultrasound guidance, 4 18-gauge, 2 cm core biopsies were obtained and  placed in formalin and RPMI. Following the biopsy, ultrasound examination demonstrates no evidence of  hematoma. A dressing was applied. Complications: None. Medications: None. Findings: 3.4 cm right axillary lymph node. Impression  Technically successful core biopsy of a right axillary lymph node. Specimen: Sent to pathology    Plan: The patient is discharged to home in good and stable condition. 02/06/23    VAS DUP UPPER EXTREMITY VENOUS RIGHT 02/06/2023 12:38 PM (Final)    Narrative  TITLE: Upper extremity venous ultrasound examination. INDICATION: Right extremity pain and swelling. TECHNIQUE: Grayscale, Color, and Spectral Doppler interrogation performed. COMPARISON: None. FINDINGS: Normal compressibility and flow waveform of the right upper extremity  veins including forearm veins cephalic vein, basilic vein, axillary, subclavian  and innominate vein. Right internal jugular vein is compressible and patent    Impression  No ultrasound evidence of right upper extremity venous thrombosis. Prominent lymph nodes seen in the right axillary space not significantly changed  compared with PET/CT scan December 6, 2022    Signed by: Joselin Thrasher MD on 2/6/2023 12:38 PM      Vitals:    05/22/23 1142   BP: 126/74   Site: Left Upper Arm   Position: Sitting   Cuff Size: Small Adult   Pulse: 94   Temp: 98.1 °F (36.7 °C)   TempSrc: Oral   SpO2: 96%   Weight: 144 lb (65.3 kg)   Height: 5' 5\" (1.651 m)     Wt Readings from Last 3 Encounters:   05/22/23 144 lb (65.3 kg)   05/19/23 144 lb (65.3 kg)   05/11/23 140 lb (63.5 kg)     BP Readings from Last 3 Encounters:   05/22/23 126/74   05/19/23 128/80   05/11/23 130/74     Physical Exam  Vitals and nursing note reviewed. Constitutional:       Appearance: She is not toxic-appearing. Diaphoretic: sob mild.   Cardiovascular:      Rate and Rhythm: Normal rate

## 2023-05-22 NOTE — RESULT ENCOUNTER NOTE
Called patient and discussed. Refer to interventional radiology for thoracentesis. Start Lasix 20 mg BID for 3 days and then daily thereafter. Follow up with me in about 7-10 days. Otherwise treatment as discussed in office today.       Christeen Fabry, MD

## 2023-05-23 ENCOUNTER — HOSPITAL ENCOUNTER (OUTPATIENT)
Dept: INTERVENTIONAL RADIOLOGY/VASCULAR | Age: 83
Discharge: HOME | End: 2023-05-26
Payer: MEDICARE

## 2023-05-23 VITALS
HEART RATE: 90 BPM | BODY MASS INDEX: 23.99 KG/M2 | RESPIRATION RATE: 18 BRPM | OXYGEN SATURATION: 92 % | WEIGHT: 144 LBS | DIASTOLIC BLOOD PRESSURE: 58 MMHG | TEMPERATURE: 98.1 F | HEIGHT: 65 IN | SYSTOLIC BLOOD PRESSURE: 124 MMHG

## 2023-05-23 DIAGNOSIS — C34.81 MALIGNANT NEOPLASM OF OVERLAPPING SITES OF RIGHT LUNG (HCC): ICD-10-CM

## 2023-05-23 DIAGNOSIS — R06.02 SOB (SHORTNESS OF BREATH): ICD-10-CM

## 2023-05-23 DIAGNOSIS — C78.2 METASTASIS TO PLEURA (HCC): ICD-10-CM

## 2023-05-23 DIAGNOSIS — R21 SKIN RASH: ICD-10-CM

## 2023-05-23 DIAGNOSIS — R21 SKIN RASH: Primary | ICD-10-CM

## 2023-05-23 PROCEDURE — 32555 ASPIRATE PLEURA W/ IMAGING: CPT

## 2023-05-23 PROCEDURE — 2500000003 HC RX 250 WO HCPCS: Performed by: RADIOLOGY

## 2023-05-23 RX ORDER — LIDOCAINE HYDROCHLORIDE 10 MG/ML
INJECTION, SOLUTION EPIDURAL; INFILTRATION; INTRACAUDAL; PERINEURAL PRN
Status: DISCONTINUED | OUTPATIENT
Start: 2023-05-23 | End: 2023-05-27 | Stop reason: HOSPADM

## 2023-05-23 RX ADMIN — LIDOCAINE HYDROCHLORIDE 5 ML: 10 INJECTION, SOLUTION EPIDURAL; INFILTRATION; INTRACAUDAL; PERINEURAL at 11:18

## 2023-05-23 NOTE — DISCHARGE INSTRUCTIONS
[address] [facility]     Department of Interventional Radiology     [facility] Radiology     [phone] Office     [phone] Fax       THORACENTESIS DISCHARGE INSTRUCTIONS           General Information:     During this procedures, the doctor will insert a needle into the body to drain fluid from the chest. After the procedure, you will be able to take a deep breath much easier. The site of the puncture may ooze the first day. This will decrease and eventually stop. With the Thoracentesis (draining fluid from the chest), there is a risk of air leaking into the chest around the lung, and risk of bleeding into the chest, with the resulting pressure on the lung possibly making it collapse. A chest x-ray is done after the procedure to detect possible complications. Home Care Instructions:     Keep the puncture site clean and dry. No tub baths or swimming until puncture site heals. Showering is acceptable. Resume your normal diet, and resume your normal activity slowly and as you tolerate. If you are short of breath, rest. If shortness of breath does not ease, please call your ordering doctor. Fluid can re-accumulate in the chest and/or in the abdomen. If this should occur, your doctor needs to know as you may need to have the procedure done again. Call If:        You should call your Physician and/or the Radiology Nurse if you notice any signs of infection, like pus draining, or if it is swollen or reddened. Also call if you have a fever, or if you are bleeding from the puncture site more than a small amount on the dressing. Call if the puncture site keeps draining fluid. Some oozing is to be expected, but should slow and then stop. Call if you feel like you have pressure in your abdomen. SEEK IMMEDIATE CARE OR CALL 911 IF YOU SUDDENLY HAVE TROUBLE BREATHING, OR IF YOUR LIPS TURN BLUE, OR IF YOU NOTICE BLOOD IN YOUR SPUTUM.            Follow-Up Instructions:   Please

## 2023-05-23 NOTE — OP NOTE
Department of Interventional Radiology  [phone]        Interventional Radiology Brief Procedure Note    Patient: Tera Keys MRN: [id number]  SSN: [id number]    YOB: 1940  Age: 81 y.o.   Sex: female      Date of Procedure: 5/23/2023    Pre-Procedure Diagnosis: moderate to large symptomatic right pleural fluid    Post-Procedure Diagnosis: SAME    Procedure(s): Thoracentesis    Brief Description of Procedure: ultrasound guided in upright position    Performed By: Tawana Post MD     Assistants: None    Anesthesia:Lidocaine    Estimated Blood Loss: None    Specimens:  None    Implants:  None    Findings: smaller than expected effusion in up right position, 600cc aspirated    Complications: None       Follow Up: as needed    Signed By: Tawana Post MD     May 23, 2023

## 2023-05-25 ENCOUNTER — PATIENT MESSAGE (OUTPATIENT)
Dept: PALLATIVE CARE | Age: 83
End: 2023-05-25

## 2023-05-25 NOTE — TELEPHONE ENCOUNTER
From: Logan Knott  To: Gm Rainey  Sent: 5/25/2023 2:54 PM EDT  Subject: Shortness of breath    Hi Pepper [place],   I am sorry to bother you. I am not doing good and I wondered if you could me a call Please if you have time. My cell phone is [phone].   Thank you ,  David Vazquez

## 2023-05-25 NOTE — TELEPHONE ENCOUNTER
Received [employer] message about patient feeling unwell with fatigue and shortness of breath. Called and spoke with patient about her symptoms. Her shortness of breath is no worse than prior to CT, thoracentesis- it is just not getting any better. This has contributed to significant fatigue, to the point of not being able to do everything that needs to be done around the house. She is wondering about having services in the home. We discussed home health, home based palliative care, and home based hospice, as well as sitter agencies that could help with household things. After discussing the philosophies and services provided, patient will contact [address] [place] [address]. She will consider home health. She is receptive to hospice, but doesn't quite feel like it is time for this yet.   She is instructed that if her shortness of breath worsens, to seek medical attention

## 2023-05-29 ENCOUNTER — APPOINTMENT (OUTPATIENT)
Dept: GENERAL RADIOLOGY | Age: 83
End: 2023-05-29
Payer: MEDICARE

## 2023-05-29 ENCOUNTER — HOSPITAL ENCOUNTER (OUTPATIENT)
Age: 83
Setting detail: OBSERVATION
Discharge: NURSING, REHAB OR LONG-TERM CARE | End: 2023-05-31
Attending: EMERGENCY MEDICINE | Admitting: INTERNAL MEDICINE
Payer: MEDICARE

## 2023-05-29 DIAGNOSIS — C34.90 ADENOCARCINOMA OF LUNG, STAGE 4, UNSPECIFIED LATERALITY (HCC): ICD-10-CM

## 2023-05-29 DIAGNOSIS — R06.00 DYSPNEA, UNSPECIFIED TYPE: Primary | ICD-10-CM

## 2023-05-29 DIAGNOSIS — C34.90 MALIGNANT NEOPLASM OF LUNG, UNSPECIFIED LATERALITY, UNSPECIFIED PART OF LUNG (HCC): ICD-10-CM

## 2023-05-29 LAB
ALBUMIN SERPL-MCNC: 2.8 G/DL (ref 3.2–4.6)
ALBUMIN/GLOB SERPL: 0.8 (ref 0.4–1.6)
ALP SERPL-CCNC: 167 U/L (ref 50–136)
ALT SERPL-CCNC: 53 U/L (ref 12–65)
ANION GAP SERPL CALC-SCNC: 4 MMOL/L (ref 2–11)
AST SERPL-CCNC: 80 U/L (ref 15–37)
BASOPHILS # BLD: 0.1 K/UL (ref 0–0.2)
BASOPHILS NFR BLD: 1 % (ref 0–2)
BILIRUB SERPL-MCNC: 0.6 MG/DL (ref 0.2–1.1)
BUN SERPL-MCNC: 11 MG/DL (ref 8–23)
CALCIUM SERPL-MCNC: 8.7 MG/DL (ref 8.3–10.4)
CHLORIDE SERPL-SCNC: 103 MMOL/L (ref 101–110)
CO2 SERPL-SCNC: 26 MMOL/L (ref 21–32)
CREAT SERPL-MCNC: 0.77 MG/DL (ref 0.6–1)
DIFFERENTIAL METHOD BLD: ABNORMAL
EKG ATRIAL RATE: 85 BPM
EKG DIAGNOSIS: NORMAL
EKG P AXIS: 60 DEGREES
EKG P-R INTERVAL: 150 MS
EKG Q-T INTERVAL: 375 MS
EKG QRS DURATION: 104 MS
EKG QTC CALCULATION (BAZETT): 454 MS
EKG R AXIS: -27 DEGREES
EKG T AXIS: 72 DEGREES
EKG VENTRICULAR RATE: 88 BPM
EOSINOPHIL # BLD: 0.3 K/UL (ref 0–0.8)
EOSINOPHIL NFR BLD: 3 % (ref 0.5–7.8)
ERYTHROCYTE [DISTWIDTH] IN BLOOD BY AUTOMATED COUNT: 14 % (ref 11.9–14.6)
GLOBULIN SER CALC-MCNC: 3.3 G/DL (ref 2.8–4.5)
GLUCOSE SERPL-MCNC: 107 MG/DL (ref 65–100)
HCT VFR BLD AUTO: 36.2 % (ref 35.8–46.3)
HGB BLD-MCNC: 12.2 G/DL (ref 11.7–15.4)
IMM GRANULOCYTES # BLD AUTO: 0.1 K/UL (ref 0–0.5)
IMM GRANULOCYTES NFR BLD AUTO: 2 % (ref 0–5)
LYMPHOCYTES # BLD: 0.6 K/UL (ref 0.5–4.6)
LYMPHOCYTES NFR BLD: 7 % (ref 13–44)
MAGNESIUM SERPL-MCNC: 2.2 MG/DL (ref 1.8–2.4)
MCH RBC QN AUTO: 31.4 PG (ref 26.1–32.9)
MCHC RBC AUTO-ENTMCNC: 33.7 G/DL (ref 31.4–35)
MCV RBC AUTO: 93.3 FL (ref 82–102)
MONOCYTES # BLD: 0.9 K/UL (ref 0.1–1.3)
MONOCYTES NFR BLD: 10 % (ref 4–12)
NEUTS SEG # BLD: 7.1 K/UL (ref 1.7–8.2)
NEUTS SEG NFR BLD: 78 % (ref 43–78)
NRBC # BLD: 0 K/UL (ref 0–0.2)
NT PRO BNP: 815 PG/ML
PLATELET # BLD AUTO: 192 K/UL (ref 150–450)
PMV BLD AUTO: 9.8 FL (ref 9.4–12.3)
POTASSIUM SERPL-SCNC: 4.8 MMOL/L (ref 3.5–5.1)
PROT SERPL-MCNC: 6.1 G/DL (ref 6.3–8.2)
RBC # BLD AUTO: 3.88 M/UL (ref 4.05–5.2)
SODIUM SERPL-SCNC: 133 MMOL/L (ref 133–143)
WBC # BLD AUTO: 9.1 K/UL (ref 4.3–11.1)

## 2023-05-29 PROCEDURE — 71045 X-RAY EXAM CHEST 1 VIEW: CPT

## 2023-05-29 PROCEDURE — 94760 N-INVAS EAR/PLS OXIMETRY 1: CPT

## 2023-05-29 PROCEDURE — 6360000002 HC RX W HCPCS: Performed by: PHYSICIAN ASSISTANT

## 2023-05-29 PROCEDURE — 83735 ASSAY OF MAGNESIUM: CPT

## 2023-05-29 PROCEDURE — 96374 THER/PROPH/DIAG INJ IV PUSH: CPT

## 2023-05-29 PROCEDURE — G0378 HOSPITAL OBSERVATION PER HR: HCPCS

## 2023-05-29 PROCEDURE — 6370000000 HC RX 637 (ALT 250 FOR IP): Performed by: PHYSICIAN ASSISTANT

## 2023-05-29 PROCEDURE — 94761 N-INVAS EAR/PLS OXIMETRY MLT: CPT

## 2023-05-29 PROCEDURE — 93005 ELECTROCARDIOGRAM TRACING: CPT | Performed by: EMERGENCY MEDICINE

## 2023-05-29 PROCEDURE — 73630 X-RAY EXAM OF FOOT: CPT

## 2023-05-29 PROCEDURE — 99285 EMERGENCY DEPT VISIT HI MDM: CPT

## 2023-05-29 PROCEDURE — 93010 ELECTROCARDIOGRAM REPORT: CPT | Performed by: INTERNAL MEDICINE

## 2023-05-29 PROCEDURE — 94762 N-INVAS EAR/PLS OXIMTRY CONT: CPT

## 2023-05-29 PROCEDURE — 83880 ASSAY OF NATRIURETIC PEPTIDE: CPT

## 2023-05-29 PROCEDURE — 96375 TX/PRO/DX INJ NEW DRUG ADDON: CPT

## 2023-05-29 PROCEDURE — 6360000002 HC RX W HCPCS: Performed by: INTERNAL MEDICINE

## 2023-05-29 PROCEDURE — 2700000000 HC OXYGEN THERAPY PER DAY

## 2023-05-29 PROCEDURE — 2580000003 HC RX 258: Performed by: PHYSICIAN ASSISTANT

## 2023-05-29 PROCEDURE — 80053 COMPREHEN METABOLIC PANEL: CPT

## 2023-05-29 PROCEDURE — 85025 COMPLETE CBC W/AUTO DIFF WBC: CPT

## 2023-05-29 RX ORDER — ONDANSETRON 2 MG/ML
4 INJECTION INTRAMUSCULAR; INTRAVENOUS ONCE
Status: COMPLETED | OUTPATIENT
Start: 2023-05-29 | End: 2023-05-29

## 2023-05-29 RX ORDER — ACETAMINOPHEN 650 MG/1
650 SUPPOSITORY RECTAL EVERY 6 HOURS PRN
Status: DISCONTINUED | OUTPATIENT
Start: 2023-05-29 | End: 2023-05-31

## 2023-05-29 RX ORDER — MORPHINE SULFATE 2 MG/ML
2 INJECTION, SOLUTION INTRAMUSCULAR; INTRAVENOUS
Status: COMPLETED | OUTPATIENT
Start: 2023-05-29 | End: 2023-05-29

## 2023-05-29 RX ORDER — SODIUM CHLORIDE 0.9 % (FLUSH) 0.9 %
5 SYRINGE (ML) INJECTION EVERY 8 HOURS
Status: DISCONTINUED | OUTPATIENT
Start: 2023-05-29 | End: 2023-05-31 | Stop reason: HOSPADM

## 2023-05-29 RX ORDER — CETIRIZINE HYDROCHLORIDE 10 MG/1
10 TABLET ORAL DAILY
Status: DISCONTINUED | OUTPATIENT
Start: 2023-05-30 | End: 2023-05-31 | Stop reason: HOSPADM

## 2023-05-29 RX ORDER — HYDROCODONE BITARTRATE AND ACETAMINOPHEN 10; 325 MG/1; MG/1
1 TABLET ORAL EVERY 4 HOURS PRN
Status: DISCONTINUED | OUTPATIENT
Start: 2023-05-29 | End: 2023-05-30

## 2023-05-29 RX ORDER — ACETAMINOPHEN 325 MG/1
650 TABLET ORAL EVERY 6 HOURS PRN
Status: DISCONTINUED | OUTPATIENT
Start: 2023-05-29 | End: 2023-05-31

## 2023-05-29 RX ORDER — FUROSEMIDE 10 MG/ML
20 INJECTION INTRAMUSCULAR; INTRAVENOUS ONCE
Status: COMPLETED | OUTPATIENT
Start: 2023-05-29 | End: 2023-05-29

## 2023-05-29 RX ORDER — LATANOPROST 50 UG/ML
1 SOLUTION/ DROPS OPHTHALMIC NIGHTLY
Status: DISCONTINUED | OUTPATIENT
Start: 2023-05-29 | End: 2023-05-31 | Stop reason: HOSPADM

## 2023-05-29 RX ORDER — ONDANSETRON 4 MG/1
4 TABLET, ORALLY DISINTEGRATING ORAL EVERY 8 HOURS PRN
Status: DISCONTINUED | OUTPATIENT
Start: 2023-05-29 | End: 2023-05-31 | Stop reason: HOSPADM

## 2023-05-29 RX ORDER — ENOXAPARIN SODIUM 100 MG/ML
40 INJECTION SUBCUTANEOUS EVERY 24 HOURS
Status: DISCONTINUED | OUTPATIENT
Start: 2023-05-29 | End: 2023-05-29

## 2023-05-29 RX ORDER — BISACODYL 10 MG
10 SUPPOSITORY, RECTAL RECTAL DAILY PRN
Status: DISCONTINUED | OUTPATIENT
Start: 2023-05-29 | End: 2023-05-31 | Stop reason: HOSPADM

## 2023-05-29 RX ORDER — SODIUM CHLORIDE 0.9 % (FLUSH) 0.9 %
5 SYRINGE (ML) INJECTION AS NEEDED
Status: DISCONTINUED | OUTPATIENT
Start: 2023-05-29 | End: 2023-05-31 | Stop reason: HOSPADM

## 2023-05-29 RX ORDER — LIDOCAINE 50 MG/G
OINTMENT TOPICAL PRN
Status: DISCONTINUED | OUTPATIENT
Start: 2023-05-29 | End: 2023-05-29

## 2023-05-29 RX ORDER — GABAPENTIN 100 MG/1
100 CAPSULE ORAL 2 TIMES DAILY PRN
Status: DISCONTINUED | OUTPATIENT
Start: 2023-05-29 | End: 2023-05-31 | Stop reason: HOSPADM

## 2023-05-29 RX ORDER — SODIUM CHLORIDE 0.9 % (FLUSH) 0.9 %
5-40 SYRINGE (ML) INJECTION EVERY 12 HOURS SCHEDULED
Status: DISCONTINUED | OUTPATIENT
Start: 2023-05-29 | End: 2023-05-31 | Stop reason: HOSPADM

## 2023-05-29 RX ORDER — ONDANSETRON 2 MG/ML
4 INJECTION INTRAMUSCULAR; INTRAVENOUS EVERY 6 HOURS PRN
Status: DISCONTINUED | OUTPATIENT
Start: 2023-05-29 | End: 2023-05-31 | Stop reason: HOSPADM

## 2023-05-29 RX ORDER — PANTOPRAZOLE SODIUM 40 MG/1
40 TABLET, DELAYED RELEASE ORAL
Status: DISCONTINUED | OUTPATIENT
Start: 2023-05-30 | End: 2023-05-31 | Stop reason: HOSPADM

## 2023-05-29 RX ORDER — TIMOLOL MALEATE 5 MG/ML
2 SOLUTION/ DROPS OPHTHALMIC DAILY
Status: DISCONTINUED | OUTPATIENT
Start: 2023-05-30 | End: 2023-05-31 | Stop reason: HOSPADM

## 2023-05-29 RX ORDER — POLYETHYLENE GLYCOL 3350 17 G/17G
17 POWDER, FOR SOLUTION ORAL DAILY PRN
Status: DISCONTINUED | OUTPATIENT
Start: 2023-05-29 | End: 2023-05-31 | Stop reason: HOSPADM

## 2023-05-29 RX ORDER — TEMAZEPAM 15 MG/1
15 CAPSULE ORAL NIGHTLY PRN
Status: DISCONTINUED | OUTPATIENT
Start: 2023-05-29 | End: 2023-05-31 | Stop reason: HOSPADM

## 2023-05-29 RX ORDER — HYDROMORPHONE HYDROCHLORIDE 1 MG/ML
0.5 INJECTION, SOLUTION INTRAMUSCULAR; INTRAVENOUS; SUBCUTANEOUS EVERY 4 HOURS PRN
Status: DISCONTINUED | OUTPATIENT
Start: 2023-05-29 | End: 2023-05-31 | Stop reason: HOSPADM

## 2023-05-29 RX ORDER — ENOXAPARIN SODIUM 100 MG/ML
40 INJECTION SUBCUTANEOUS EVERY 24 HOURS
Status: DISCONTINUED | OUTPATIENT
Start: 2023-05-29 | End: 2023-05-31 | Stop reason: HOSPADM

## 2023-05-29 RX ORDER — SODIUM CHLORIDE 9 MG/ML
INJECTION, SOLUTION INTRAVENOUS PRN
Status: DISCONTINUED | OUTPATIENT
Start: 2023-05-29 | End: 2023-05-31 | Stop reason: HOSPADM

## 2023-05-29 RX ORDER — SODIUM CHLORIDE 0.9 % (FLUSH) 0.9 %
5-40 SYRINGE (ML) INJECTION PRN
Status: DISCONTINUED | OUTPATIENT
Start: 2023-05-29 | End: 2023-05-31 | Stop reason: HOSPADM

## 2023-05-29 RX ADMIN — POLYETHYLENE GLYCOL 3350 17 G: 17 POWDER, FOR SOLUTION ORAL at 17:25

## 2023-05-29 RX ADMIN — SODIUM CHLORIDE, PRESERVATIVE FREE 10 ML: 5 INJECTION INTRAVENOUS at 20:16

## 2023-05-29 RX ADMIN — ENOXAPARIN SODIUM 40 MG: 100 INJECTION SUBCUTANEOUS at 17:16

## 2023-05-29 RX ADMIN — HYDROCODONE BITARTRATE AND ACETAMINOPHEN 1 TABLET: 10; 325 TABLET ORAL at 17:17

## 2023-05-29 RX ADMIN — MORPHINE SULFATE 2 MG: 2 INJECTION, SOLUTION INTRAMUSCULAR; INTRAVENOUS at 14:54

## 2023-05-29 RX ADMIN — ONDANSETRON 4 MG: 2 INJECTION INTRAMUSCULAR; INTRAVENOUS at 14:21

## 2023-05-29 RX ADMIN — FENTANYL CITRATE 50 MCG: 50 INJECTION INTRAMUSCULAR; INTRAVENOUS at 14:22

## 2023-05-29 RX ADMIN — FUROSEMIDE 20 MG: 10 INJECTION, SOLUTION INTRAMUSCULAR; INTRAVENOUS at 14:54

## 2023-05-29 RX ADMIN — HYDROMORPHONE HYDROCHLORIDE 0.5 MG: 1 INJECTION, SOLUTION INTRAMUSCULAR; INTRAVENOUS; SUBCUTANEOUS at 22:52

## 2023-05-29 RX ADMIN — LATANOPROST 1 DROP: 50 SOLUTION OPHTHALMIC at 20:14

## 2023-05-29 ASSESSMENT — ENCOUNTER SYMPTOMS
SHORTNESS OF BREATH: 1
GASTROINTESTINAL NEGATIVE: 1

## 2023-05-29 ASSESSMENT — PAIN SCALES - GENERAL
PAINLEVEL_OUTOF10: 7
PAINLEVEL_OUTOF10: 8
PAINLEVEL_OUTOF10: 10

## 2023-05-29 ASSESSMENT — PAIN DESCRIPTION - LOCATION
LOCATION: BACK;RIB CAGE
LOCATION: ARM;CHEST
LOCATION: LEG
LOCATION: BACK

## 2023-05-29 ASSESSMENT — PAIN - FUNCTIONAL ASSESSMENT: PAIN_FUNCTIONAL_ASSESSMENT: 0-10

## 2023-05-29 ASSESSMENT — PAIN DESCRIPTION - DESCRIPTORS: DESCRIPTORS: SHARP

## 2023-05-29 NOTE — ED PROVIDER NOTES
soft.      Tenderness: There is no abdominal tenderness. There is no guarding or rebound. Musculoskeletal:         General: Normal range of motion. Cervical back: Normal range of motion and neck supple. Right lower leg: No tenderness. Edema present. Left lower leg: No tenderness. Edema present. Lymphadenopathy:      Cervical: No cervical adenopathy. Skin:     General: Skin is warm and dry. Findings: Erythema (Radiation burn across the right chest and right breast.  Bruising right posterior thorax from last week's thoracentesis) present. No rash. Neurological:      General: No focal deficit present. Mental Status: She is alert. Mental status is at baseline. Psychiatric:         Mood and Affect: Mood normal.         Behavior: Behavior normal.         Thought Content: Thought content normal.        Procedures     Procedures    Orders Placed This Encounter   Procedures    XR CHEST PORTABLE    XR FOOT RIGHT (MIN 3 VIEWS)    CBC with Auto Differential    Comprehensive Metabolic Panel    Magnesium    Brain Natriuretic Peptide    ADULT DIET;  Regular    Cardiac Monitor - ED Only    Continuous Pulse Oximetry    Vital signs per unit routine    Up as tolerated    Do Not Resuscitate    Inpatient consult to Hospice    Inpatient consult to Social Work    Inpatient consult to Spiritual Services    Initiate Oxygen Therapy Protocol    Nasal Cannula Oxygen    EKG 12 Lead    6 Minute Walk Test    Saline lock IV    Place in Observation Service        Medications   sodium chloride flush 0.9 % injection 5 mL (5 mLs IntraVENous Not Given 5/29/23 1717)   sodium chloride flush 0.9 % injection 5 mL (has no administration in time range)   cetirizine (ZYRTEC) tablet 10 mg (has no administration in time range)   pantoprazole (PROTONIX) tablet 40 mg (has no administration in time range)   gabapentin (NEURONTIN) capsule 100 mg (has no administration in time range)   hyoscyamine (LEVSIN/SL) sublingual tablet 0.125

## 2023-05-29 NOTE — ED TRIAGE NOTES
Patient in wheelchair to triage. Pt c/o SOB x2-3 weeks but has gotten worse x2-3 days. Pt also c/o back pain that occasionally radiates to under ribs. Denies chest pain. Pt states that she has lung cancer.

## 2023-05-29 NOTE — H&P
Hospitalist History and Physical   Admit Date:  2023 12:57 PM   Name:  Shani Hollingsworth   Age:  81 y.o. Sex:  female  :  1940   MRN:  [id number]   Room:  ER03/03    Presenting/Chief Complaint: Shortness of Breath     Reason(s) for Admission: Adenocarcinoma of lung, stage 4, unspecified laterality ([facility] [address] [C34.90]     History of Present Illness:   Shani Hollingsworth is a 81 y.o. female with medical history of metastatic NSCLC s/p chemotherapy, immunotherapy and radiation therapy, RUE lymphedema as sequelae of XRT who presented to the ER on  with c/o increasing SOB and fatigue. The patient is followed by heme/onc and palliative care as an outpatient. She poorly tolerated therapies and stopped all treatment of her lung Ca earlier this year. She has been suffering from SOB and fatigue, which have been getting progressively worse. She underwent thoracentesis on , resulting in 600 cc fluid being removed from her R lung. Imaging in the ER today does not demonstrate large effusions. Pt does note that she has had increased swelling in her ankles. She has been unable to do her ADLs. She notes that she came to the hospital seeking help in being placed on home hospice. Pt was noted to have an O2 sat of 88% on RA in the ER, and was also tachycardic. She will be admitted for further care. Assessment & Plan:     Principal Problem:    Adenocarcinoma of lung, stage 4, unspecified laterality ([address]    Respiratory failure  Plan: Pt received IV Lasix x 1 in the ER. We will observe I/Os and monitor her respiratory status to determine additional doses. - Likely would benefit from additional dose tomorrow  - Will keep on O2 and check for O2 needs as outpatient  - Pt expressed that hse desires better pain control. She has been poorly sleeping due to increased back pain   - Will order PRN Norco 10 and IV Dilaudid.    - Consult hospice for home hospice  - C/S case mgmt as well     Rash due to radiation

## 2023-05-29 NOTE — ED NOTES
TRANSFER - OUT REPORT:    Verbal report given to [facility] RN on Shani Hollingsworth  being transferred to [facility] for routine progression of patient care       Report consisted of patient's Situation, Background, Assessment and   Recommendations(SBAR). Information from the following report(s) ED SBAR was reviewed with the receiving nurse. Lines:   Peripheral IV 05/29/23 Left Forearm (Active)        Opportunity for questions and clarification was provided.       Patient transported with:  Registered Nurse       Ambrocio Herman RN  05/29/23 7780

## 2023-05-30 ENCOUNTER — HOSPICE ADMISSION (OUTPATIENT)
Dept: HOSPICE | Facility: HOSPICE | Age: 83
End: 2023-05-30
Payer: MEDICARE

## 2023-05-30 PROCEDURE — 2580000003 HC RX 258: Performed by: PHYSICIAN ASSISTANT

## 2023-05-30 PROCEDURE — 6370000000 HC RX 637 (ALT 250 FOR IP): Performed by: INTERNAL MEDICINE

## 2023-05-30 PROCEDURE — G0378 HOSPITAL OBSERVATION PER HR: HCPCS

## 2023-05-30 PROCEDURE — 6370000000 HC RX 637 (ALT 250 FOR IP): Performed by: PHYSICIAN ASSISTANT

## 2023-05-30 PROCEDURE — 94760 N-INVAS EAR/PLS OXIMETRY 1: CPT

## 2023-05-30 PROCEDURE — 6360000002 HC RX W HCPCS: Performed by: PHYSICIAN ASSISTANT

## 2023-05-30 RX ORDER — FUROSEMIDE 20 MG/1
20 TABLET ORAL DAILY
Status: DISCONTINUED | OUTPATIENT
Start: 2023-05-30 | End: 2023-05-31 | Stop reason: HOSPADM

## 2023-05-30 RX ORDER — IBUPROFEN 600 MG/1
600 TABLET ORAL EVERY 6 HOURS PRN
Status: DISCONTINUED | OUTPATIENT
Start: 2023-05-30 | End: 2023-05-30

## 2023-05-30 RX ORDER — HYDROCODONE BITARTRATE AND ACETAMINOPHEN 5; 325 MG/1; MG/1
1 TABLET ORAL EVERY 6 HOURS PRN
Status: DISCONTINUED | OUTPATIENT
Start: 2023-05-30 | End: 2023-05-31

## 2023-05-30 RX ORDER — KETOROLAC TROMETHAMINE 30 MG/ML
15 INJECTION, SOLUTION INTRAMUSCULAR; INTRAVENOUS ONCE
Status: COMPLETED | OUTPATIENT
Start: 2023-05-30 | End: 2023-05-30

## 2023-05-30 RX ORDER — IBUPROFEN 600 MG/1
600 TABLET ORAL EVERY 6 HOURS PRN
Status: DISCONTINUED | OUTPATIENT
Start: 2023-05-30 | End: 2023-05-31 | Stop reason: HOSPADM

## 2023-05-30 RX ADMIN — FUROSEMIDE 20 MG: 20 TABLET ORAL at 12:35

## 2023-05-30 RX ADMIN — HYDROCODONE BITARTRATE AND ACETAMINOPHEN 1 TABLET: 5; 325 TABLET ORAL at 15:47

## 2023-05-30 RX ADMIN — POLYETHYLENE GLYCOL 3350 17 G: 17 POWDER, FOR SOLUTION ORAL at 18:17

## 2023-05-30 RX ADMIN — TIMOLOL MALEATE 2 DROP: 5 SOLUTION OPHTHALMIC at 10:28

## 2023-05-30 RX ADMIN — PANTOPRAZOLE SODIUM 40 MG: 40 TABLET, DELAYED RELEASE ORAL at 06:13

## 2023-05-30 RX ADMIN — IBUPROFEN 600 MG: 600 TABLET, FILM COATED ORAL at 19:31

## 2023-05-30 RX ADMIN — HYDROCODONE BITARTRATE AND ACETAMINOPHEN 1 TABLET: 10; 325 TABLET ORAL at 03:07

## 2023-05-30 RX ADMIN — LATANOPROST 1 DROP: 50 SOLUTION OPHTHALMIC at 19:32

## 2023-05-30 RX ADMIN — BISACODYL 10 MG: 10 SUPPOSITORY RECTAL at 14:11

## 2023-05-30 RX ADMIN — SODIUM CHLORIDE, PRESERVATIVE FREE 12 ML: 5 INJECTION INTRAVENOUS at 12:33

## 2023-05-30 RX ADMIN — ONDANSETRON 4 MG: 4 TABLET, ORALLY DISINTEGRATING ORAL at 23:25

## 2023-05-30 RX ADMIN — KETOROLAC TROMETHAMINE 15 MG: 30 INJECTION, SOLUTION INTRAMUSCULAR; INTRAVENOUS at 12:26

## 2023-05-30 RX ADMIN — CETIRIZINE HYDROCHLORIDE 10 MG: 10 TABLET, FILM COATED ORAL at 08:27

## 2023-05-30 ASSESSMENT — PAIN DESCRIPTION - LOCATION
LOCATION: BACK
LOCATION: BACK

## 2023-05-30 ASSESSMENT — PAIN SCALES - GENERAL
PAINLEVEL_OUTOF10: 6
PAINLEVEL_OUTOF10: 3
PAINLEVEL_OUTOF10: 3
PAINLEVEL_OUTOF10: 8
PAINLEVEL_OUTOF10: 5

## 2023-05-30 NOTE — PROGRESS NOTES
Hospitalist Progress Note   Admit Date:  2023 12:57 PM   Name:  Stanley Cordon   Age:  81 y.o. Sex:  female  :  1940   MRN:  [id number]   Room:  [facility]    Presenting/Chief Complaint: Shortness of Breath     Reason(s) for Admission: Adenocarcinoma of lung, stage 4, unspecified laterality ([facility] [address] [C34.90]  Malignant neoplasm of lung, unspecified laterality, unspecified part of lung ([address] [C34.90]  Dyspnea, unspecified type [R06.00]     Hospital Course:   Stanley Cordon is a 81 y.o. female with medical history of metastatic NSCLC s/p chemotherapy, immunotherapy and radiation therapy, RUE lymphedema as sequelae of XRT who presented to the ER on  with c/o increasing SOB and fatigue. The patient is followed by heme/onc and palliative care as an outpatient. She poorly tolerated therapies and stopped all treatment of her lung Ca earlier this year. She has been suffering from SOB and fatigue, which have been getting progressively worse. She underwent thoracentesis on , resulting in 600 cc fluid being removed from her R lung. Imaging in the ER today does not demonstrate large effusions. Pt does note that she has had increased swelling in her ankles. She has been unable to do her ADLs. She notes that she came to the hospital seeking help in being placed on home hospice. Pt was noted to have an O2 sat of 88% on RA in the ER, and was also tachycardic. She will be admitted for further care. Subjective & 24hr Events (23): Pt seen and examined. States she feels about the same. States she did not have much increase in UOP despite IV Lasix. Is eager to meet with hospice and return home. Using O2 for comfort. States pain is controlled with Norco, but it also makes her feel hungover. Requesting NSAIDs. Assessment & Plan:     Principal Problem:    Adenocarcinoma of lung, stage 4, unspecified laterality ([facility] [address] [address]    Respiratory failure  Plan: Pt received IV Lasix x 1 in the ER.  Will

## 2023-05-30 NOTE — ACP (ADVANCE CARE PLANNING)
Advance Care Planning   Healthcare Decision Maker:    Primary Decision Maker: Tere Nieves - [phone]    Click here to complete Healthcare Decision Makers including selection of the Healthcare Decision Maker Relationship (ie \"Primary\"). Today we documented Decision Maker(s) consistent with Legal Next of Kin hierarchy.

## 2023-05-30 NOTE — PROGRESS NOTES
Spiritual Consult. PT elected Hospice Care. PT was in bed awake. [address] introduced self. PT expressed importance of and comfort in [employer]. PT expressed appreciation for , [facility], and gratitude at receiving communion. [address] checked for unmet needs.  offered support. Rev. Destiny Pagan M.Div.

## 2023-05-30 NOTE — PLAN OF CARE
Problem: Discharge Planning  Goal: Discharge to home or other facility with appropriate resources  Outcome: Progressing  Flowsheets (Taken 5/29/2023 1943 by Odessa Thomas RN)  Discharge to home or other facility with appropriate resources: Refer to discharge planning if patient needs post-hospital services based on physician order or complex needs related to functional status, cognitive ability or social support system     Problem: Pain  Goal: Verbalizes/displays adequate comfort level or baseline comfort level  Outcome: Progressing     Problem: Safety - Adult  Goal: Free from fall injury  Outcome: Progressing

## 2023-05-30 NOTE — CARE COORDINATION
05/30/23 0941   Service Assessment   Patient Orientation Alert and Oriented   Cognition Alert   History Provided By Patient   Primary [address]   Patient's Healthcare Decision Maker is: Legal Next of Kin   PCP Verified by CM Yes   Last Visit to PCP Within last 3 months   Prior Functional Level Independent in ADLs/IADLs   Current Functional Level Independent in ADLs/IADLs   Can patient return to prior living arrangement Yes   Ability to make needs known: Good   Family able to assist with home care needs: Yes   Would you like for me to discuss the discharge plan with any other family members/significant others, and if so, who? No   Financial Resources [employer] None   Social/Functional History   Lives With Alone   Condition of Participation: Discharge Planning   The Plan for Transition of Care is related to the following treatment goals: home with hospice   The Patient and/or Patient Representative was provided with a Choice of Provider? Patient   The Patient and/Or Patient Representative agree with the Discharge Plan? Yes   Freedom of Choice list was provided with basic dialogue that supports the patient's individualized plan of care/goals, treatment preferences, and shares the quality data associated with the providers? Yes     Assessment completed with patient at bedside. Patient alert and oriented. Patient lives alone and reports she has good family support. Patient is wanting home hospice. Referral sent to [facility], per patient request. CM received confirmation referral was received from [facility] with [facility]. Referral is currently being reviewed.

## 2023-05-30 NOTE — PROGRESS NOTES
[facility]     Referral received and discussed with Yaw FERNANDEZ. Hospice Liaison will contact patient/family for hospice presentation and evaluation. Thank you for this referral.      Liaison met with patient at bedside to discuss hospice philosophy of care, hospice team members and frequency of visits. We also discussed the [facility] for general inpatient care with symptom management and respite care. Answered all questions. Thank you for the referral to [address]. Patient is alert, oriented and able to make her own decisions. Received approval for routine home care with [facility]. Liaison ordered equipment for delivery today. Transport set for 9 AM on 5/31/23. Terry Spann RN aware. Rosita Vazquez, MARTIN  Hospice Nurse Liaison  [phone]: C  [phone]:  O

## 2023-05-31 ENCOUNTER — HOME CARE VISIT (OUTPATIENT)
Dept: SCHEDULING | Facility: HOME HEALTH | Age: 83
End: 2023-05-31
Payer: MEDICARE

## 2023-05-31 VITALS
DIASTOLIC BLOOD PRESSURE: 70 MMHG | BODY MASS INDEX: 24.59 KG/M2 | HEIGHT: 64 IN | SYSTOLIC BLOOD PRESSURE: 120 MMHG | TEMPERATURE: 98.5 F | RESPIRATION RATE: 20 BRPM | OXYGEN SATURATION: 92 % | WEIGHT: 144 LBS | HEART RATE: 64 BPM

## 2023-05-31 VITALS
SYSTOLIC BLOOD PRESSURE: 115 MMHG | RESPIRATION RATE: 12 BRPM | HEART RATE: 86 BPM | BODY MASS INDEX: 24.59 KG/M2 | OXYGEN SATURATION: 91 % | DIASTOLIC BLOOD PRESSURE: 67 MMHG | HEIGHT: 64 IN | WEIGHT: 144 LBS | TEMPERATURE: 98.1 F

## 2023-05-31 PROCEDURE — G0299 HHS/HOSPICE OF RN EA 15 MIN: HCPCS

## 2023-05-31 PROCEDURE — G0378 HOSPITAL OBSERVATION PER HR: HCPCS

## 2023-05-31 PROCEDURE — 0651 HSPC ROUTINE HOME CARE

## 2023-05-31 PROCEDURE — 6370000000 HC RX 637 (ALT 250 FOR IP): Performed by: PHYSICIAN ASSISTANT

## 2023-05-31 RX ORDER — OXYCODONE HYDROCHLORIDE 5 MG/1
5 TABLET ORAL EVERY 6 HOURS PRN
Qty: 12 TABLET | Refills: 0 | Status: SHIPPED | OUTPATIENT
Start: 2023-05-31 | End: 2023-05-31

## 2023-05-31 RX ORDER — OXYCODONE HYDROCHLORIDE 5 MG/1
5 TABLET ORAL EVERY 6 HOURS PRN
Qty: 12 TABLET | Refills: 0 | Status: SHIPPED | OUTPATIENT
Start: 2023-05-31 | End: 2023-05-31 | Stop reason: SDUPTHER

## 2023-05-31 RX ORDER — ONDANSETRON 4 MG/1
4 TABLET, ORALLY DISINTEGRATING ORAL EVERY 8 HOURS PRN
Qty: 30 TABLET | Refills: 1 | Status: SHIPPED | OUTPATIENT
Start: 2023-05-31 | End: 2023-05-31 | Stop reason: SDUPTHER

## 2023-05-31 RX ORDER — HYDROCODONE BITARTRATE AND ACETAMINOPHEN 10; 325 MG/1; MG/1
1 TABLET ORAL EVERY 4 HOURS PRN
Qty: 18 TABLET | Refills: 0 | Status: SHIPPED | OUTPATIENT
Start: 2023-05-31 | End: 2023-05-31 | Stop reason: SDUPTHER

## 2023-05-31 RX ORDER — NALOXONE HYDROCHLORIDE 4 MG/.1ML
1 SPRAY NASAL PRN
Qty: 1 EACH | Refills: 0 | Status: SHIPPED | OUTPATIENT
Start: 2023-05-31 | End: 2023-05-31

## 2023-05-31 RX ORDER — HYDROCODONE BITARTRATE AND ACETAMINOPHEN 10; 325 MG/1; MG/1
1 TABLET ORAL EVERY 4 HOURS PRN
Status: DISCONTINUED | OUTPATIENT
Start: 2023-05-31 | End: 2023-05-31 | Stop reason: HOSPADM

## 2023-05-31 RX ORDER — OXYCODONE HYDROCHLORIDE 5 MG/1
5 TABLET ORAL ONCE
Status: COMPLETED | OUTPATIENT
Start: 2023-05-31 | End: 2023-05-31

## 2023-05-31 RX ORDER — HYDROCODONE BITARTRATE AND ACETAMINOPHEN 10; 325 MG/1; MG/1
1 TABLET ORAL EVERY 4 HOURS PRN
Qty: 18 TABLET | Refills: 0 | Status: SHIPPED | OUTPATIENT
Start: 2023-05-31 | End: 2023-05-31

## 2023-05-31 RX ORDER — ONDANSETRON 4 MG/1
4 TABLET, ORALLY DISINTEGRATING ORAL EVERY 8 HOURS PRN
Qty: 30 TABLET | Refills: 1 | Status: SHIPPED | OUTPATIENT
Start: 2023-05-31 | End: 2023-05-31

## 2023-05-31 RX ADMIN — HYDROCODONE BITARTRATE AND ACETAMINOPHEN 1 TABLET: 5; 325 TABLET ORAL at 06:32

## 2023-05-31 RX ADMIN — OXYCODONE HYDROCHLORIDE 5 MG: 5 TABLET ORAL at 08:12

## 2023-05-31 RX ADMIN — FUROSEMIDE 20 MG: 20 TABLET ORAL at 08:14

## 2023-05-31 RX ADMIN — ONDANSETRON 4 MG: 4 TABLET, ORALLY DISINTEGRATING ORAL at 08:17

## 2023-05-31 RX ADMIN — CETIRIZINE HYDROCHLORIDE 10 MG: 10 TABLET, FILM COATED ORAL at 08:14

## 2023-05-31 RX ADMIN — TIMOLOL MALEATE 2 DROP: 5 SOLUTION OPHTHALMIC at 08:18

## 2023-05-31 RX ADMIN — PANTOPRAZOLE SODIUM 40 MG: 40 TABLET, DELAYED RELEASE ORAL at 06:32

## 2023-05-31 ASSESSMENT — PAIN DESCRIPTION - LOCATION
LOCATION: BACK
LOCATION: BACK

## 2023-05-31 ASSESSMENT — ENCOUNTER SYMPTOMS
CONSTIPATION: 1
PAIN LOCATION - PAIN QUALITY: BURNING
NAUSEA: 1
DYSPNEA ACTIVITY LEVEL: AT REST
STOOL DESCRIPTION: FORMED

## 2023-05-31 ASSESSMENT — PAIN SCALES - GENERAL: PAINLEVEL_OUTOF10: 9
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
## 2023-05-31 NOTE — DISCHARGE SUMMARY
LABALBU 2.8*   GLOB 3.3      Cardiac  Lab Results   Component Value Date/Time    NTPROBNP 815 05/29/2023 01:26 PM    TROPHS 9.9 10/26/2020 12:08 PM      Coags No results found for: PROTIME, INR, APTT   A1c No results found for: LABA1C, EAG   Lipids Lab Results   Component Value Date/Time    CHOL 191 08/09/2022 02:51 PM    LDLCALC 115.8 08/09/2022 02:51 PM    LABVLDL 16.2 08/09/2022 02:51 PM    HDL 59 08/09/2022 02:51 PM    CHOLHDLRATIO 3.2 08/09/2022 02:51 PM    TRIG 81 08/09/2022 02:51 PM      Thyroid  Lab Results   Component Value Date/Time    DAK3MTY 1.020 11/03/2022 02:07 PM        Most Recent UA Lab Results   Component Value Date/Time    COLORU YELLOW/STRAW 08/16/2022 05:04 PM    APPEARANCE CLEAR 08/16/2022 05:04 PM    SPECGRAV 1.008 08/16/2022 05:04 PM    LABPH 6.0 08/16/2022 05:04 PM    PROTEINU Negative 08/16/2022 05:04 PM    GLUCOSEU Negative 08/16/2022 05:04 PM    KETUA Negative 08/16/2022 05:04 PM    BILIRUBINUR Negative 08/16/2022 05:04 PM    BILIRUBINUR Negative 02/15/2022 12:09 PM    BLOODU Negative 08/16/2022 05:04 PM    UROBILINOGEN 0.2 08/16/2022 05:04 PM    NITRU Negative 08/16/2022 05:04 PM    LEUKOCYTESUR Negative 08/16/2022 05:04 PM    WBCUA >30 02/15/2022 12:09 PM    RBCUA 0-2 02/15/2022 12:09 PM    BACTERIA Many 02/15/2022 12:09 PM    MUCUS 0 01/21/2021 02:44 PM        No results for input(s): CULTURE in the last 720 hours. All Labs from Last 24 Hrs:  No results found for this or any previous visit (from the past 24 hour(s)).     Allergies   Allergen Reactions    Levonorgestrel-Ethinyl Estrad Other (See Comments)     Patient denies ever taking medication    Ciprofibrate Diarrhea    Ciprofloxacin Diarrhea, Other (See Comments) and Nausea And Vomiting    Penicillins Rash     Immunization History   Administered Date(s) Administered    TDaP, ADACEL (age 10y-63y), BOOSTRIX (age 10y+), IM, 0.5mL 06/27/2018       Recent Vital Data:  Patient Vitals for the past 24 hrs:   Temp Pulse Resp BP SpO2

## 2023-06-01 ENCOUNTER — HOME CARE VISIT (OUTPATIENT)
Dept: HOSPICE | Facility: HOSPICE | Age: 83
End: 2023-06-01
Payer: MEDICARE

## 2023-06-01 ENCOUNTER — HOME CARE VISIT (OUTPATIENT)
Dept: SCHEDULING | Facility: HOME HEALTH | Age: 83
End: 2023-06-01
Payer: MEDICARE

## 2023-06-01 ENCOUNTER — PATIENT MESSAGE (OUTPATIENT)
Dept: PALLATIVE CARE | Age: 83
End: 2023-06-01

## 2023-06-01 ENCOUNTER — CARE COORDINATION (OUTPATIENT)
Dept: CARE COORDINATION | Facility: CLINIC | Age: 83
End: 2023-06-01

## 2023-06-01 PROCEDURE — G0155 HHCP-SVS OF CSW,EA 15 MIN: HCPCS

## 2023-06-01 PROCEDURE — 0651 HSPC ROUTINE HOME CARE

## 2023-06-01 PROCEDURE — G0299 HHS/HOSPICE OF RN EA 15 MIN: HCPCS

## 2023-06-02 VITALS — RESPIRATION RATE: 24 BRPM | DIASTOLIC BLOOD PRESSURE: 70 MMHG | HEART RATE: 88 BPM | SYSTOLIC BLOOD PRESSURE: 120 MMHG

## 2023-06-02 PROCEDURE — 0651 HSPC ROUTINE HOME CARE

## 2023-06-02 PROCEDURE — 2500000001 HSPC NON INJECTABLE MED

## 2023-06-02 ASSESSMENT — ENCOUNTER SYMPTOMS
DYSPNEA ACTIVITY LEVEL: AT REST
CONSTIPATION: 1
PAIN LOCATION - PAIN QUALITY: SHARP
COUGH: 1
SPUTUM AMOUNT: SCANT
COUGH CHARACTERISTICS: PRODUCTIVE
NAUSEA: 1
SPUTUM PRODUCTION: 1
HEMOPTYSIS: 0
SPUTUM COLOR: PINK
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
## 2023-06-02 NOTE — HOSPICE
S: pt Sylvia \"Thu\" in dbl bed in bedroom; pd carlton Gomez present; son in law Nikita arrived as  was leaving  B: initial visit/assessment  A: pt greeted  cheerfully, coughed a few times and was SOB upon exertion or talking; retired RN;   of dementia in  under [facility];  49 yrs. ; pt says she will contact [facility] re eucharistic ministers and sacraments; attested to a vital relationship of prayer and jake in [employer]; adult dtr and son in area; accepted offer of scripture and prayer  R: assurance of care/availability; active listening; Psa 23; prayer  Visit: 1 x / mo.; 2 prn

## 2023-06-02 NOTE — TELEPHONE ENCOUNTER
Request Reference Number: [id number]. LIDOCAINE OIN 5% is approved through 08/19/2023. Your patient may now fill this prescription and it will be covered.

## 2023-06-02 NOTE — HOSPICE
Pt is 81 y. o.female with primary dx of Metastatic adenocarcinoma. Pt lives in her home with the assistance of [facility]. Pt in bed wearing oxygen. Pt states she has shortness of breath when walking. Pt states she is eating fair and drinking without any difficulties. Pt states he adult children are very involved with her care and assist with any needs. Pt states she wants to maintain in her home as long as she can. Spoke of end of life care and pt states she is ready. Pt states she will utilize [facility] to assist with her arrnagements. No immediate need sor ocnerns at this time. Advised of all hospice services and community resources. LMSW will make referral to [employer] to assist with nutritional drinks. Ninoska Stephen is well managed at this time. Reviewed emergency careplan and encouraged to contact hospice as needed. All voiced understanding.

## 2023-06-03 PROCEDURE — 0651 HSPC ROUTINE HOME CARE

## 2023-06-04 PROCEDURE — 0651 HSPC ROUTINE HOME CARE

## 2023-06-06 ENCOUNTER — HOME CARE VISIT (OUTPATIENT)
Dept: HOSPICE | Facility: HOSPICE | Age: 83
End: 2023-06-06
Payer: MEDICARE

## 2023-06-06 PROCEDURE — G0155 HHCP-SVS OF CSW,EA 15 MIN: HCPCS

## 2023-06-07 ENCOUNTER — HOME CARE VISIT (OUTPATIENT)
Dept: SCHEDULING | Facility: HOME HEALTH | Age: 83
End: 2023-06-07
Payer: MEDICARE

## 2023-06-07 VITALS
SYSTOLIC BLOOD PRESSURE: 94 MMHG | TEMPERATURE: 97.6 F | RESPIRATION RATE: 18 BRPM | HEART RATE: 71 BPM | DIASTOLIC BLOOD PRESSURE: 68 MMHG

## 2023-06-07 PROCEDURE — G0299 HHS/HOSPICE OF RN EA 15 MIN: HCPCS

## 2023-06-07 ASSESSMENT — ENCOUNTER SYMPTOMS
CONSTIPATION: 1
DYSPNEA ACTIVITY LEVEL: AT REST
COUGH: 1
COUGH CHARACTERISTICS: PRODUCTIVE

## 2023-06-07 NOTE — HOSPICE
Routine home visit conducted today. Present were pt, pt's son, private duty caregiver, and RN. Pt resting in her own bed upon arrival. Alert and oriented x 4. Reports pain 10/10 in back, lungs, and arms. RN discussed uncontorlled pain with Rk Masterson. Fentanyl 25 mcg patch ordered, oxycodone increased to 10 mg q 2 hours prn and faxed in to local [facility] in [place]. RN confirmed that fax was received and medication was in stock. Pt also reports SOB at rest and frequent cough with pale yellow sputum production. RN encouraged use of roxanol to ease resp symptoms. Pt has not eaten in 24 hours but is sipping on water. Pt stated she would like to discontinue lasix and gabapentin. No edema observed. Provider made aware. Several supplies ordered via [id number]. Several medication refilled via [employer]. RN reminded pt and family of 24/7 RN availability and encouraged them to call [facility] at any time with questions or concerns. 70.3

## 2023-06-08 ASSESSMENT — ENCOUNTER SYMPTOMS: HEMOPTYSIS: 0

## 2023-06-08 NOTE — HOSPICE
Routine visit made to assess needs and offer emotional support. Met with caregiver to discuss any needs or concerns. Pt in bed asleep and slept throughout visit. Caregiver reports pt is short of breath when talking and moving around. Pt complains of discomfort. Caregiver reports pt is not eating not drinking very much at this time. Delivered nutritional drinks from [employer] as requested. Overall pt is well cared for at this time. Reviewed emergency careplan and ecnouraged to contact hospice as needed. All voiced understanding.

## 2023-06-09 ENCOUNTER — HOME CARE VISIT (OUTPATIENT)
Dept: SCHEDULING | Facility: HOME HEALTH | Age: 83
End: 2023-06-09
Payer: MEDICARE

## 2023-06-09 PROCEDURE — G0299 HHS/HOSPICE OF RN EA 15 MIN: HCPCS

## 2023-06-09 NOTE — HOSPICE
RN made prn visit to home at pt's request. Pt has become increasingly weak and requested urinary catheter be placed. RN performed hand hygiene. Inserted martin catheter #[id number], inflated martin catheter balloon with 10cc sterile water. Pt tolerated procedure well. Catheter placed with one insertion attempt. SN educated family and paid cg on proper routine care of urinary catheter and how to observe for s/sx of catheter acquired urinary tract infection- cloudy and foul-smelling urine, flank pain, altered mental status, fever. SN will educated family and pt on the need to notify nurse if any s/s of infection are observed. Pt and family reminded of 24/7 RN availability and encouraged them to call [address] at any time with questions or concerns. Morphine refilled via [employer]. No DME/supply needs expressed.

## 2023-07-14 RX ORDER — FUROSEMIDE 20 MG/1
TABLET ORAL
Qty: 60 TABLET | Refills: 0 | OUTPATIENT
Start: 2023-07-14